# Patient Record
Sex: FEMALE | Race: WHITE | Employment: OTHER | URBAN - METROPOLITAN AREA
[De-identification: names, ages, dates, MRNs, and addresses within clinical notes are randomized per-mention and may not be internally consistent; named-entity substitution may affect disease eponyms.]

---

## 2018-06-17 ENCOUNTER — HOSPITAL ENCOUNTER (INPATIENT)
Age: 83
LOS: 8 days | Discharge: NURSING, REHAB OR LONG-TERM CARE | DRG: 242 | End: 2018-06-25
Attending: FAMILY MEDICINE | Admitting: INTERNAL MEDICINE
Payer: MEDICARE

## 2018-06-17 ENCOUNTER — APPOINTMENT (OUTPATIENT)
Dept: CT IMAGING | Age: 83
DRG: 242 | End: 2018-06-17
Payer: MEDICARE

## 2018-06-17 ENCOUNTER — APPOINTMENT (OUTPATIENT)
Dept: GENERAL RADIOLOGY | Age: 83
DRG: 242 | End: 2018-06-17
Payer: MEDICARE

## 2018-06-17 DIAGNOSIS — I63.50 CEREBROVASCULAR ACCIDENT (CVA) DUE TO STENOSIS OF CEREBRAL ARTERY (HCC): ICD-10-CM

## 2018-06-17 DIAGNOSIS — I21.4 NON-STEMI (NON-ST ELEVATED MYOCARDIAL INFARCTION) (HCC): ICD-10-CM

## 2018-06-17 DIAGNOSIS — I16.0 HYPERTENSIVE URGENCY: Primary | ICD-10-CM

## 2018-06-17 DIAGNOSIS — J18.9 PNEUMONIA OF BOTH LUNGS DUE TO INFECTIOUS ORGANISM, UNSPECIFIED PART OF LUNG: ICD-10-CM

## 2018-06-17 DIAGNOSIS — I45.9 HEART BLOCK: ICD-10-CM

## 2018-06-17 DIAGNOSIS — R00.1 BRADYCARDIA WITH 31-40 BEATS PER MINUTE: ICD-10-CM

## 2018-06-17 PROBLEM — R29.90 STROKE-LIKE SYMPTOM: Status: ACTIVE | Noted: 2018-06-17

## 2018-06-17 LAB
ALBUMIN SERPL-MCNC: 2.9 G/DL (ref 3.9–4.9)
ALP BLD-CCNC: 83 U/L (ref 40–130)
ALT SERPL-CCNC: 8 U/L (ref 0–33)
AMYLASE: 58 U/L (ref 28–100)
ANION GAP SERPL CALCULATED.3IONS-SCNC: 19 MEQ/L (ref 7–13)
AST SERPL-CCNC: 21 U/L (ref 0–35)
BACTERIA: NORMAL /HPF
BASOPHILS ABSOLUTE: 0.1 K/UL (ref 0–0.2)
BASOPHILS RELATIVE PERCENT: 0.7 %
BILIRUB SERPL-MCNC: 0.5 MG/DL (ref 0–1.2)
BILIRUBIN URINE: NEGATIVE
BLOOD, URINE: ABNORMAL
BUN BLDV-MCNC: 15 MG/DL (ref 8–23)
CALCIUM SERPL-MCNC: 8.6 MG/DL (ref 8.6–10.2)
CHLORIDE BLD-SCNC: 97 MEQ/L (ref 98–107)
CK MB: <0.1 NG/ML (ref 0–3.8)
CLARITY: CLEAR
CO2: 21 MEQ/L (ref 22–29)
COLOR: YELLOW
CREAT SERPL-MCNC: 0.51 MG/DL (ref 0.5–0.9)
CREATINE KINASE-MB INDEX: 0.3 % (ref 0–3.5)
EOSINOPHILS ABSOLUTE: 0 K/UL (ref 0–0.7)
EOSINOPHILS RELATIVE PERCENT: 0.6 %
GFR AFRICAN AMERICAN: >60
GFR NON-AFRICAN AMERICAN: >60
GLOBULIN: 4.8 G/DL (ref 2.3–3.5)
GLUCOSE BLD-MCNC: 185 MG/DL (ref 74–109)
GLUCOSE BLD-MCNC: 212 MG/DL
GLUCOSE BLD-MCNC: 212 MG/DL (ref 60–115)
GLUCOSE URINE: NEGATIVE MG/DL
HCT VFR BLD CALC: 32.1 % (ref 37–47)
HEMOGLOBIN: 10.3 G/DL (ref 12–16)
KETONES, URINE: ABNORMAL MG/DL
LACTIC ACID: 4.2 MMOL/L (ref 0.5–2.2)
LEUKOCYTE ESTERASE, URINE: NEGATIVE
LIPASE: 26 U/L (ref 13–60)
LYMPHOCYTES ABSOLUTE: 1.7 K/UL (ref 1–4.8)
LYMPHOCYTES RELATIVE PERCENT: 22.4 %
MCH RBC QN AUTO: 27.5 PG (ref 27–31.3)
MCHC RBC AUTO-ENTMCNC: 32.1 % (ref 33–37)
MCV RBC AUTO: 85.8 FL (ref 82–100)
MONOCYTES ABSOLUTE: 0.5 K/UL (ref 0.2–0.8)
MONOCYTES RELATIVE PERCENT: 6.4 %
NEUTROPHILS ABSOLUTE: 5.3 K/UL (ref 1.4–6.5)
NEUTROPHILS RELATIVE PERCENT: 69.9 %
NITRITE, URINE: NEGATIVE
PDW BLD-RTO: 16.4 % (ref 11.5–14.5)
PERFORMED ON: ABNORMAL
PERFORMED ON: ABNORMAL
PH UA: 7 (ref 5–9)
PLATELET # BLD: 394 K/UL (ref 130–400)
POC ACTIVATED CLOTTING TIME KAOLIN: 158 SEC (ref 82–152)
POC SAMPLE TYPE: ABNORMAL
POTASSIUM REFLEX MAGNESIUM: 3.7 MEQ/L (ref 3.5–5.1)
PRO-BNP: 4752 PG/ML
PROTEIN UA: >=300 MG/DL
RBC # BLD: 3.75 M/UL (ref 4.2–5.4)
RBC UA: NORMAL /HPF (ref 0–2)
SODIUM BLD-SCNC: 137 MEQ/L (ref 132–144)
SPECIFIC GRAVITY UA: 1.01 (ref 1–1.03)
TOTAL CK: 33 U/L (ref 0–170)
TOTAL PROTEIN: 7.7 G/DL (ref 6.4–8.1)
TROPONIN: 0.03 NG/ML (ref 0–0.01)
TROPONIN: 0.26 NG/ML (ref 0–0.01)
TROPONIN: 0.3 NG/ML (ref 0–0.01)
TSH SERPL DL<=0.05 MIU/L-ACNC: 10.45 UIU/ML (ref 0.27–4.2)
UROBILINOGEN, URINE: 0.2 E.U./DL
WBC # BLD: 7.5 K/UL (ref 4.8–10.8)
WBC UA: NORMAL /HPF (ref 0–5)

## 2018-06-17 PROCEDURE — 6360000002 HC RX W HCPCS: Performed by: FAMILY MEDICINE

## 2018-06-17 PROCEDURE — 6370000000 HC RX 637 (ALT 250 FOR IP): Performed by: FAMILY MEDICINE

## 2018-06-17 PROCEDURE — 84484 ASSAY OF TROPONIN QUANT: CPT

## 2018-06-17 PROCEDURE — 6370000000 HC RX 637 (ALT 250 FOR IP): Performed by: INTERNAL MEDICINE

## 2018-06-17 PROCEDURE — 83690 ASSAY OF LIPASE: CPT

## 2018-06-17 PROCEDURE — 82553 CREATINE MB FRACTION: CPT

## 2018-06-17 PROCEDURE — 83605 ASSAY OF LACTIC ACID: CPT

## 2018-06-17 PROCEDURE — C1874 STENT, COATED/COV W/DEL SYS: HCPCS

## 2018-06-17 PROCEDURE — 6360000002 HC RX W HCPCS: Performed by: INTERNAL MEDICINE

## 2018-06-17 PROCEDURE — C1725 CATH, TRANSLUMIN NON-LASER: HCPCS

## 2018-06-17 PROCEDURE — 71045 X-RAY EXAM CHEST 1 VIEW: CPT

## 2018-06-17 PROCEDURE — 71250 CT THORAX DX C-: CPT

## 2018-06-17 PROCEDURE — 87086 URINE CULTURE/COLONY COUNT: CPT

## 2018-06-17 PROCEDURE — 2580000003 HC RX 258: Performed by: INTERNAL MEDICINE

## 2018-06-17 PROCEDURE — 2000000000 HC ICU R&B

## 2018-06-17 PROCEDURE — C1894 INTRO/SHEATH, NON-LASER: HCPCS

## 2018-06-17 PROCEDURE — 5A1213Z PERFORMANCE OF CARDIAC PACING, INTERMITTENT: ICD-10-PCS | Performed by: INTERNAL MEDICINE

## 2018-06-17 PROCEDURE — 4A023N7 MEASUREMENT OF CARDIAC SAMPLING AND PRESSURE, LEFT HEART, PERCUTANEOUS APPROACH: ICD-10-PCS | Performed by: INTERNAL MEDICINE

## 2018-06-17 PROCEDURE — 6370000000 HC RX 637 (ALT 250 FOR IP)

## 2018-06-17 PROCEDURE — 81001 URINALYSIS AUTO W/SCOPE: CPT

## 2018-06-17 PROCEDURE — 99285 EMERGENCY DEPT VISIT HI MDM: CPT

## 2018-06-17 PROCEDURE — C1887 CATHETER, GUIDING: HCPCS

## 2018-06-17 PROCEDURE — C9600 PERC DRUG-EL COR STENT SING: HCPCS | Performed by: INTERNAL MEDICINE

## 2018-06-17 PROCEDURE — 70450 CT HEAD/BRAIN W/O DYE: CPT

## 2018-06-17 PROCEDURE — 85025 COMPLETE CBC W/AUTO DIFF WBC: CPT

## 2018-06-17 PROCEDURE — B2151ZZ FLUOROSCOPY OF LEFT HEART USING LOW OSMOLAR CONTRAST: ICD-10-PCS | Performed by: INTERNAL MEDICINE

## 2018-06-17 PROCEDURE — 2500000003 HC RX 250 WO HCPCS

## 2018-06-17 PROCEDURE — 85347 COAGULATION TIME ACTIVATED: CPT

## 2018-06-17 PROCEDURE — C1898 LEAD, PMKR, OTHER THAN TRANS: HCPCS

## 2018-06-17 PROCEDURE — 82150 ASSAY OF AMYLASE: CPT

## 2018-06-17 PROCEDURE — 93458 L HRT ARTERY/VENTRICLE ANGIO: CPT | Performed by: INTERNAL MEDICINE

## 2018-06-17 PROCEDURE — 6360000002 HC RX W HCPCS

## 2018-06-17 PROCEDURE — B2111ZZ FLUOROSCOPY OF MULTIPLE CORONARY ARTERIES USING LOW OSMOLAR CONTRAST: ICD-10-PCS | Performed by: INTERNAL MEDICINE

## 2018-06-17 PROCEDURE — 93005 ELECTROCARDIOGRAM TRACING: CPT

## 2018-06-17 PROCEDURE — 36415 COLL VENOUS BLD VENIPUNCTURE: CPT

## 2018-06-17 PROCEDURE — 027034Z DILATION OF CORONARY ARTERY, ONE ARTERY WITH DRUG-ELUTING INTRALUMINAL DEVICE, PERCUTANEOUS APPROACH: ICD-10-PCS | Performed by: INTERNAL MEDICINE

## 2018-06-17 PROCEDURE — 83880 ASSAY OF NATRIURETIC PEPTIDE: CPT

## 2018-06-17 PROCEDURE — 82550 ASSAY OF CK (CPK): CPT

## 2018-06-17 PROCEDURE — 84443 ASSAY THYROID STIM HORMONE: CPT

## 2018-06-17 PROCEDURE — 2580000003 HC RX 258: Performed by: FAMILY MEDICINE

## 2018-06-17 PROCEDURE — 80053 COMPREHEN METABOLIC PANEL: CPT

## 2018-06-17 PROCEDURE — C1769 GUIDE WIRE: HCPCS

## 2018-06-17 PROCEDURE — 2580000003 HC RX 258

## 2018-06-17 RX ORDER — METOPROLOL TARTRATE 5 MG/5ML
2.5 INJECTION INTRAVENOUS EVERY 6 HOURS
Status: DISCONTINUED | OUTPATIENT
Start: 2018-06-17 | End: 2018-06-21

## 2018-06-17 RX ORDER — ONDANSETRON 2 MG/ML
4 INJECTION INTRAMUSCULAR; INTRAVENOUS EVERY 6 HOURS PRN
Status: DISCONTINUED | OUTPATIENT
Start: 2018-06-17 | End: 2018-06-25 | Stop reason: HOSPADM

## 2018-06-17 RX ORDER — DOPAMINE HYDROCHLORIDE 160 MG/100ML
4 INJECTION, SOLUTION INTRAVENOUS CONTINUOUS
Status: DISCONTINUED | OUTPATIENT
Start: 2018-06-17 | End: 2018-06-19

## 2018-06-17 RX ORDER — SODIUM CHLORIDE 0.9 % (FLUSH) 0.9 %
10 SYRINGE (ML) INJECTION EVERY 12 HOURS SCHEDULED
Status: DISCONTINUED | OUTPATIENT
Start: 2018-06-17 | End: 2018-06-25 | Stop reason: HOSPADM

## 2018-06-17 RX ORDER — SODIUM CHLORIDE 9 MG/ML
INJECTION, SOLUTION INTRAVENOUS CONTINUOUS
Status: ACTIVE | OUTPATIENT
Start: 2018-06-17 | End: 2018-06-18

## 2018-06-17 RX ORDER — ENALAPRILAT 2.5 MG/2ML
1.25 INJECTION INTRAVENOUS EVERY 6 HOURS
Status: CANCELLED | OUTPATIENT
Start: 2018-06-17

## 2018-06-17 RX ORDER — 0.9 % SODIUM CHLORIDE 0.9 %
1000 INTRAVENOUS SOLUTION INTRAVENOUS ONCE
Status: COMPLETED | OUTPATIENT
Start: 2018-06-17 | End: 2018-06-17

## 2018-06-17 RX ORDER — SODIUM CHLORIDE 9 MG/ML
INJECTION, SOLUTION INTRAVENOUS CONTINUOUS
Status: DISCONTINUED | OUTPATIENT
Start: 2018-06-17 | End: 2018-06-18 | Stop reason: SDUPTHER

## 2018-06-17 RX ORDER — ASPIRIN 81 MG/1
81 TABLET ORAL DAILY
Status: DISCONTINUED | OUTPATIENT
Start: 2018-06-18 | End: 2018-06-21

## 2018-06-17 RX ORDER — SODIUM CHLORIDE 0.9 % (FLUSH) 0.9 %
10 SYRINGE (ML) INJECTION PRN
Status: DISCONTINUED | OUTPATIENT
Start: 2018-06-17 | End: 2018-06-18 | Stop reason: SDUPTHER

## 2018-06-17 RX ORDER — SIMVASTATIN 20 MG
20 TABLET ORAL NIGHTLY
Status: DISCONTINUED | OUTPATIENT
Start: 2018-06-17 | End: 2018-06-25 | Stop reason: HOSPADM

## 2018-06-17 RX ORDER — HYDRALAZINE HYDROCHLORIDE 20 MG/ML
10 INJECTION INTRAMUSCULAR; INTRAVENOUS EVERY 4 HOURS PRN
Status: DISCONTINUED | OUTPATIENT
Start: 2018-06-17 | End: 2018-06-17 | Stop reason: SDUPTHER

## 2018-06-17 RX ORDER — ONDANSETRON 2 MG/ML
4 INJECTION INTRAMUSCULAR; INTRAVENOUS EVERY 6 HOURS PRN
Status: DISCONTINUED | OUTPATIENT
Start: 2018-06-17 | End: 2018-06-18 | Stop reason: SDUPTHER

## 2018-06-17 RX ORDER — ACETAMINOPHEN 325 MG/1
650 TABLET ORAL EVERY 4 HOURS PRN
Status: DISCONTINUED | OUTPATIENT
Start: 2018-06-17 | End: 2018-06-25 | Stop reason: HOSPADM

## 2018-06-17 RX ORDER — ATROPINE SULFATE 0.4 MG/ML
0.6 AMPUL (ML) INJECTION
Status: ACTIVE | OUTPATIENT
Start: 2018-06-17 | End: 2018-06-17

## 2018-06-17 RX ORDER — CLOPIDOGREL BISULFATE 75 MG/1
75 TABLET ORAL DAILY
Status: DISCONTINUED | OUTPATIENT
Start: 2018-06-18 | End: 2018-06-21

## 2018-06-17 RX ORDER — 0.9 % SODIUM CHLORIDE 0.9 %
500 INTRAVENOUS SOLUTION INTRAVENOUS ONCE
Status: DISCONTINUED | OUTPATIENT
Start: 2018-06-17 | End: 2018-06-25 | Stop reason: HOSPADM

## 2018-06-17 RX ORDER — HYDRALAZINE HYDROCHLORIDE 20 MG/ML
10 INJECTION INTRAMUSCULAR; INTRAVENOUS EVERY 6 HOURS
Status: DISCONTINUED | OUTPATIENT
Start: 2018-06-17 | End: 2018-06-18

## 2018-06-17 RX ORDER — HYDRALAZINE HYDROCHLORIDE 20 MG/ML
10 INJECTION INTRAMUSCULAR; INTRAVENOUS EVERY 4 HOURS PRN
Status: DISCONTINUED | OUTPATIENT
Start: 2018-06-17 | End: 2018-06-17

## 2018-06-17 RX ORDER — HYDRALAZINE HYDROCHLORIDE 20 MG/ML
10 INJECTION INTRAMUSCULAR; INTRAVENOUS EVERY 10 MIN PRN
Status: COMPLETED | OUTPATIENT
Start: 2018-06-17 | End: 2018-06-18

## 2018-06-17 RX ORDER — LOSARTAN POTASSIUM 50 MG/1
50 TABLET ORAL DAILY
Status: DISCONTINUED | OUTPATIENT
Start: 2018-06-17 | End: 2018-06-19

## 2018-06-17 RX ORDER — HYDRALAZINE HYDROCHLORIDE 20 MG/ML
10 INJECTION INTRAMUSCULAR; INTRAVENOUS ONCE
Status: COMPLETED | OUTPATIENT
Start: 2018-06-17 | End: 2018-06-17

## 2018-06-17 RX ORDER — DOCUSATE SODIUM 100 MG/1
100 CAPSULE, LIQUID FILLED ORAL 2 TIMES DAILY
Status: DISCONTINUED | OUTPATIENT
Start: 2018-06-17 | End: 2018-06-25 | Stop reason: HOSPADM

## 2018-06-17 RX ORDER — SODIUM CHLORIDE 0.9 % (FLUSH) 0.9 %
10 SYRINGE (ML) INJECTION EVERY 12 HOURS SCHEDULED
Status: DISCONTINUED | OUTPATIENT
Start: 2018-06-17 | End: 2018-06-18 | Stop reason: SDUPTHER

## 2018-06-17 RX ORDER — POTASSIUM CHLORIDE 20MEQ/15ML
20 LIQUID (ML) ORAL ONCE
Status: COMPLETED | OUTPATIENT
Start: 2018-06-17 | End: 2018-06-17

## 2018-06-17 RX ORDER — MAGNESIUM SULFATE IN WATER 40 MG/ML
4 INJECTION, SOLUTION INTRAVENOUS ONCE
Status: COMPLETED | OUTPATIENT
Start: 2018-06-17 | End: 2018-06-18

## 2018-06-17 RX ORDER — METOPROLOL TARTRATE 5 MG/5ML
INJECTION INTRAVENOUS
Status: COMPLETED
Start: 2018-06-17 | End: 2018-06-17

## 2018-06-17 RX ADMIN — METOROPROLOL TARTRATE 2.5 MG: 5 INJECTION, SOLUTION INTRAVENOUS at 21:54

## 2018-06-17 RX ADMIN — AZITHROMYCIN MONOHYDRATE 500 MG: 500 INJECTION, POWDER, LYOPHILIZED, FOR SOLUTION INTRAVENOUS at 18:22

## 2018-06-17 RX ADMIN — SODIUM CHLORIDE: 9 INJECTION, SOLUTION INTRAVENOUS at 20:45

## 2018-06-17 RX ADMIN — DOCUSATE SODIUM 100 MG: 100 CAPSULE, LIQUID FILLED ORAL at 21:30

## 2018-06-17 RX ADMIN — HYDRALAZINE HYDROCHLORIDE 10 MG: 20 INJECTION INTRAMUSCULAR; INTRAVENOUS at 15:45

## 2018-06-17 RX ADMIN — DOPAMINE HYDROCHLORIDE 4 MCG/KG/MIN: 160 INJECTION, SOLUTION INTRAVENOUS at 14:03

## 2018-06-17 RX ADMIN — Medication 10 ML: at 21:00

## 2018-06-17 RX ADMIN — HYDRALAZINE HYDROCHLORIDE 10 MG: 20 INJECTION INTRAMUSCULAR; INTRAVENOUS at 18:21

## 2018-06-17 RX ADMIN — SODIUM CHLORIDE 1000 ML: 9 INJECTION, SOLUTION INTRAVENOUS at 12:45

## 2018-06-17 RX ADMIN — CEFTRIAXONE SODIUM 1 G: 1 INJECTION, POWDER, FOR SOLUTION INTRAMUSCULAR; INTRAVENOUS at 18:22

## 2018-06-17 RX ADMIN — SIMVASTATIN 20 MG: 20 TABLET, FILM COATED ORAL at 21:30

## 2018-06-17 RX ADMIN — Medication 10 ML: at 21:31

## 2018-06-17 RX ADMIN — ASPIRIN 325 MG: 325 TABLET, DELAYED RELEASE ORAL at 13:31

## 2018-06-17 RX ADMIN — HYDRALAZINE HYDROCHLORIDE 10 MG: 20 INJECTION INTRAMUSCULAR; INTRAVENOUS at 13:32

## 2018-06-17 RX ADMIN — SODIUM CHLORIDE: 9 INJECTION, SOLUTION INTRAVENOUS at 18:22

## 2018-06-17 RX ADMIN — NITROGLYCERIN 1 INCH: 20 OINTMENT TOPICAL at 18:22

## 2018-06-17 RX ADMIN — MAGNESIUM SULFATE IN WATER 4 G: 40 INJECTION, SOLUTION INTRAVENOUS at 21:42

## 2018-06-17 RX ADMIN — SODIUM CHLORIDE 1000 ML: 9 INJECTION, SOLUTION INTRAVENOUS at 11:29

## 2018-06-17 RX ADMIN — LOSARTAN POTASSIUM 50 MG: 50 TABLET ORAL at 23:20

## 2018-06-17 RX ADMIN — HYDRALAZINE HYDROCHLORIDE 10 MG: 20 INJECTION INTRAMUSCULAR; INTRAVENOUS at 21:35

## 2018-06-17 RX ADMIN — POTASSIUM CHLORIDE 20 MEQ: 20 SOLUTION ORAL at 21:49

## 2018-06-17 ASSESSMENT — PAIN SCALES - GENERAL
PAINLEVEL_OUTOF10: 0

## 2018-06-17 ASSESSMENT — ENCOUNTER SYMPTOMS: RESPIRATORY NEGATIVE: 1

## 2018-06-18 LAB
ALBUMIN SERPL-MCNC: 2.4 G/DL (ref 3.9–4.9)
ALP BLD-CCNC: 67 U/L (ref 40–130)
ALT SERPL-CCNC: 7 U/L (ref 0–33)
ANION GAP SERPL CALCULATED.3IONS-SCNC: 17 MEQ/L (ref 7–13)
AST SERPL-CCNC: 21 U/L (ref 0–35)
BACTERIA: NORMAL /HPF
BASOPHILS ABSOLUTE: 0 K/UL (ref 0–0.2)
BASOPHILS RELATIVE PERCENT: 0.3 %
BILIRUB SERPL-MCNC: 0.4 MG/DL (ref 0–1.2)
BILIRUBIN URINE: NEGATIVE
BLOOD, URINE: NEGATIVE
BUN BLDV-MCNC: 18 MG/DL (ref 8–23)
CALCIUM SERPL-MCNC: 8.2 MG/DL (ref 8.6–10.2)
CHLORIDE BLD-SCNC: 104 MEQ/L (ref 98–107)
CLARITY: CLEAR
CO2: 19 MEQ/L (ref 22–29)
COLOR: YELLOW
CREAT SERPL-MCNC: 0.63 MG/DL (ref 0.5–0.9)
EOSINOPHILS ABSOLUTE: 0 K/UL (ref 0–0.7)
EOSINOPHILS RELATIVE PERCENT: 0 %
EPITHELIAL CELLS, UA: NORMAL /HPF
GFR AFRICAN AMERICAN: >60
GFR NON-AFRICAN AMERICAN: >60
GLOBULIN: 4.5 G/DL (ref 2.3–3.5)
GLUCOSE BLD-MCNC: 108 MG/DL (ref 74–109)
GLUCOSE URINE: NEGATIVE MG/DL
HCT VFR BLD CALC: 31.7 % (ref 37–47)
HEMOGLOBIN: 10.1 G/DL (ref 12–16)
KETONES, URINE: NEGATIVE MG/DL
LEUKOCYTE ESTERASE, URINE: ABNORMAL
LV EF: 60 %
LVEF MODALITY: NORMAL
LYMPHOCYTES ABSOLUTE: 0.6 K/UL (ref 1–4.8)
LYMPHOCYTES RELATIVE PERCENT: 4.7 %
MAGNESIUM: 3.7 MG/DL (ref 1.7–2.3)
MCH RBC QN AUTO: 27.5 PG (ref 27–31.3)
MCHC RBC AUTO-ENTMCNC: 31.8 % (ref 33–37)
MCV RBC AUTO: 86.7 FL (ref 82–100)
MONOCYTES ABSOLUTE: 0.6 K/UL (ref 0.2–0.8)
MONOCYTES RELATIVE PERCENT: 4.3 %
NEUTROPHILS ABSOLUTE: 11.6 K/UL (ref 1.4–6.5)
NEUTROPHILS RELATIVE PERCENT: 90.7 %
NITRITE, URINE: NEGATIVE
PDW BLD-RTO: 17.1 % (ref 11.5–14.5)
PH UA: 5 (ref 5–9)
PLATELET # BLD: 268 K/UL (ref 130–400)
POTASSIUM REFLEX MAGNESIUM: 3.8 MEQ/L (ref 3.5–5.1)
POTASSIUM SERPL-SCNC: 3.8 MEQ/L (ref 3.5–5.1)
PROTEIN UA: 30 MG/DL
RBC # BLD: 3.66 M/UL (ref 4.2–5.4)
RBC UA: NORMAL /HPF (ref 0–2)
SODIUM BLD-SCNC: 140 MEQ/L (ref 132–144)
SPECIFIC GRAVITY UA: 1.04 (ref 1–1.03)
TOTAL PROTEIN: 6.9 G/DL (ref 6.4–8.1)
TROPONIN: 0.23 NG/ML (ref 0–0.01)
TROPONIN: 0.3 NG/ML (ref 0–0.01)
URINE CULTURE, ROUTINE: NORMAL
UROBILINOGEN, URINE: 0.2 E.U./DL
WBC # BLD: 12.8 K/UL (ref 4.8–10.8)
WBC UA: NORMAL /HPF (ref 0–5)

## 2018-06-18 PROCEDURE — 80053 COMPREHEN METABOLIC PANEL: CPT

## 2018-06-18 PROCEDURE — 87040 BLOOD CULTURE FOR BACTERIA: CPT

## 2018-06-18 PROCEDURE — 2580000003 HC RX 258: Performed by: INTERNAL MEDICINE

## 2018-06-18 PROCEDURE — 6360000002 HC RX W HCPCS: Performed by: INTERNAL MEDICINE

## 2018-06-18 PROCEDURE — 85025 COMPLETE CBC W/AUTO DIFF WBC: CPT

## 2018-06-18 PROCEDURE — 6370000000 HC RX 637 (ALT 250 FOR IP): Performed by: INTERNAL MEDICINE

## 2018-06-18 PROCEDURE — 83735 ASSAY OF MAGNESIUM: CPT

## 2018-06-18 PROCEDURE — 2000000000 HC ICU R&B

## 2018-06-18 PROCEDURE — 99223 1ST HOSP IP/OBS HIGH 75: CPT | Performed by: INTERNAL MEDICINE

## 2018-06-18 PROCEDURE — 84484 ASSAY OF TROPONIN QUANT: CPT

## 2018-06-18 PROCEDURE — 92610 EVALUATE SWALLOWING FUNCTION: CPT

## 2018-06-18 PROCEDURE — 93005 ELECTROCARDIOGRAM TRACING: CPT

## 2018-06-18 PROCEDURE — 2700000000 HC OXYGEN THERAPY PER DAY

## 2018-06-18 PROCEDURE — G8996 SWALLOW CURRENT STATUS: HCPCS

## 2018-06-18 PROCEDURE — 81001 URINALYSIS AUTO W/SCOPE: CPT

## 2018-06-18 PROCEDURE — 2500000003 HC RX 250 WO HCPCS: Performed by: INTERNAL MEDICINE

## 2018-06-18 PROCEDURE — 87086 URINE CULTURE/COLONY COUNT: CPT

## 2018-06-18 PROCEDURE — 36415 COLL VENOUS BLD VENIPUNCTURE: CPT

## 2018-06-18 PROCEDURE — G8997 SWALLOW GOAL STATUS: HCPCS

## 2018-06-18 PROCEDURE — 93306 TTE W/DOPPLER COMPLETE: CPT

## 2018-06-18 RX ORDER — SODIUM CHLORIDE 9 MG/ML
INJECTION, SOLUTION INTRAVENOUS CONTINUOUS
Status: DISCONTINUED | OUTPATIENT
Start: 2018-06-18 | End: 2018-06-20

## 2018-06-18 RX ORDER — HYDRALAZINE HYDROCHLORIDE 20 MG/ML
10 INJECTION INTRAMUSCULAR; INTRAVENOUS EVERY 4 HOURS PRN
Status: DISCONTINUED | OUTPATIENT
Start: 2018-06-18 | End: 2018-06-25 | Stop reason: HOSPADM

## 2018-06-18 RX ORDER — CHLORHEXIDINE GLUCONATE 4 G/100ML
SOLUTION TOPICAL ONCE
Status: COMPLETED | OUTPATIENT
Start: 2018-06-19 | End: 2018-06-19

## 2018-06-18 RX ORDER — VANCOMYCIN HYDROCHLORIDE 500 MG/100ML
500 INJECTION, SOLUTION INTRAVENOUS ONCE
Status: DISCONTINUED | OUTPATIENT
Start: 2018-06-18 | End: 2018-06-18

## 2018-06-18 RX ORDER — CHLORHEXIDINE GLUCONATE 4 G/100ML
SOLUTION TOPICAL ONCE
Status: DISCONTINUED | OUTPATIENT
Start: 2018-06-18 | End: 2018-06-18

## 2018-06-18 RX ORDER — VANCOMYCIN HYDROCHLORIDE 1 G/200ML
1000 INJECTION, SOLUTION INTRAVENOUS ONCE
Status: DISCONTINUED | OUTPATIENT
Start: 2018-06-18 | End: 2018-06-18

## 2018-06-18 RX ORDER — VANCOMYCIN HYDROCHLORIDE 500 MG/100ML
500 INJECTION, SOLUTION INTRAVENOUS ONCE
Status: DISCONTINUED | OUTPATIENT
Start: 2018-06-19 | End: 2018-06-21

## 2018-06-18 RX ORDER — HYDRALAZINE HYDROCHLORIDE 50 MG/1
50 TABLET, FILM COATED ORAL EVERY 8 HOURS SCHEDULED
Status: DISCONTINUED | OUTPATIENT
Start: 2018-06-18 | End: 2018-06-19

## 2018-06-18 RX ORDER — SODIUM CHLORIDE 0.9 % (FLUSH) 0.9 %
10 SYRINGE (ML) INJECTION EVERY 12 HOURS SCHEDULED
Status: DISCONTINUED | OUTPATIENT
Start: 2018-06-18 | End: 2018-06-18 | Stop reason: SDUPTHER

## 2018-06-18 RX ORDER — SODIUM CHLORIDE 0.9 % (FLUSH) 0.9 %
10 SYRINGE (ML) INJECTION PRN
Status: DISCONTINUED | OUTPATIENT
Start: 2018-06-18 | End: 2018-06-25 | Stop reason: HOSPADM

## 2018-06-18 RX ADMIN — VANCOMYCIN HYDROCHLORIDE 750 MG: 750 INJECTION, POWDER, LYOPHILIZED, FOR SOLUTION INTRAVENOUS at 13:37

## 2018-06-18 RX ADMIN — HYDRALAZINE HYDROCHLORIDE 50 MG: 50 TABLET, FILM COATED ORAL at 17:29

## 2018-06-18 RX ADMIN — HYDRALAZINE HYDROCHLORIDE 10 MG: 20 INJECTION INTRAMUSCULAR; INTRAVENOUS at 04:00

## 2018-06-18 RX ADMIN — AZITHROMYCIN MONOHYDRATE 500 MG: 500 INJECTION, POWDER, LYOPHILIZED, FOR SOLUTION INTRAVENOUS at 17:29

## 2018-06-18 RX ADMIN — NITROGLYCERIN 1 INCH: 20 OINTMENT TOPICAL at 17:29

## 2018-06-18 RX ADMIN — LOSARTAN POTASSIUM 50 MG: 50 TABLET ORAL at 08:58

## 2018-06-18 RX ADMIN — HYDRALAZINE HYDROCHLORIDE 10 MG: 20 INJECTION INTRAMUSCULAR; INTRAVENOUS at 08:59

## 2018-06-18 RX ADMIN — HYDRALAZINE HYDROCHLORIDE 50 MG: 50 TABLET, FILM COATED ORAL at 22:45

## 2018-06-18 RX ADMIN — METOROPROLOL TARTRATE 2.5 MG: 5 INJECTION, SOLUTION INTRAVENOUS at 22:45

## 2018-06-18 RX ADMIN — METOROPROLOL TARTRATE 2.5 MG: 5 INJECTION, SOLUTION INTRAVENOUS at 05:00

## 2018-06-18 RX ADMIN — NITROGLYCERIN 1 INCH: 20 OINTMENT TOPICAL at 02:30

## 2018-06-18 RX ADMIN — NITROGLYCERIN 1 INCH: 20 OINTMENT TOPICAL at 10:00

## 2018-06-18 RX ADMIN — CLOPIDOGREL BISULFATE 75 MG: 75 TABLET ORAL at 12:45

## 2018-06-18 RX ADMIN — Medication 10 ML: at 08:59

## 2018-06-18 RX ADMIN — NITROGLYCERIN 1 INCH: 20 OINTMENT TOPICAL at 22:45

## 2018-06-18 RX ADMIN — PIPERACILLIN SODIUM AND TAZOBACTAM SODIUM 3.38 G: 3; .375 INJECTION, POWDER, LYOPHILIZED, FOR SOLUTION INTRAVENOUS at 12:46

## 2018-06-18 RX ADMIN — Medication 10 ML: at 20:33

## 2018-06-18 RX ADMIN — SODIUM CHLORIDE: 9 INJECTION, SOLUTION INTRAVENOUS at 20:31

## 2018-06-18 RX ADMIN — PIPERACILLIN SODIUM AND TAZOBACTAM SODIUM 3.38 G: 3; .375 INJECTION, POWDER, LYOPHILIZED, FOR SOLUTION INTRAVENOUS at 20:32

## 2018-06-18 RX ADMIN — SIMVASTATIN 20 MG: 20 TABLET, FILM COATED ORAL at 20:31

## 2018-06-18 RX ADMIN — SODIUM CHLORIDE: 9 INJECTION, SOLUTION INTRAVENOUS at 09:05

## 2018-06-18 RX ADMIN — HYDRALAZINE HYDROCHLORIDE 10 MG: 20 INJECTION INTRAMUSCULAR; INTRAVENOUS at 02:33

## 2018-06-18 RX ADMIN — DOCUSATE SODIUM 100 MG: 100 CAPSULE, LIQUID FILLED ORAL at 20:31

## 2018-06-18 RX ADMIN — Medication 10 ML: at 09:00

## 2018-06-18 RX ADMIN — HYDRALAZINE HYDROCHLORIDE 10 MG: 20 INJECTION INTRAMUSCULAR; INTRAVENOUS at 12:07

## 2018-06-18 RX ADMIN — ASPIRIN 81 MG: 81 TABLET, COATED ORAL at 12:46

## 2018-06-18 RX ADMIN — HYDRALAZINE HYDROCHLORIDE 10 MG: 20 INJECTION INTRAMUSCULAR; INTRAVENOUS at 14:56

## 2018-06-18 ASSESSMENT — PAIN SCALES - GENERAL
PAINLEVEL_OUTOF10: 0

## 2018-06-19 ENCOUNTER — APPOINTMENT (OUTPATIENT)
Dept: GENERAL RADIOLOGY | Age: 83
DRG: 242 | End: 2018-06-19
Payer: MEDICARE

## 2018-06-19 LAB
ALBUMIN SERPL-MCNC: 2.4 G/DL (ref 3.9–4.9)
ALP BLD-CCNC: 62 U/L (ref 40–130)
ALT SERPL-CCNC: 6 U/L (ref 0–33)
ANION GAP SERPL CALCULATED.3IONS-SCNC: 16 MEQ/L (ref 7–13)
AST SERPL-CCNC: 19 U/L (ref 0–35)
BILIRUB SERPL-MCNC: 0.4 MG/DL (ref 0–1.2)
BUN BLDV-MCNC: 21 MG/DL (ref 8–23)
CALCIUM SERPL-MCNC: 7.9 MG/DL (ref 8.6–10.2)
CHLORIDE BLD-SCNC: 103 MEQ/L (ref 98–107)
CO2: 17 MEQ/L (ref 22–29)
CREAT SERPL-MCNC: 0.76 MG/DL (ref 0.5–0.9)
GFR AFRICAN AMERICAN: >60
GFR NON-AFRICAN AMERICAN: >60
GLOBULIN: 4.1 G/DL (ref 2.3–3.5)
GLUCOSE BLD-MCNC: 98 MG/DL (ref 74–109)
HCT VFR BLD CALC: 27.8 % (ref 37–47)
HEMOGLOBIN: 8.8 G/DL (ref 12–16)
INR BLD: 1.1
MCH RBC QN AUTO: 27.2 PG (ref 27–31.3)
MCHC RBC AUTO-ENTMCNC: 31.6 % (ref 33–37)
MCV RBC AUTO: 85.9 FL (ref 82–100)
PDW BLD-RTO: 17.3 % (ref 11.5–14.5)
PLATELET # BLD: 330 K/UL (ref 130–400)
POTASSIUM SERPL-SCNC: 3.3 MEQ/L (ref 3.5–5.1)
PROTHROMBIN TIME: 11.4 SEC (ref 9.6–12.3)
RBC # BLD: 3.23 M/UL (ref 4.2–5.4)
SODIUM BLD-SCNC: 136 MEQ/L (ref 132–144)
TOTAL PROTEIN: 6.5 G/DL (ref 6.4–8.1)
WBC # BLD: 8.9 K/UL (ref 4.8–10.8)

## 2018-06-19 PROCEDURE — 6370000000 HC RX 637 (ALT 250 FOR IP)

## 2018-06-19 PROCEDURE — 6360000002 HC RX W HCPCS

## 2018-06-19 PROCEDURE — 85610 PROTHROMBIN TIME: CPT

## 2018-06-19 PROCEDURE — 6360000002 HC RX W HCPCS: Performed by: INTERNAL MEDICINE

## 2018-06-19 PROCEDURE — 02HK3JZ INSERTION OF PACEMAKER LEAD INTO RIGHT VENTRICLE, PERCUTANEOUS APPROACH: ICD-10-PCS | Performed by: INTERNAL MEDICINE

## 2018-06-19 PROCEDURE — 85027 COMPLETE CBC AUTOMATED: CPT

## 2018-06-19 PROCEDURE — 6370000000 HC RX 637 (ALT 250 FOR IP): Performed by: INTERNAL MEDICINE

## 2018-06-19 PROCEDURE — 71045 X-RAY EXAM CHEST 1 VIEW: CPT

## 2018-06-19 PROCEDURE — 2500000003 HC RX 250 WO HCPCS: Performed by: INTERNAL MEDICINE

## 2018-06-19 PROCEDURE — 0JH606Z INSERTION OF PACEMAKER, DUAL CHAMBER INTO CHEST SUBCUTANEOUS TISSUE AND FASCIA, OPEN APPROACH: ICD-10-PCS | Performed by: INTERNAL MEDICINE

## 2018-06-19 PROCEDURE — 36415 COLL VENOUS BLD VENIPUNCTURE: CPT

## 2018-06-19 PROCEDURE — 80053 COMPREHEN METABOLIC PANEL: CPT

## 2018-06-19 PROCEDURE — B51N1ZZ FLUOROSCOPY OF LEFT UPPER EXTREMITY VEINS USING LOW OSMOLAR CONTRAST: ICD-10-PCS | Performed by: INTERNAL MEDICINE

## 2018-06-19 PROCEDURE — 2500000003 HC RX 250 WO HCPCS

## 2018-06-19 PROCEDURE — 99212 OFFICE O/P EST SF 10 MIN: CPT

## 2018-06-19 PROCEDURE — 02PA3YZ REMOVAL OF OTHER DEVICE FROM HEART, PERCUTANEOUS APPROACH: ICD-10-PCS | Performed by: INTERNAL MEDICINE

## 2018-06-19 PROCEDURE — 99233 SBSQ HOSP IP/OBS HIGH 50: CPT | Performed by: INTERNAL MEDICINE

## 2018-06-19 PROCEDURE — 33208 INSRT HEART PM ATRIAL & VENT: CPT | Performed by: INTERNAL MEDICINE

## 2018-06-19 PROCEDURE — 2580000003 HC RX 258

## 2018-06-19 PROCEDURE — 2580000003 HC RX 258: Performed by: INTERNAL MEDICINE

## 2018-06-19 PROCEDURE — C1898 LEAD, PMKR, OTHER THAN TRANS: HCPCS

## 2018-06-19 PROCEDURE — 02H63JZ INSERTION OF PACEMAKER LEAD INTO RIGHT ATRIUM, PERCUTANEOUS APPROACH: ICD-10-PCS | Performed by: INTERNAL MEDICINE

## 2018-06-19 PROCEDURE — 95816 EEG AWAKE AND DROWSY: CPT

## 2018-06-19 PROCEDURE — 2000000000 HC ICU R&B

## 2018-06-19 PROCEDURE — 93005 ELECTROCARDIOGRAM TRACING: CPT

## 2018-06-19 RX ORDER — ENALAPRILAT 2.5 MG/2ML
1.25 INJECTION INTRAVENOUS EVERY 6 HOURS SCHEDULED
Status: DISCONTINUED | OUTPATIENT
Start: 2018-06-20 | End: 2018-06-21

## 2018-06-19 RX ORDER — LOSARTAN POTASSIUM 50 MG/1
100 TABLET ORAL DAILY
Status: DISCONTINUED | OUTPATIENT
Start: 2018-06-20 | End: 2018-06-21

## 2018-06-19 RX ORDER — ONDANSETRON 2 MG/ML
4 INJECTION INTRAMUSCULAR; INTRAVENOUS EVERY 6 HOURS PRN
Status: DISCONTINUED | OUTPATIENT
Start: 2018-06-19 | End: 2018-06-19 | Stop reason: SDUPTHER

## 2018-06-19 RX ORDER — ACETAMINOPHEN 325 MG/1
650 TABLET ORAL EVERY 4 HOURS PRN
Status: DISCONTINUED | OUTPATIENT
Start: 2018-06-19 | End: 2018-06-19 | Stop reason: SDUPTHER

## 2018-06-19 RX ORDER — SODIUM CHLORIDE 0.9 % (FLUSH) 0.9 %
10 SYRINGE (ML) INJECTION EVERY 12 HOURS SCHEDULED
Status: DISCONTINUED | OUTPATIENT
Start: 2018-06-19 | End: 2018-06-21 | Stop reason: SDUPTHER

## 2018-06-19 RX ORDER — OXYCODONE HYDROCHLORIDE AND ACETAMINOPHEN 5; 325 MG/1; MG/1
1 TABLET ORAL EVERY 4 HOURS PRN
Status: DISCONTINUED | OUTPATIENT
Start: 2018-06-19 | End: 2018-06-25 | Stop reason: HOSPADM

## 2018-06-19 RX ORDER — HYDRALAZINE HYDROCHLORIDE 100 MG/1
100 TABLET, FILM COATED ORAL EVERY 8 HOURS SCHEDULED
Status: DISCONTINUED | OUTPATIENT
Start: 2018-06-19 | End: 2018-06-21

## 2018-06-19 RX ORDER — POTASSIUM CHLORIDE 20 MEQ/1
40 TABLET, EXTENDED RELEASE ORAL DAILY
Status: COMPLETED | OUTPATIENT
Start: 2018-06-19 | End: 2018-06-20

## 2018-06-19 RX ORDER — HYDRALAZINE HYDROCHLORIDE 20 MG/ML
10 INJECTION INTRAMUSCULAR; INTRAVENOUS ONCE
Status: COMPLETED | OUTPATIENT
Start: 2018-06-19 | End: 2018-06-19

## 2018-06-19 RX ORDER — SODIUM CHLORIDE 0.9 % (FLUSH) 0.9 %
10 SYRINGE (ML) INJECTION PRN
Status: DISCONTINUED | OUTPATIENT
Start: 2018-06-19 | End: 2018-06-25 | Stop reason: HOSPADM

## 2018-06-19 RX ADMIN — CHLORHEXIDINE GLUCONATE: 213 SOLUTION TOPICAL at 04:27

## 2018-06-19 RX ADMIN — Medication 10 ML: at 08:17

## 2018-06-19 RX ADMIN — HYDRALAZINE HYDROCHLORIDE 10 MG: 20 INJECTION INTRAMUSCULAR; INTRAVENOUS at 22:45

## 2018-06-19 RX ADMIN — HYDRALAZINE HYDROCHLORIDE 100 MG: 100 TABLET, FILM COATED ORAL at 14:59

## 2018-06-19 RX ADMIN — DOCUSATE SODIUM 100 MG: 100 CAPSULE, LIQUID FILLED ORAL at 22:04

## 2018-06-19 RX ADMIN — SIMVASTATIN 20 MG: 20 TABLET, FILM COATED ORAL at 22:04

## 2018-06-19 RX ADMIN — NITROGLYCERIN 1 INCH: 20 OINTMENT TOPICAL at 06:36

## 2018-06-19 RX ADMIN — Medication 10 ML: at 20:10

## 2018-06-19 RX ADMIN — CLOPIDOGREL BISULFATE 75 MG: 75 TABLET ORAL at 23:06

## 2018-06-19 RX ADMIN — HYDRALAZINE HYDROCHLORIDE 10 MG: 20 INJECTION INTRAMUSCULAR; INTRAVENOUS at 09:20

## 2018-06-19 RX ADMIN — HYDRALAZINE HYDROCHLORIDE 10 MG: 20 INJECTION INTRAMUSCULAR; INTRAVENOUS at 05:09

## 2018-06-19 RX ADMIN — NITROGLYCERIN 1 INCH: 20 OINTMENT TOPICAL at 22:05

## 2018-06-19 RX ADMIN — POTASSIUM CHLORIDE 40 MEQ: 20 TABLET, EXTENDED RELEASE ORAL at 14:59

## 2018-06-19 RX ADMIN — ASPIRIN 81 MG: 81 TABLET, COATED ORAL at 23:06

## 2018-06-19 RX ADMIN — PIPERACILLIN SODIUM AND TAZOBACTAM SODIUM 3.38 G: 3; .375 INJECTION, POWDER, LYOPHILIZED, FOR SOLUTION INTRAVENOUS at 04:04

## 2018-06-19 RX ADMIN — AZITHROMYCIN MONOHYDRATE 500 MG: 500 INJECTION, POWDER, LYOPHILIZED, FOR SOLUTION INTRAVENOUS at 14:59

## 2018-06-19 RX ADMIN — SODIUM CHLORIDE 1 G: 900 IRRIGANT IRRIGATION at 17:15

## 2018-06-19 RX ADMIN — HYDRALAZINE HYDROCHLORIDE 10 MG: 20 INJECTION INTRAMUSCULAR; INTRAVENOUS at 19:01

## 2018-06-19 RX ADMIN — PIPERACILLIN SODIUM AND TAZOBACTAM SODIUM 3.38 G: 3; .375 INJECTION, POWDER, LYOPHILIZED, FOR SOLUTION INTRAVENOUS at 12:13

## 2018-06-19 RX ADMIN — METOROPROLOL TARTRATE 2.5 MG: 5 INJECTION, SOLUTION INTRAVENOUS at 04:06

## 2018-06-19 RX ADMIN — METOROPROLOL TARTRATE 2.5 MG: 5 INJECTION, SOLUTION INTRAVENOUS at 22:05

## 2018-06-19 RX ADMIN — LOSARTAN POTASSIUM 50 MG: 50 TABLET ORAL at 08:17

## 2018-06-19 RX ADMIN — HYDRALAZINE HYDROCHLORIDE 10 MG: 20 INJECTION INTRAMUSCULAR; INTRAVENOUS at 12:13

## 2018-06-19 RX ADMIN — PIPERACILLIN SODIUM AND TAZOBACTAM SODIUM 3.38 G: 3; .375 INJECTION, POWDER, LYOPHILIZED, FOR SOLUTION INTRAVENOUS at 22:00

## 2018-06-19 RX ADMIN — SODIUM CHLORIDE: 9 INJECTION, SOLUTION INTRAVENOUS at 20:09

## 2018-06-19 RX ADMIN — HYDRALAZINE HYDROCHLORIDE 50 MG: 50 TABLET, FILM COATED ORAL at 06:36

## 2018-06-19 RX ADMIN — NITROGLYCERIN 1 INCH: 20 OINTMENT TOPICAL at 15:00

## 2018-06-19 RX ADMIN — HYDRALAZINE HYDROCHLORIDE 100 MG: 100 TABLET, FILM COATED ORAL at 22:04

## 2018-06-19 ASSESSMENT — PAIN SCALES - GENERAL
PAINLEVEL_OUTOF10: 0

## 2018-06-20 ENCOUNTER — APPOINTMENT (OUTPATIENT)
Dept: CT IMAGING | Age: 83
DRG: 242 | End: 2018-06-20
Payer: MEDICARE

## 2018-06-20 ENCOUNTER — APPOINTMENT (OUTPATIENT)
Dept: GENERAL RADIOLOGY | Age: 83
DRG: 242 | End: 2018-06-20
Payer: MEDICARE

## 2018-06-20 LAB
ALBUMIN SERPL-MCNC: 2.5 G/DL (ref 3.9–4.9)
ALP BLD-CCNC: 63 U/L (ref 40–130)
ALT SERPL-CCNC: 6 U/L (ref 0–33)
ANION GAP SERPL CALCULATED.3IONS-SCNC: 17 MEQ/L (ref 7–13)
AST SERPL-CCNC: 19 U/L (ref 0–35)
BILIRUB SERPL-MCNC: 0.5 MG/DL (ref 0–1.2)
BUN BLDV-MCNC: 19 MG/DL (ref 8–23)
CALCIUM SERPL-MCNC: 8 MG/DL (ref 8.6–10.2)
CHLORIDE BLD-SCNC: 103 MEQ/L (ref 98–107)
CO2: 16 MEQ/L (ref 22–29)
CREAT SERPL-MCNC: 0.53 MG/DL (ref 0.5–0.9)
FOLATE: 10.5 NG/ML (ref 7.3–26.1)
GFR AFRICAN AMERICAN: >60
GFR NON-AFRICAN AMERICAN: >60
GLOBULIN: 4 G/DL (ref 2.3–3.5)
GLUCOSE BLD-MCNC: 94 MG/DL (ref 74–109)
HCT VFR BLD CALC: 27.5 % (ref 37–47)
HEMOGLOBIN: 8.8 G/DL (ref 12–16)
MCH RBC QN AUTO: 27.2 PG (ref 27–31.3)
MCHC RBC AUTO-ENTMCNC: 32 % (ref 33–37)
MCV RBC AUTO: 85.1 FL (ref 82–100)
PDW BLD-RTO: 17.3 % (ref 11.5–14.5)
PLATELET # BLD: 297 K/UL (ref 130–400)
POTASSIUM SERPL-SCNC: 3.3 MEQ/L (ref 3.5–5.1)
RBC # BLD: 3.23 M/UL (ref 4.2–5.4)
SODIUM BLD-SCNC: 136 MEQ/L (ref 132–144)
TOTAL PROTEIN: 6.5 G/DL (ref 6.4–8.1)
URINE CULTURE, ROUTINE: NORMAL
VITAMIN B-12: 830 PG/ML (ref 232–1245)
VITAMIN D 25-HYDROXY: 9.5 NG/ML (ref 30–100)
WBC # BLD: 7.8 K/UL (ref 4.8–10.8)

## 2018-06-20 PROCEDURE — 6360000002 HC RX W HCPCS: Performed by: INTERNAL MEDICINE

## 2018-06-20 PROCEDURE — 2500000003 HC RX 250 WO HCPCS: Performed by: INTERNAL MEDICINE

## 2018-06-20 PROCEDURE — 6370000000 HC RX 637 (ALT 250 FOR IP): Performed by: INTERNAL MEDICINE

## 2018-06-20 PROCEDURE — 36415 COLL VENOUS BLD VENIPUNCTURE: CPT

## 2018-06-20 PROCEDURE — 80053 COMPREHEN METABOLIC PANEL: CPT

## 2018-06-20 PROCEDURE — 85027 COMPLETE CBC AUTOMATED: CPT

## 2018-06-20 PROCEDURE — 93280 PM DEVICE PROGR EVAL DUAL: CPT

## 2018-06-20 PROCEDURE — 2580000003 HC RX 258: Performed by: INTERNAL MEDICINE

## 2018-06-20 PROCEDURE — 82746 ASSAY OF FOLIC ACID SERUM: CPT

## 2018-06-20 PROCEDURE — 92610 EVALUATE SWALLOWING FUNCTION: CPT

## 2018-06-20 PROCEDURE — 93005 ELECTROCARDIOGRAM TRACING: CPT

## 2018-06-20 PROCEDURE — 70450 CT HEAD/BRAIN W/O DYE: CPT

## 2018-06-20 PROCEDURE — 2060000000 HC ICU INTERMEDIATE R&B

## 2018-06-20 PROCEDURE — 93010 ELECTROCARDIOGRAM REPORT: CPT | Performed by: INTERNAL MEDICINE

## 2018-06-20 PROCEDURE — 82607 VITAMIN B-12: CPT

## 2018-06-20 PROCEDURE — 82306 VITAMIN D 25 HYDROXY: CPT

## 2018-06-20 PROCEDURE — C1785 PMKR, DUAL, RATE-RESP: HCPCS

## 2018-06-20 PROCEDURE — 71045 X-RAY EXAM CHEST 1 VIEW: CPT

## 2018-06-20 PROCEDURE — G8997 SWALLOW GOAL STATUS: HCPCS

## 2018-06-20 PROCEDURE — C1892 INTRO/SHEATH,FIXED,PEEL-AWAY: HCPCS

## 2018-06-20 PROCEDURE — G8996 SWALLOW CURRENT STATUS: HCPCS

## 2018-06-20 RX ORDER — HYDROCHLOROTHIAZIDE 12.5 MG/1
25 TABLET ORAL DAILY
Status: DISCONTINUED | OUTPATIENT
Start: 2018-06-20 | End: 2018-06-25 | Stop reason: HOSPADM

## 2018-06-20 RX ORDER — AMLODIPINE BESYLATE 5 MG/1
5 TABLET ORAL DAILY
Status: DISCONTINUED | OUTPATIENT
Start: 2018-06-20 | End: 2018-06-24

## 2018-06-20 RX ORDER — POTASSIUM CHLORIDE 20 MEQ/1
40 TABLET, EXTENDED RELEASE ORAL DAILY
Status: COMPLETED | OUTPATIENT
Start: 2018-06-20 | End: 2018-06-21

## 2018-06-20 RX ORDER — FUROSEMIDE 10 MG/ML
40 INJECTION INTRAMUSCULAR; INTRAVENOUS ONCE
Status: COMPLETED | OUTPATIENT
Start: 2018-06-20 | End: 2018-06-20

## 2018-06-20 RX ORDER — CLONIDINE HYDROCHLORIDE 0.1 MG/1
0.1 TABLET ORAL 2 TIMES DAILY
Status: DISCONTINUED | OUTPATIENT
Start: 2018-06-20 | End: 2018-06-21

## 2018-06-20 RX ADMIN — PIPERACILLIN SODIUM AND TAZOBACTAM SODIUM 3.38 G: 3; .375 INJECTION, POWDER, LYOPHILIZED, FOR SOLUTION INTRAVENOUS at 05:15

## 2018-06-20 RX ADMIN — METOROPROLOL TARTRATE 2.5 MG: 5 INJECTION, SOLUTION INTRAVENOUS at 03:48

## 2018-06-20 RX ADMIN — PIPERACILLIN SODIUM AND TAZOBACTAM SODIUM 3.38 G: 3; .375 INJECTION, POWDER, LYOPHILIZED, FOR SOLUTION INTRAVENOUS at 12:09

## 2018-06-20 RX ADMIN — CLOPIDOGREL BISULFATE 75 MG: 75 TABLET ORAL at 10:02

## 2018-06-20 RX ADMIN — Medication 10 ML: at 10:03

## 2018-06-20 RX ADMIN — POTASSIUM CHLORIDE 40 MEQ: 20 TABLET, EXTENDED RELEASE ORAL at 12:11

## 2018-06-20 RX ADMIN — ENALAPRILAT 1.25 MG: 1.25 INJECTION INTRAVENOUS at 00:41

## 2018-06-20 RX ADMIN — CLONIDINE HYDROCHLORIDE 0.1 MG: 0.1 TABLET ORAL at 22:08

## 2018-06-20 RX ADMIN — DOCUSATE SODIUM 100 MG: 100 CAPSULE, LIQUID FILLED ORAL at 10:02

## 2018-06-20 RX ADMIN — AZITHROMYCIN MONOHYDRATE 500 MG: 500 INJECTION, POWDER, LYOPHILIZED, FOR SOLUTION INTRAVENOUS at 15:50

## 2018-06-20 RX ADMIN — NITROGLYCERIN 1 INCH: 20 OINTMENT TOPICAL at 12:09

## 2018-06-20 RX ADMIN — ENALAPRILAT 1.25 MG: 1.25 INJECTION INTRAVENOUS at 05:30

## 2018-06-20 RX ADMIN — POTASSIUM CHLORIDE 40 MEQ: 20 TABLET, EXTENDED RELEASE ORAL at 10:02

## 2018-06-20 RX ADMIN — HYDRALAZINE HYDROCHLORIDE 10 MG: 20 INJECTION INTRAMUSCULAR; INTRAVENOUS at 02:39

## 2018-06-20 RX ADMIN — HYDROCHLOROTHIAZIDE 25 MG: 12.5 TABLET ORAL at 10:39

## 2018-06-20 RX ADMIN — ENALAPRILAT 1.25 MG: 1.25 INJECTION INTRAVENOUS at 12:09

## 2018-06-20 RX ADMIN — FUROSEMIDE 40 MG: 10 INJECTION, SOLUTION INTRAVENOUS at 10:39

## 2018-06-20 RX ADMIN — SODIUM CHLORIDE: 9 INJECTION, SOLUTION INTRAVENOUS at 05:33

## 2018-06-20 RX ADMIN — METOROPROLOL TARTRATE 2.5 MG: 5 INJECTION, SOLUTION INTRAVENOUS at 10:01

## 2018-06-20 RX ADMIN — NITROGLYCERIN 1 INCH: 20 OINTMENT TOPICAL at 05:30

## 2018-06-20 RX ADMIN — HYDRALAZINE HYDROCHLORIDE 100 MG: 100 TABLET, FILM COATED ORAL at 15:52

## 2018-06-20 RX ADMIN — DOCUSATE SODIUM 100 MG: 100 CAPSULE, LIQUID FILLED ORAL at 22:08

## 2018-06-20 RX ADMIN — ASPIRIN 81 MG: 81 TABLET, COATED ORAL at 10:02

## 2018-06-20 RX ADMIN — METOROPROLOL TARTRATE 2.5 MG: 5 INJECTION, SOLUTION INTRAVENOUS at 15:52

## 2018-06-20 RX ADMIN — SIMVASTATIN 20 MG: 20 TABLET, FILM COATED ORAL at 22:08

## 2018-06-20 RX ADMIN — HYDRALAZINE HYDROCHLORIDE 100 MG: 100 TABLET, FILM COATED ORAL at 05:30

## 2018-06-20 RX ADMIN — LOSARTAN POTASSIUM 100 MG: 50 TABLET ORAL at 10:02

## 2018-06-20 RX ADMIN — NITROGLYCERIN 1 INCH: 20 OINTMENT TOPICAL at 22:08

## 2018-06-20 RX ADMIN — CLONIDINE HYDROCHLORIDE 0.1 MG: 0.1 TABLET ORAL at 15:49

## 2018-06-20 RX ADMIN — PIPERACILLIN SODIUM AND TAZOBACTAM SODIUM 3.38 G: 3; .375 INJECTION, POWDER, LYOPHILIZED, FOR SOLUTION INTRAVENOUS at 22:26

## 2018-06-20 RX ADMIN — HYDRALAZINE HYDROCHLORIDE 10 MG: 20 INJECTION INTRAMUSCULAR; INTRAVENOUS at 10:01

## 2018-06-20 ASSESSMENT — PAIN SCALES - GENERAL
PAINLEVEL_OUTOF10: 0

## 2018-06-21 ENCOUNTER — APPOINTMENT (OUTPATIENT)
Dept: GENERAL RADIOLOGY | Age: 83
DRG: 242 | End: 2018-06-21
Payer: MEDICARE

## 2018-06-21 LAB
ABO/RH: NORMAL
ALBUMIN SERPL-MCNC: 1.9 G/DL (ref 3.9–4.9)
ALP BLD-CCNC: 58 U/L (ref 40–130)
ALT SERPL-CCNC: <5 U/L (ref 0–33)
ANION GAP SERPL CALCULATED.3IONS-SCNC: 12 MEQ/L (ref 7–13)
ANTIBODY SCREEN: NORMAL
AST SERPL-CCNC: 12 U/L (ref 0–35)
BILIRUB SERPL-MCNC: 0.3 MG/DL (ref 0–1.2)
BLOOD BANK DISPENSE STATUS: NORMAL
BLOOD BANK DISPENSE STATUS: NORMAL
BLOOD BANK PRODUCT CODE: NORMAL
BLOOD BANK PRODUCT CODE: NORMAL
BPU ID: NORMAL
BPU ID: NORMAL
BUN BLDV-MCNC: 22 MG/DL (ref 8–23)
C-REACTIVE PROTEIN, HIGH SENSITIVITY: 33.8 MG/L (ref 0–5)
CALCIUM SERPL-MCNC: 7.6 MG/DL (ref 8.6–10.2)
CHLORIDE BLD-SCNC: 104 MEQ/L (ref 98–107)
CO2: 19 MEQ/L (ref 22–29)
CREAT SERPL-MCNC: 0.8 MG/DL (ref 0.5–0.9)
DESCRIPTION BLOOD BANK: NORMAL
DESCRIPTION BLOOD BANK: NORMAL
GFR AFRICAN AMERICAN: >60
GFR NON-AFRICAN AMERICAN: >60
GLOBULIN: 3.3 G/DL (ref 2.3–3.5)
GLUCOSE BLD-MCNC: 102 MG/DL (ref 74–109)
HCT VFR BLD CALC: 20.6 % (ref 37–47)
HCT VFR BLD CALC: 33.7 % (ref 37–47)
HEMOGLOBIN: 11 G/DL (ref 12–16)
HEMOGLOBIN: 6.6 G/DL (ref 12–16)
MAGNESIUM: 2 MG/DL (ref 1.7–2.3)
MCH RBC QN AUTO: 27.3 PG (ref 27–31.3)
MCH RBC QN AUTO: 28.7 PG (ref 27–31.3)
MCHC RBC AUTO-ENTMCNC: 32 % (ref 33–37)
MCHC RBC AUTO-ENTMCNC: 32.6 % (ref 33–37)
MCV RBC AUTO: 85.5 FL (ref 82–100)
MCV RBC AUTO: 88 FL (ref 82–100)
PDW BLD-RTO: 17 % (ref 11.5–14.5)
PDW BLD-RTO: 17.4 % (ref 11.5–14.5)
PLATELET # BLD: 232 K/UL (ref 130–400)
PLATELET # BLD: 237 K/UL (ref 130–400)
POTASSIUM SERPL-SCNC: 3.5 MEQ/L (ref 3.5–5.1)
RBC # BLD: 2.41 M/UL (ref 4.2–5.4)
RBC # BLD: 3.83 M/UL (ref 4.2–5.4)
SEDIMENTATION RATE, ERYTHROCYTE: 77 MM (ref 0–30)
SODIUM BLD-SCNC: 135 MEQ/L (ref 132–144)
TOTAL PROTEIN: 5.2 G/DL (ref 6.4–8.1)
TROPONIN: 0.17 NG/ML (ref 0–0.01)
WBC # BLD: 5.9 K/UL (ref 4.8–10.8)
WBC # BLD: 7.5 K/UL (ref 4.8–10.8)

## 2018-06-21 PROCEDURE — 86923 COMPATIBILITY TEST ELECTRIC: CPT

## 2018-06-21 PROCEDURE — 6370000000 HC RX 637 (ALT 250 FOR IP): Performed by: INTERNAL MEDICINE

## 2018-06-21 PROCEDURE — 83735 ASSAY OF MAGNESIUM: CPT

## 2018-06-21 PROCEDURE — 2580000003 HC RX 258: Performed by: INTERNAL MEDICINE

## 2018-06-21 PROCEDURE — 84484 ASSAY OF TROPONIN QUANT: CPT

## 2018-06-21 PROCEDURE — APPNB15 APP NON BILLABLE TIME 0-15 MINS: Performed by: PHYSICIAN ASSISTANT

## 2018-06-21 PROCEDURE — 85652 RBC SED RATE AUTOMATED: CPT

## 2018-06-21 PROCEDURE — 71045 X-RAY EXAM CHEST 1 VIEW: CPT

## 2018-06-21 PROCEDURE — 6360000002 HC RX W HCPCS: Performed by: INTERNAL MEDICINE

## 2018-06-21 PROCEDURE — 86850 RBC ANTIBODY SCREEN: CPT

## 2018-06-21 PROCEDURE — 2060000000 HC ICU INTERMEDIATE R&B

## 2018-06-21 PROCEDURE — 86141 C-REACTIVE PROTEIN HS: CPT

## 2018-06-21 PROCEDURE — 36430 TRANSFUSION BLD/BLD COMPNT: CPT

## 2018-06-21 PROCEDURE — 2500000003 HC RX 250 WO HCPCS: Performed by: INTERNAL MEDICINE

## 2018-06-21 PROCEDURE — 86900 BLOOD TYPING SEROLOGIC ABO: CPT

## 2018-06-21 PROCEDURE — P9016 RBC LEUKOCYTES REDUCED: HCPCS

## 2018-06-21 PROCEDURE — 99232 SBSQ HOSP IP/OBS MODERATE 35: CPT | Performed by: INTERNAL MEDICINE

## 2018-06-21 PROCEDURE — 36415 COLL VENOUS BLD VENIPUNCTURE: CPT

## 2018-06-21 PROCEDURE — 85027 COMPLETE CBC AUTOMATED: CPT

## 2018-06-21 PROCEDURE — 80053 COMPREHEN METABOLIC PANEL: CPT

## 2018-06-21 PROCEDURE — 86901 BLOOD TYPING SEROLOGIC RH(D): CPT

## 2018-06-21 PROCEDURE — 93005 ELECTROCARDIOGRAM TRACING: CPT

## 2018-06-21 PROCEDURE — 6370000000 HC RX 637 (ALT 250 FOR IP): Performed by: SPECIALIST

## 2018-06-21 RX ORDER — SODIUM CHLORIDE 9 MG/ML
INJECTION, SOLUTION INTRAVENOUS CONTINUOUS
Status: DISCONTINUED | OUTPATIENT
Start: 2018-06-21 | End: 2018-06-21 | Stop reason: SDUPTHER

## 2018-06-21 RX ORDER — 0.9 % SODIUM CHLORIDE 0.9 %
250 INTRAVENOUS SOLUTION INTRAVENOUS ONCE
Status: DISCONTINUED | OUTPATIENT
Start: 2018-06-21 | End: 2018-06-25 | Stop reason: HOSPADM

## 2018-06-21 RX ORDER — SODIUM CHLORIDE 0.9 % (FLUSH) 0.9 %
10 SYRINGE (ML) INJECTION EVERY 12 HOURS SCHEDULED
Status: DISCONTINUED | OUTPATIENT
Start: 2018-06-21 | End: 2018-06-21 | Stop reason: SDUPTHER

## 2018-06-21 RX ORDER — SODIUM CHLORIDE 0.9 % (FLUSH) 0.9 %
10 SYRINGE (ML) INJECTION PRN
Status: DISCONTINUED | OUTPATIENT
Start: 2018-06-21 | End: 2018-06-21 | Stop reason: SDUPTHER

## 2018-06-21 RX ORDER — LOSARTAN POTASSIUM 50 MG/1
50 TABLET ORAL DAILY
Status: DISCONTINUED | OUTPATIENT
Start: 2018-06-22 | End: 2018-06-25 | Stop reason: HOSPADM

## 2018-06-21 RX ORDER — POTASSIUM CHLORIDE 20MEQ/15ML
20 LIQUID (ML) ORAL 2 TIMES DAILY
Status: COMPLETED | OUTPATIENT
Start: 2018-06-21 | End: 2018-06-22

## 2018-06-21 RX ORDER — HYDRALAZINE HYDROCHLORIDE 50 MG/1
50 TABLET, FILM COATED ORAL 2 TIMES DAILY
Status: DISCONTINUED | OUTPATIENT
Start: 2018-06-21 | End: 2018-06-23

## 2018-06-21 RX ORDER — FUROSEMIDE 10 MG/ML
20 INJECTION INTRAMUSCULAR; INTRAVENOUS ONCE
Status: COMPLETED | OUTPATIENT
Start: 2018-06-21 | End: 2018-06-21

## 2018-06-21 RX ORDER — METOPROLOL SUCCINATE 25 MG/1
25 TABLET, EXTENDED RELEASE ORAL DAILY
Status: DISCONTINUED | OUTPATIENT
Start: 2018-06-21 | End: 2018-06-25 | Stop reason: HOSPADM

## 2018-06-21 RX ADMIN — CHOLECALCIFEROL CAP 125 MCG (5000 UNIT) 5000 UNITS: 125 CAP at 09:16

## 2018-06-21 RX ADMIN — POTASSIUM CHLORIDE 40 MEQ: 20 TABLET, EXTENDED RELEASE ORAL at 09:14

## 2018-06-21 RX ADMIN — AMLODIPINE BESYLATE 5 MG: 5 TABLET ORAL at 09:15

## 2018-06-21 RX ADMIN — DOCUSATE SODIUM 100 MG: 100 CAPSULE, LIQUID FILLED ORAL at 22:31

## 2018-06-21 RX ADMIN — DOCUSATE SODIUM 100 MG: 100 CAPSULE, LIQUID FILLED ORAL at 09:15

## 2018-06-21 RX ADMIN — SIMVASTATIN 20 MG: 20 TABLET, FILM COATED ORAL at 22:31

## 2018-06-21 RX ADMIN — AZITHROMYCIN MONOHYDRATE 500 MG: 500 INJECTION, POWDER, LYOPHILIZED, FOR SOLUTION INTRAVENOUS at 22:30

## 2018-06-21 RX ADMIN — Medication 10 ML: at 09:53

## 2018-06-21 RX ADMIN — Medication 10 ML: at 22:32

## 2018-06-21 RX ADMIN — NITROGLYCERIN 1 INCH: 20 OINTMENT TOPICAL at 06:04

## 2018-06-21 RX ADMIN — POTASSIUM CHLORIDE 20 MEQ: 20 SOLUTION ORAL at 22:31

## 2018-06-21 RX ADMIN — Medication 10 ML: at 15:54

## 2018-06-21 RX ADMIN — HYDROCHLOROTHIAZIDE 25 MG: 12.5 TABLET ORAL at 09:14

## 2018-06-21 RX ADMIN — METOPROLOL SUCCINATE 25 MG: 25 TABLET, EXTENDED RELEASE ORAL at 18:41

## 2018-06-21 RX ADMIN — Medication 10 ML: at 09:00

## 2018-06-21 RX ADMIN — ENALAPRILAT 1.25 MG: 1.25 INJECTION INTRAVENOUS at 00:41

## 2018-06-21 RX ADMIN — HYDRALAZINE HYDROCHLORIDE 100 MG: 100 TABLET, FILM COATED ORAL at 06:04

## 2018-06-21 RX ADMIN — PIPERACILLIN SODIUM AND TAZOBACTAM SODIUM 3.38 G: 3; .375 INJECTION, POWDER, LYOPHILIZED, FOR SOLUTION INTRAVENOUS at 06:04

## 2018-06-21 RX ADMIN — PIPERACILLIN SODIUM AND TAZOBACTAM SODIUM 3.38 G: 3; .375 INJECTION, POWDER, LYOPHILIZED, FOR SOLUTION INTRAVENOUS at 22:30

## 2018-06-21 RX ADMIN — HYDRALAZINE HYDROCHLORIDE 50 MG: 50 TABLET, FILM COATED ORAL at 22:31

## 2018-06-21 RX ADMIN — CLONIDINE HYDROCHLORIDE 0.1 MG: 0.1 TABLET ORAL at 09:16

## 2018-06-21 RX ADMIN — PIPERACILLIN SODIUM AND TAZOBACTAM SODIUM 3.38 G: 3; .375 INJECTION, POWDER, LYOPHILIZED, FOR SOLUTION INTRAVENOUS at 15:54

## 2018-06-21 RX ADMIN — ENALAPRILAT 1.25 MG: 1.25 INJECTION INTRAVENOUS at 06:04

## 2018-06-21 RX ADMIN — LOSARTAN POTASSIUM 100 MG: 50 TABLET ORAL at 09:15

## 2018-06-21 RX ADMIN — FUROSEMIDE 20 MG: 10 INJECTION, SOLUTION INTRAVENOUS at 15:54

## 2018-06-21 RX ADMIN — Medication 10 ML: at 00:43

## 2018-06-21 ASSESSMENT — PAIN SCALES - GENERAL
PAINLEVEL_OUTOF10: 0

## 2018-06-22 PROBLEM — E43 SEVERE PROTEIN-CALORIE MALNUTRITION (HCC): Status: ACTIVE | Noted: 2018-06-22

## 2018-06-22 LAB
ANION GAP SERPL CALCULATED.3IONS-SCNC: 11 MEQ/L (ref 7–13)
BUN BLDV-MCNC: 21 MG/DL (ref 8–23)
CALCIUM SERPL-MCNC: 8 MG/DL (ref 8.6–10.2)
CHLORIDE BLD-SCNC: 101 MEQ/L (ref 98–107)
CO2: 20 MEQ/L (ref 22–29)
CREAT SERPL-MCNC: 0.63 MG/DL (ref 0.5–0.9)
GFR AFRICAN AMERICAN: >60
GFR NON-AFRICAN AMERICAN: >60
GLUCOSE BLD-MCNC: 88 MG/DL (ref 74–109)
HCT VFR BLD CALC: 31.2 % (ref 37–47)
HEMOGLOBIN: 10.8 G/DL (ref 12–16)
MAGNESIUM: 1.9 MG/DL (ref 1.7–2.3)
MCH RBC QN AUTO: 29.7 PG (ref 27–31.3)
MCHC RBC AUTO-ENTMCNC: 34.5 % (ref 33–37)
MCV RBC AUTO: 86.1 FL (ref 82–100)
PDW BLD-RTO: 16.5 % (ref 11.5–14.5)
PLATELET # BLD: 243 K/UL (ref 130–400)
POTASSIUM SERPL-SCNC: 4.3 MEQ/L (ref 3.5–5.1)
RBC # BLD: 3.63 M/UL (ref 4.2–5.4)
SODIUM BLD-SCNC: 132 MEQ/L (ref 132–144)
WBC # BLD: 8.2 K/UL (ref 4.8–10.8)

## 2018-06-22 PROCEDURE — 92526 ORAL FUNCTION THERAPY: CPT

## 2018-06-22 PROCEDURE — 36415 COLL VENOUS BLD VENIPUNCTURE: CPT

## 2018-06-22 PROCEDURE — 99232 SBSQ HOSP IP/OBS MODERATE 35: CPT | Performed by: INTERNAL MEDICINE

## 2018-06-22 PROCEDURE — 6370000000 HC RX 637 (ALT 250 FOR IP): Performed by: INTERNAL MEDICINE

## 2018-06-22 PROCEDURE — 83735 ASSAY OF MAGNESIUM: CPT

## 2018-06-22 PROCEDURE — 6370000000 HC RX 637 (ALT 250 FOR IP): Performed by: SPECIALIST

## 2018-06-22 PROCEDURE — 2580000003 HC RX 258: Performed by: INTERNAL MEDICINE

## 2018-06-22 PROCEDURE — 6360000002 HC RX W HCPCS: Performed by: INTERNAL MEDICINE

## 2018-06-22 PROCEDURE — 85027 COMPLETE CBC AUTOMATED: CPT

## 2018-06-22 PROCEDURE — 80048 BASIC METABOLIC PNL TOTAL CA: CPT

## 2018-06-22 PROCEDURE — 2060000000 HC ICU INTERMEDIATE R&B

## 2018-06-22 RX ORDER — ASPIRIN 81 MG/1
81 TABLET, CHEWABLE ORAL DAILY
Status: DISCONTINUED | OUTPATIENT
Start: 2018-06-23 | End: 2018-06-25 | Stop reason: HOSPADM

## 2018-06-22 RX ORDER — CLOPIDOGREL BISULFATE 75 MG/1
75 TABLET ORAL DAILY
Status: DISCONTINUED | OUTPATIENT
Start: 2018-06-23 | End: 2018-06-25 | Stop reason: HOSPADM

## 2018-06-22 RX ADMIN — HYDRALAZINE HYDROCHLORIDE 50 MG: 50 TABLET, FILM COATED ORAL at 20:44

## 2018-06-22 RX ADMIN — PIPERACILLIN SODIUM AND TAZOBACTAM SODIUM 3.38 G: 3; .375 INJECTION, POWDER, LYOPHILIZED, FOR SOLUTION INTRAVENOUS at 20:28

## 2018-06-22 RX ADMIN — CHOLECALCIFEROL CAP 125 MCG (5000 UNIT) 5000 UNITS: 125 CAP at 08:58

## 2018-06-22 RX ADMIN — PIPERACILLIN SODIUM AND TAZOBACTAM SODIUM 3.38 G: 3; .375 INJECTION, POWDER, LYOPHILIZED, FOR SOLUTION INTRAVENOUS at 11:57

## 2018-06-22 RX ADMIN — HYDRALAZINE HYDROCHLORIDE 50 MG: 50 TABLET, FILM COATED ORAL at 08:58

## 2018-06-22 RX ADMIN — PIPERACILLIN SODIUM AND TAZOBACTAM SODIUM 3.38 G: 3; .375 INJECTION, POWDER, LYOPHILIZED, FOR SOLUTION INTRAVENOUS at 05:11

## 2018-06-22 RX ADMIN — SIMVASTATIN 20 MG: 20 TABLET, FILM COATED ORAL at 20:44

## 2018-06-22 RX ADMIN — LOSARTAN POTASSIUM 50 MG: 50 TABLET ORAL at 08:58

## 2018-06-22 RX ADMIN — HYDROCHLOROTHIAZIDE 25 MG: 12.5 TABLET ORAL at 08:57

## 2018-06-22 RX ADMIN — Medication 10 ML: at 08:58

## 2018-06-22 RX ADMIN — Medication 10 ML: at 20:44

## 2018-06-22 RX ADMIN — METOPROLOL SUCCINATE 25 MG: 25 TABLET, EXTENDED RELEASE ORAL at 08:58

## 2018-06-22 RX ADMIN — AMLODIPINE BESYLATE 5 MG: 5 TABLET ORAL at 08:58

## 2018-06-22 RX ADMIN — POTASSIUM CHLORIDE 20 MEQ: 20 SOLUTION ORAL at 08:57

## 2018-06-22 ASSESSMENT — PAIN SCALES - GENERAL
PAINLEVEL_OUTOF10: 0

## 2018-06-23 LAB
ANION GAP SERPL CALCULATED.3IONS-SCNC: 15 MEQ/L (ref 7–13)
BLOOD CULTURE, ROUTINE: NORMAL
BUN BLDV-MCNC: 16 MG/DL (ref 8–23)
CALCIUM SERPL-MCNC: 8.2 MG/DL (ref 8.6–10.2)
CHLORIDE BLD-SCNC: 98 MEQ/L (ref 98–107)
CO2: 19 MEQ/L (ref 22–29)
CREAT SERPL-MCNC: 0.62 MG/DL (ref 0.5–0.9)
CULTURE, BLOOD 2: NORMAL
GFR AFRICAN AMERICAN: >60
GFR NON-AFRICAN AMERICAN: >60
GLUCOSE BLD-MCNC: 105 MG/DL (ref 74–109)
MAGNESIUM: 2 MG/DL (ref 1.7–2.3)
POTASSIUM SERPL-SCNC: 4.2 MEQ/L (ref 3.5–5.1)
SODIUM BLD-SCNC: 132 MEQ/L (ref 132–144)

## 2018-06-23 PROCEDURE — 36415 COLL VENOUS BLD VENIPUNCTURE: CPT

## 2018-06-23 PROCEDURE — 6370000000 HC RX 637 (ALT 250 FOR IP): Performed by: INTERNAL MEDICINE

## 2018-06-23 PROCEDURE — 6360000002 HC RX W HCPCS: Performed by: INTERNAL MEDICINE

## 2018-06-23 PROCEDURE — 2580000003 HC RX 258: Performed by: INTERNAL MEDICINE

## 2018-06-23 PROCEDURE — G8978 MOBILITY CURRENT STATUS: HCPCS

## 2018-06-23 PROCEDURE — G8988 SELF CARE GOAL STATUS: HCPCS

## 2018-06-23 PROCEDURE — 80048 BASIC METABOLIC PNL TOTAL CA: CPT

## 2018-06-23 PROCEDURE — 6370000000 HC RX 637 (ALT 250 FOR IP): Performed by: SPECIALIST

## 2018-06-23 PROCEDURE — 97530 THERAPEUTIC ACTIVITIES: CPT

## 2018-06-23 PROCEDURE — 97166 OT EVAL MOD COMPLEX 45 MIN: CPT

## 2018-06-23 PROCEDURE — G8979 MOBILITY GOAL STATUS: HCPCS

## 2018-06-23 PROCEDURE — G8987 SELF CARE CURRENT STATUS: HCPCS

## 2018-06-23 PROCEDURE — 83735 ASSAY OF MAGNESIUM: CPT

## 2018-06-23 PROCEDURE — 2060000000 HC ICU INTERMEDIATE R&B

## 2018-06-23 PROCEDURE — 97161 PT EVAL LOW COMPLEX 20 MIN: CPT

## 2018-06-23 RX ORDER — HYDRALAZINE HYDROCHLORIDE 100 MG/1
100 TABLET, FILM COATED ORAL EVERY 8 HOURS SCHEDULED
Status: DISCONTINUED | OUTPATIENT
Start: 2018-06-23 | End: 2018-06-25 | Stop reason: HOSPADM

## 2018-06-23 RX ADMIN — METOPROLOL SUCCINATE 25 MG: 25 TABLET, EXTENDED RELEASE ORAL at 08:14

## 2018-06-23 RX ADMIN — CHOLECALCIFEROL CAP 125 MCG (5000 UNIT) 5000 UNITS: 125 CAP at 08:15

## 2018-06-23 RX ADMIN — Medication 10 ML: at 08:15

## 2018-06-23 RX ADMIN — HYDRALAZINE HYDROCHLORIDE 100 MG: 100 TABLET, FILM COATED ORAL at 13:45

## 2018-06-23 RX ADMIN — HYDRALAZINE HYDROCHLORIDE 50 MG: 50 TABLET, FILM COATED ORAL at 08:14

## 2018-06-23 RX ADMIN — HYDROCHLOROTHIAZIDE 25 MG: 12.5 TABLET ORAL at 08:14

## 2018-06-23 RX ADMIN — HYDRALAZINE HYDROCHLORIDE 100 MG: 100 TABLET, FILM COATED ORAL at 20:36

## 2018-06-23 RX ADMIN — PIPERACILLIN SODIUM AND TAZOBACTAM SODIUM 3.38 G: 3; .375 INJECTION, POWDER, LYOPHILIZED, FOR SOLUTION INTRAVENOUS at 20:35

## 2018-06-23 RX ADMIN — LOSARTAN POTASSIUM 50 MG: 50 TABLET ORAL at 08:14

## 2018-06-23 RX ADMIN — AMLODIPINE BESYLATE 5 MG: 5 TABLET ORAL at 08:14

## 2018-06-23 RX ADMIN — Medication 10 ML: at 20:36

## 2018-06-23 RX ADMIN — AZITHROMYCIN MONOHYDRATE 500 MG: 500 INJECTION, POWDER, LYOPHILIZED, FOR SOLUTION INTRAVENOUS at 00:55

## 2018-06-23 RX ADMIN — CLOPIDOGREL BISULFATE 75 MG: 75 TABLET ORAL at 08:15

## 2018-06-23 RX ADMIN — PIPERACILLIN SODIUM AND TAZOBACTAM SODIUM 3.38 G: 3; .375 INJECTION, POWDER, LYOPHILIZED, FOR SOLUTION INTRAVENOUS at 05:01

## 2018-06-23 RX ADMIN — ASPIRIN 81 MG 81 MG: 81 TABLET ORAL at 08:15

## 2018-06-23 RX ADMIN — PIPERACILLIN SODIUM AND TAZOBACTAM SODIUM 3.38 G: 3; .375 INJECTION, POWDER, LYOPHILIZED, FOR SOLUTION INTRAVENOUS at 12:28

## 2018-06-23 RX ADMIN — SIMVASTATIN 20 MG: 20 TABLET, FILM COATED ORAL at 20:35

## 2018-06-23 ASSESSMENT — PAIN SCALES - GENERAL
PAINLEVEL_OUTOF10: 0

## 2018-06-24 LAB
ALBUMIN SERPL-MCNC: 2.5 G/DL (ref 3.9–4.9)
ALP BLD-CCNC: 74 U/L (ref 40–130)
ALT SERPL-CCNC: 9 U/L (ref 0–33)
ANION GAP SERPL CALCULATED.3IONS-SCNC: 14 MEQ/L (ref 7–13)
AST SERPL-CCNC: 20 U/L (ref 0–35)
BASOPHILS ABSOLUTE: 0.1 K/UL (ref 0–0.2)
BASOPHILS RELATIVE PERCENT: 0.7 %
BILIRUB SERPL-MCNC: 0.5 MG/DL (ref 0–1.2)
BUN BLDV-MCNC: 21 MG/DL (ref 8–23)
CALCIUM SERPL-MCNC: 8.8 MG/DL (ref 8.6–10.2)
CHLORIDE BLD-SCNC: 94 MEQ/L (ref 98–107)
CLOSTRIDIUM DIFFICILE DNA AMPLIFICATION: NORMAL
CO2: 24 MEQ/L (ref 22–29)
CREAT SERPL-MCNC: 0.74 MG/DL (ref 0.5–0.9)
EOSINOPHILS ABSOLUTE: 0.3 K/UL (ref 0–0.7)
EOSINOPHILS RELATIVE PERCENT: 2.4 %
GFR AFRICAN AMERICAN: >60
GFR NON-AFRICAN AMERICAN: >60
GLOBULIN: 4.1 G/DL (ref 2.3–3.5)
GLUCOSE BLD-MCNC: 118 MG/DL (ref 74–109)
HCT VFR BLD CALC: 31.9 % (ref 37–47)
HEMOGLOBIN: 10.4 G/DL (ref 12–16)
LYMPHOCYTES ABSOLUTE: 1.1 K/UL (ref 1–4.8)
LYMPHOCYTES RELATIVE PERCENT: 10.9 %
MAGNESIUM: 1.9 MG/DL (ref 1.7–2.3)
MCH RBC QN AUTO: 28.7 PG (ref 27–31.3)
MCHC RBC AUTO-ENTMCNC: 32.5 % (ref 33–37)
MCV RBC AUTO: 88 FL (ref 82–100)
MONOCYTES ABSOLUTE: 0.7 K/UL (ref 0.2–0.8)
MONOCYTES RELATIVE PERCENT: 6.8 %
NEUTROPHILS ABSOLUTE: 8.4 K/UL (ref 1.4–6.5)
NEUTROPHILS RELATIVE PERCENT: 79.2 %
PDW BLD-RTO: 17.2 % (ref 11.5–14.5)
PLATELET # BLD: 303 K/UL (ref 130–400)
POTASSIUM SERPL-SCNC: 5.1 MEQ/L (ref 3.5–5.1)
RBC # BLD: 3.62 M/UL (ref 4.2–5.4)
SODIUM BLD-SCNC: 132 MEQ/L (ref 132–144)
T3 FREE: 1.4 PG/ML (ref 2–4.4)
T4 FREE: 0.71 NG/DL (ref 0.93–1.7)
TOTAL PROTEIN: 6.6 G/DL (ref 6.4–8.1)
WBC # BLD: 10.6 K/UL (ref 4.8–10.8)

## 2018-06-24 PROCEDURE — 6360000002 HC RX W HCPCS: Performed by: INTERNAL MEDICINE

## 2018-06-24 PROCEDURE — 83735 ASSAY OF MAGNESIUM: CPT

## 2018-06-24 PROCEDURE — 6370000000 HC RX 637 (ALT 250 FOR IP): Performed by: SPECIALIST

## 2018-06-24 PROCEDURE — 80053 COMPREHEN METABOLIC PANEL: CPT

## 2018-06-24 PROCEDURE — 6370000000 HC RX 637 (ALT 250 FOR IP): Performed by: INTERNAL MEDICINE

## 2018-06-24 PROCEDURE — 2580000003 HC RX 258: Performed by: INTERNAL MEDICINE

## 2018-06-24 PROCEDURE — 84439 ASSAY OF FREE THYROXINE: CPT

## 2018-06-24 PROCEDURE — 2060000000 HC ICU INTERMEDIATE R&B

## 2018-06-24 PROCEDURE — 87493 C DIFF AMPLIFIED PROBE: CPT

## 2018-06-24 PROCEDURE — 99222 1ST HOSP IP/OBS MODERATE 55: CPT | Performed by: INTERNAL MEDICINE

## 2018-06-24 PROCEDURE — 84481 FREE ASSAY (FT-3): CPT

## 2018-06-24 PROCEDURE — 85025 COMPLETE CBC W/AUTO DIFF WBC: CPT

## 2018-06-24 PROCEDURE — 93005 ELECTROCARDIOGRAM TRACING: CPT

## 2018-06-24 PROCEDURE — 36415 COLL VENOUS BLD VENIPUNCTURE: CPT

## 2018-06-24 RX ORDER — AMLODIPINE BESYLATE 10 MG/1
10 TABLET ORAL DAILY
Status: DISCONTINUED | OUTPATIENT
Start: 2018-06-24 | End: 2018-06-25 | Stop reason: HOSPADM

## 2018-06-24 RX ORDER — LEVOTHYROXINE SODIUM 0.03 MG/1
25 TABLET ORAL DAILY
Status: DISCONTINUED | OUTPATIENT
Start: 2018-06-24 | End: 2018-06-25 | Stop reason: HOSPADM

## 2018-06-24 RX ADMIN — PIPERACILLIN SODIUM AND TAZOBACTAM SODIUM 3.38 G: 3; .375 INJECTION, POWDER, LYOPHILIZED, FOR SOLUTION INTRAVENOUS at 13:54

## 2018-06-24 RX ADMIN — HYDROCHLOROTHIAZIDE 25 MG: 12.5 TABLET ORAL at 09:56

## 2018-06-24 RX ADMIN — Medication 10 ML: at 05:01

## 2018-06-24 RX ADMIN — Medication 10 ML: at 09:55

## 2018-06-24 RX ADMIN — HYDRALAZINE HYDROCHLORIDE 100 MG: 100 TABLET, FILM COATED ORAL at 21:05

## 2018-06-24 RX ADMIN — AMLODIPINE BESYLATE 10 MG: 10 TABLET ORAL at 09:55

## 2018-06-24 RX ADMIN — CHOLECALCIFEROL CAP 125 MCG (5000 UNIT) 5000 UNITS: 125 CAP at 09:55

## 2018-06-24 RX ADMIN — LOSARTAN POTASSIUM 50 MG: 50 TABLET ORAL at 12:12

## 2018-06-24 RX ADMIN — Medication 10 ML: at 21:09

## 2018-06-24 RX ADMIN — AZITHROMYCIN MONOHYDRATE 500 MG: 500 INJECTION, POWDER, LYOPHILIZED, FOR SOLUTION INTRAVENOUS at 03:35

## 2018-06-24 RX ADMIN — DOCUSATE SODIUM 100 MG: 100 CAPSULE, LIQUID FILLED ORAL at 21:05

## 2018-06-24 RX ADMIN — ASPIRIN 81 MG 81 MG: 81 TABLET ORAL at 09:55

## 2018-06-24 RX ADMIN — SIMVASTATIN 20 MG: 20 TABLET, FILM COATED ORAL at 21:05

## 2018-06-24 RX ADMIN — CLOPIDOGREL BISULFATE 75 MG: 75 TABLET ORAL at 09:55

## 2018-06-24 RX ADMIN — LEVOTHYROXINE SODIUM 25 MCG: 25 TABLET ORAL at 17:27

## 2018-06-24 RX ADMIN — HYDRALAZINE HYDROCHLORIDE 100 MG: 100 TABLET, FILM COATED ORAL at 17:27

## 2018-06-24 RX ADMIN — PIPERACILLIN SODIUM AND TAZOBACTAM SODIUM 3.38 G: 3; .375 INJECTION, POWDER, LYOPHILIZED, FOR SOLUTION INTRAVENOUS at 21:24

## 2018-06-24 RX ADMIN — HYDRALAZINE HYDROCHLORIDE 100 MG: 100 TABLET, FILM COATED ORAL at 09:55

## 2018-06-24 RX ADMIN — PIPERACILLIN SODIUM AND TAZOBACTAM SODIUM 3.38 G: 3; .375 INJECTION, POWDER, LYOPHILIZED, FOR SOLUTION INTRAVENOUS at 05:01

## 2018-06-24 RX ADMIN — METOPROLOL SUCCINATE 25 MG: 25 TABLET, EXTENDED RELEASE ORAL at 12:12

## 2018-06-24 RX ADMIN — Medication 10 ML: at 03:37

## 2018-06-24 ASSESSMENT — PAIN SCALES - GENERAL
PAINLEVEL_OUTOF10: 0

## 2018-06-25 ENCOUNTER — HOSPITAL ENCOUNTER (INPATIENT)
Age: 83
LOS: 20 days | Discharge: HOME HEALTH | DRG: 056 | End: 2018-07-15
Attending: PHYSICAL MEDICINE & REHABILITATION | Admitting: PHYSICAL MEDICINE & REHABILITATION
Payer: MEDICARE

## 2018-06-25 VITALS
OXYGEN SATURATION: 98 % | SYSTOLIC BLOOD PRESSURE: 143 MMHG | BODY MASS INDEX: 15.24 KG/M2 | HEIGHT: 63 IN | RESPIRATION RATE: 18 BRPM | TEMPERATURE: 97.2 F | DIASTOLIC BLOOD PRESSURE: 54 MMHG | HEART RATE: 65 BPM | WEIGHT: 86 LBS

## 2018-06-25 PROBLEM — G81.91 RIGHT HEMIPARESIS (HCC): Status: ACTIVE | Noted: 2018-06-25

## 2018-06-25 PROBLEM — E03.9 HYPOTHYROIDISM: Status: ACTIVE | Noted: 2018-06-25

## 2018-06-25 PROBLEM — H91.90 HOH (HARD OF HEARING): Status: ACTIVE | Noted: 2018-06-25

## 2018-06-25 PROBLEM — D64.9 ANEMIA: Status: ACTIVE | Noted: 2018-06-25

## 2018-06-25 PROBLEM — I10 HTN (HYPERTENSION): Status: ACTIVE | Noted: 2018-06-25

## 2018-06-25 PROBLEM — I21.9 MI (MYOCARDIAL INFARCTION) (HCC): Status: ACTIVE | Noted: 2018-06-25

## 2018-06-25 PROBLEM — Z98.61 HISTORY OF PTCA: Status: ACTIVE | Noted: 2018-06-25

## 2018-06-25 PROBLEM — R26.9 ABNORMALITY OF GAIT AND MOBILITY: Status: ACTIVE | Noted: 2018-06-25

## 2018-06-25 LAB
ALBUMIN SERPL-MCNC: 2.5 G/DL (ref 3.9–4.9)
ALP BLD-CCNC: 52 U/L (ref 40–130)
ALT SERPL-CCNC: 9 U/L (ref 0–33)
ANION GAP SERPL CALCULATED.3IONS-SCNC: 14 MEQ/L (ref 7–13)
AST SERPL-CCNC: 20 U/L (ref 0–35)
BASOPHILS ABSOLUTE: 0.1 K/UL (ref 0–0.2)
BASOPHILS RELATIVE PERCENT: 0.5 %
BILIRUB SERPL-MCNC: 0.6 MG/DL (ref 0–1.2)
BUN BLDV-MCNC: 11 MG/DL (ref 8–23)
CALCIUM SERPL-MCNC: 9 MG/DL (ref 8.6–10.2)
CHLORIDE BLD-SCNC: 95 MEQ/L (ref 98–107)
CO2: 24 MEQ/L (ref 22–29)
CREAT SERPL-MCNC: 0.58 MG/DL (ref 0.5–0.9)
EKG ATRIAL RATE: 32 BPM
EKG ATRIAL RATE: 38 BPM
EKG ATRIAL RATE: 41 BPM
EKG ATRIAL RATE: 55 BPM
EKG ATRIAL RATE: 60 BPM
EKG ATRIAL RATE: 66 BPM
EKG ATRIAL RATE: 67 BPM
EKG ATRIAL RATE: 71 BPM
EKG ATRIAL RATE: 78 BPM
EKG ATRIAL RATE: 85 BPM
EKG P AXIS: 103 DEGREES
EKG P AXIS: 47 DEGREES
EKG P AXIS: 56 DEGREES
EKG P AXIS: 61 DEGREES
EKG P AXIS: 63 DEGREES
EKG P-R INTERVAL: 184 MS
EKG P-R INTERVAL: 184 MS
EKG P-R INTERVAL: 186 MS
EKG P-R INTERVAL: 188 MS
EKG P-R INTERVAL: 320 MS
EKG Q-T INTERVAL: 456 MS
EKG Q-T INTERVAL: 468 MS
EKG Q-T INTERVAL: 498 MS
EKG Q-T INTERVAL: 510 MS
EKG Q-T INTERVAL: 544 MS
EKG Q-T INTERVAL: 566 MS
EKG Q-T INTERVAL: 624 MS
EKG Q-T INTERVAL: 718 MS
EKG Q-T INTERVAL: 728 MS
EKG Q-T INTERVAL: 752 MS
EKG QRS DURATION: 142 MS
EKG QRS DURATION: 146 MS
EKG QRS DURATION: 146 MS
EKG QRS DURATION: 148 MS
EKG QRS DURATION: 152 MS
EKG QRS DURATION: 156 MS
EKG QRS DURATION: 164 MS
EKG QRS DURATION: 72 MS
EKG QRS DURATION: 84 MS
EKG QRS DURATION: 98 MS
EKG QTC CALCULATION (BAZETT): 449 MS
EKG QTC CALCULATION (BAZETT): 478 MS
EKG QTC CALCULATION (BAZETT): 522 MS
EKG QTC CALCULATION (BAZETT): 538 MS
EKG QTC CALCULATION (BAZETT): 552 MS
EKG QTC CALCULATION (BAZETT): 556 MS
EKG QTC CALCULATION (BAZETT): 568 MS
EKG QTC CALCULATION (BAZETT): 578 MS
EKG QTC CALCULATION (BAZETT): 685 MS
EKG QTC CALCULATION (BAZETT): 686 MS
EKG R AXIS: -14 DEGREES
EKG R AXIS: -15 DEGREES
EKG R AXIS: -24 DEGREES
EKG R AXIS: -45 DEGREES
EKG R AXIS: -60 DEGREES
EKG R AXIS: -72 DEGREES
EKG R AXIS: -77 DEGREES
EKG R AXIS: 35 DEGREES
EKG R AXIS: 40 DEGREES
EKG R AXIS: 9 DEGREES
EKG T AXIS: 200 DEGREES
EKG T AXIS: 51 DEGREES
EKG T AXIS: 71 DEGREES
EKG T AXIS: 78 DEGREES
EKG T AXIS: 84 DEGREES
EKG T AXIS: 87 DEGREES
EKG T AXIS: 89 DEGREES
EKG T AXIS: 89 DEGREES
EKG VENTRICULAR RATE: 38 BPM
EKG VENTRICULAR RATE: 38 BPM
EKG VENTRICULAR RATE: 50 BPM
EKG VENTRICULAR RATE: 50 BPM
EKG VENTRICULAR RATE: 55 BPM
EKG VENTRICULAR RATE: 62 BPM
EKG VENTRICULAR RATE: 66 BPM
EKG VENTRICULAR RATE: 66 BPM
EKG VENTRICULAR RATE: 67 BPM
EKG VENTRICULAR RATE: 85 BPM
EOSINOPHILS ABSOLUTE: 0.3 K/UL (ref 0–0.7)
EOSINOPHILS RELATIVE PERCENT: 2.6 %
GFR AFRICAN AMERICAN: >60
GFR NON-AFRICAN AMERICAN: >60
GLOBULIN: 3.9 G/DL (ref 2.3–3.5)
GLUCOSE BLD-MCNC: 89 MG/DL (ref 74–109)
HCT VFR BLD CALC: 31.6 % (ref 37–47)
HEMOGLOBIN: 10.6 G/DL (ref 12–16)
LYMPHOCYTES ABSOLUTE: 1.1 K/UL (ref 1–4.8)
LYMPHOCYTES RELATIVE PERCENT: 9.4 %
MCH RBC QN AUTO: 29.4 PG (ref 27–31.3)
MCHC RBC AUTO-ENTMCNC: 33.4 % (ref 33–37)
MCV RBC AUTO: 87.9 FL (ref 82–100)
MONOCYTES ABSOLUTE: 0.6 K/UL (ref 0.2–0.8)
MONOCYTES RELATIVE PERCENT: 4.8 %
NEUTROPHILS ABSOLUTE: 9.4 K/UL (ref 1.4–6.5)
NEUTROPHILS RELATIVE PERCENT: 82.7 %
PDW BLD-RTO: 17.5 % (ref 11.5–14.5)
PLATELET # BLD: 311 K/UL (ref 130–400)
PLATELET SLIDE REVIEW: NORMAL
POTASSIUM SERPL-SCNC: 4.6 MEQ/L (ref 3.5–5.1)
RBC # BLD: 3.6 M/UL (ref 4.2–5.4)
SODIUM BLD-SCNC: 133 MEQ/L (ref 132–144)
TOTAL PROTEIN: 6.4 G/DL (ref 6.4–8.1)
WBC # BLD: 11.4 K/UL (ref 4.8–10.8)

## 2018-06-25 PROCEDURE — 6360000002 HC RX W HCPCS: Performed by: INTERNAL MEDICINE

## 2018-06-25 PROCEDURE — 6370000000 HC RX 637 (ALT 250 FOR IP): Performed by: INTERNAL MEDICINE

## 2018-06-25 PROCEDURE — 6370000000 HC RX 637 (ALT 250 FOR IP): Performed by: SPECIALIST

## 2018-06-25 PROCEDURE — 80053 COMPREHEN METABOLIC PANEL: CPT

## 2018-06-25 PROCEDURE — 2580000003 HC RX 258: Performed by: INTERNAL MEDICINE

## 2018-06-25 PROCEDURE — 1180000000 HC REHAB R&B

## 2018-06-25 PROCEDURE — 93005 ELECTROCARDIOGRAM TRACING: CPT

## 2018-06-25 PROCEDURE — 36415 COLL VENOUS BLD VENIPUNCTURE: CPT

## 2018-06-25 PROCEDURE — 85025 COMPLETE CBC W/AUTO DIFF WBC: CPT

## 2018-06-25 RX ORDER — OXYCODONE HYDROCHLORIDE AND ACETAMINOPHEN 5; 325 MG/1; MG/1
1 TABLET ORAL EVERY 4 HOURS PRN
Status: CANCELLED | OUTPATIENT
Start: 2018-06-25

## 2018-06-25 RX ORDER — AMOXICILLIN AND CLAVULANATE POTASSIUM 875; 125 MG/1; MG/1
1 TABLET, FILM COATED ORAL EVERY 12 HOURS SCHEDULED
Status: CANCELLED | OUTPATIENT
Start: 2018-06-25 | End: 2018-06-30

## 2018-06-25 RX ORDER — ACETAMINOPHEN 325 MG/1
650 TABLET ORAL EVERY 4 HOURS PRN
Status: DISCONTINUED | OUTPATIENT
Start: 2018-06-25 | End: 2018-07-15 | Stop reason: HOSPADM

## 2018-06-25 RX ORDER — DOCUSATE SODIUM 100 MG/1
100 CAPSULE, LIQUID FILLED ORAL 2 TIMES DAILY
Status: CANCELLED | OUTPATIENT
Start: 2018-06-25

## 2018-06-25 RX ORDER — DOCUSATE SODIUM 100 MG/1
100 CAPSULE, LIQUID FILLED ORAL 2 TIMES DAILY
Status: DISCONTINUED | OUTPATIENT
Start: 2018-06-25 | End: 2018-07-02

## 2018-06-25 RX ORDER — ASPIRIN 81 MG/1
81 TABLET, CHEWABLE ORAL DAILY
Qty: 30 TABLET | Refills: 3 | Status: SHIPPED | OUTPATIENT
Start: 2018-06-26

## 2018-06-25 RX ORDER — HYDRALAZINE HYDROCHLORIDE 50 MG/1
100 TABLET, FILM COATED ORAL EVERY 8 HOURS SCHEDULED
Status: DISCONTINUED | OUTPATIENT
Start: 2018-06-25 | End: 2018-07-15 | Stop reason: HOSPADM

## 2018-06-25 RX ORDER — METOPROLOL SUCCINATE 25 MG/1
25 TABLET, EXTENDED RELEASE ORAL DAILY
Status: CANCELLED | OUTPATIENT
Start: 2018-06-25

## 2018-06-25 RX ORDER — LOSARTAN POTASSIUM 50 MG/1
50 TABLET ORAL DAILY
Status: CANCELLED | OUTPATIENT
Start: 2018-06-25

## 2018-06-25 RX ORDER — AMLODIPINE BESYLATE 10 MG/1
10 TABLET ORAL DAILY
Status: CANCELLED | OUTPATIENT
Start: 2018-06-25

## 2018-06-25 RX ORDER — LEVOTHYROXINE SODIUM 0.03 MG/1
25 TABLET ORAL DAILY
Status: CANCELLED | OUTPATIENT
Start: 2018-06-26

## 2018-06-25 RX ORDER — AMLODIPINE BESYLATE 10 MG/1
10 TABLET ORAL DAILY
Status: DISCONTINUED | OUTPATIENT
Start: 2018-06-25 | End: 2018-07-15 | Stop reason: HOSPADM

## 2018-06-25 RX ORDER — AMLODIPINE BESYLATE 10 MG/1
10 TABLET ORAL DAILY
Qty: 30 TABLET | Refills: 3 | Status: ON HOLD | OUTPATIENT
Start: 2018-06-26 | End: 2018-07-15

## 2018-06-25 RX ORDER — ASPIRIN 81 MG/1
81 TABLET, CHEWABLE ORAL DAILY
Status: CANCELLED | OUTPATIENT
Start: 2018-06-25

## 2018-06-25 RX ORDER — HYDRALAZINE HYDROCHLORIDE 100 MG/1
100 TABLET, FILM COATED ORAL EVERY 8 HOURS SCHEDULED
Qty: 60 TABLET | Refills: 3 | Status: ON HOLD | OUTPATIENT
Start: 2018-06-25 | End: 2018-07-15

## 2018-06-25 RX ORDER — SIMVASTATIN 20 MG
20 TABLET ORAL NIGHTLY
Status: DISCONTINUED | OUTPATIENT
Start: 2018-06-25 | End: 2018-07-15 | Stop reason: HOSPADM

## 2018-06-25 RX ORDER — LEVOTHYROXINE SODIUM 0.03 MG/1
25 TABLET ORAL DAILY
Qty: 30 TABLET | Refills: 3 | Status: ON HOLD | OUTPATIENT
Start: 2018-06-26 | End: 2018-07-15

## 2018-06-25 RX ORDER — METOPROLOL SUCCINATE 25 MG/1
25 TABLET, EXTENDED RELEASE ORAL DAILY
Qty: 30 TABLET | Refills: 3 | Status: ON HOLD | OUTPATIENT
Start: 2018-06-26 | End: 2018-07-15

## 2018-06-25 RX ORDER — HYDRALAZINE HYDROCHLORIDE 100 MG/1
100 TABLET, FILM COATED ORAL EVERY 8 HOURS SCHEDULED
Status: CANCELLED | OUTPATIENT
Start: 2018-06-25

## 2018-06-25 RX ORDER — CLOPIDOGREL BISULFATE 75 MG/1
75 TABLET ORAL DAILY
Status: DISCONTINUED | OUTPATIENT
Start: 2018-06-26 | End: 2018-07-15 | Stop reason: HOSPADM

## 2018-06-25 RX ORDER — LOSARTAN POTASSIUM 50 MG/1
50 TABLET ORAL DAILY
Qty: 30 TABLET | Refills: 3 | Status: ON HOLD | OUTPATIENT
Start: 2018-06-26 | End: 2018-07-15

## 2018-06-25 RX ORDER — SIMVASTATIN 20 MG
20 TABLET ORAL NIGHTLY
Status: CANCELLED | OUTPATIENT
Start: 2018-06-25

## 2018-06-25 RX ORDER — METOPROLOL SUCCINATE 25 MG/1
25 TABLET, EXTENDED RELEASE ORAL DAILY
Status: DISCONTINUED | OUTPATIENT
Start: 2018-06-25 | End: 2018-07-15 | Stop reason: HOSPADM

## 2018-06-25 RX ORDER — LOSARTAN POTASSIUM 50 MG/1
50 TABLET ORAL DAILY
Status: DISCONTINUED | OUTPATIENT
Start: 2018-06-26 | End: 2018-07-15 | Stop reason: HOSPADM

## 2018-06-25 RX ORDER — HYDROCHLOROTHIAZIDE 25 MG/1
25 TABLET ORAL DAILY
Status: DISCONTINUED | OUTPATIENT
Start: 2018-06-25 | End: 2018-07-15 | Stop reason: HOSPADM

## 2018-06-25 RX ORDER — ACETAMINOPHEN 325 MG/1
650 TABLET ORAL EVERY 4 HOURS PRN
Status: CANCELLED | OUTPATIENT
Start: 2018-06-25

## 2018-06-25 RX ORDER — ASPIRIN 81 MG/1
81 TABLET, CHEWABLE ORAL DAILY
Status: DISCONTINUED | OUTPATIENT
Start: 2018-06-25 | End: 2018-07-15 | Stop reason: HOSPADM

## 2018-06-25 RX ORDER — HYDROCHLOROTHIAZIDE 25 MG/1
25 TABLET ORAL DAILY
Status: CANCELLED | OUTPATIENT
Start: 2018-06-25

## 2018-06-25 RX ORDER — OXYCODONE HYDROCHLORIDE AND ACETAMINOPHEN 5; 325 MG/1; MG/1
1 TABLET ORAL EVERY 4 HOURS PRN
Status: DISCONTINUED | OUTPATIENT
Start: 2018-06-25 | End: 2018-07-15 | Stop reason: HOSPADM

## 2018-06-25 RX ORDER — AMOXICILLIN AND CLAVULANATE POTASSIUM 875; 125 MG/1; MG/1
1 TABLET, FILM COATED ORAL EVERY 12 HOURS SCHEDULED
Status: COMPLETED | OUTPATIENT
Start: 2018-06-25 | End: 2018-06-30

## 2018-06-25 RX ORDER — LEVOTHYROXINE SODIUM 0.03 MG/1
25 TABLET ORAL DAILY
Status: DISCONTINUED | OUTPATIENT
Start: 2018-06-26 | End: 2018-07-15 | Stop reason: HOSPADM

## 2018-06-25 RX ORDER — HYDROCHLOROTHIAZIDE 25 MG/1
25 TABLET ORAL DAILY
Qty: 30 TABLET | Refills: 3 | Status: ON HOLD | OUTPATIENT
Start: 2018-06-26 | End: 2018-07-15

## 2018-06-25 RX ORDER — CLOPIDOGREL BISULFATE 75 MG/1
75 TABLET ORAL DAILY
Status: CANCELLED | OUTPATIENT
Start: 2018-06-25

## 2018-06-25 RX ORDER — HYDRALAZINE HYDROCHLORIDE 20 MG/ML
10 INJECTION INTRAMUSCULAR; INTRAVENOUS EVERY 4 HOURS PRN
Status: DISCONTINUED | OUTPATIENT
Start: 2018-06-25 | End: 2018-07-15 | Stop reason: HOSPADM

## 2018-06-25 RX ORDER — HYDRALAZINE HYDROCHLORIDE 20 MG/ML
10 INJECTION INTRAMUSCULAR; INTRAVENOUS EVERY 4 HOURS PRN
Status: CANCELLED | OUTPATIENT
Start: 2018-06-25

## 2018-06-25 RX ADMIN — SIMVASTATIN 20 MG: 20 TABLET, FILM COATED ORAL at 22:45

## 2018-06-25 RX ADMIN — AMOXICILLIN AND CLAVULANATE POTASSIUM 1 TABLET: 875; 125 TABLET, FILM COATED ORAL at 22:45

## 2018-06-25 RX ADMIN — CLOPIDOGREL BISULFATE 75 MG: 75 TABLET ORAL at 09:37

## 2018-06-25 RX ADMIN — AMLODIPINE BESYLATE 10 MG: 10 TABLET ORAL at 09:36

## 2018-06-25 RX ADMIN — AMLODIPINE BESYLATE 10 MG: 10 TABLET ORAL at 20:48

## 2018-06-25 RX ADMIN — HYDROCHLOROTHIAZIDE 25 MG: 25 TABLET ORAL at 20:48

## 2018-06-25 RX ADMIN — ASPIRIN 81 MG 81 MG: 81 TABLET ORAL at 20:48

## 2018-06-25 RX ADMIN — METOPROLOL SUCCINATE 25 MG: 25 TABLET, EXTENDED RELEASE ORAL at 09:36

## 2018-06-25 RX ADMIN — HYDROCHLOROTHIAZIDE 25 MG: 12.5 TABLET ORAL at 09:36

## 2018-06-25 RX ADMIN — LEVOTHYROXINE SODIUM 25 MCG: 25 TABLET ORAL at 06:05

## 2018-06-25 RX ADMIN — HYDRALAZINE HYDROCHLORIDE 100 MG: 100 TABLET, FILM COATED ORAL at 06:05

## 2018-06-25 RX ADMIN — HYDRALAZINE HYDROCHLORIDE 100 MG: 50 TABLET, FILM COATED ORAL at 22:45

## 2018-06-25 RX ADMIN — PIPERACILLIN SODIUM AND TAZOBACTAM SODIUM 3.38 G: 3; .375 INJECTION, POWDER, LYOPHILIZED, FOR SOLUTION INTRAVENOUS at 06:04

## 2018-06-25 RX ADMIN — DOCUSATE SODIUM 100 MG: 100 CAPSULE, LIQUID FILLED ORAL at 09:37

## 2018-06-25 RX ADMIN — AZITHROMYCIN MONOHYDRATE 500 MG: 500 INJECTION, POWDER, LYOPHILIZED, FOR SOLUTION INTRAVENOUS at 02:48

## 2018-06-25 RX ADMIN — CHOLECALCIFEROL CAP 125 MCG (5000 UNIT) 5000 UNITS: 125 CAP at 09:37

## 2018-06-25 RX ADMIN — DOCUSATE SODIUM 100 MG: 100 CAPSULE, LIQUID FILLED ORAL at 22:45

## 2018-06-25 RX ADMIN — HYDRALAZINE HYDROCHLORIDE 100 MG: 100 TABLET, FILM COATED ORAL at 15:31

## 2018-06-25 RX ADMIN — LOSARTAN POTASSIUM 50 MG: 50 TABLET ORAL at 09:37

## 2018-06-25 RX ADMIN — ASPIRIN 81 MG 81 MG: 81 TABLET ORAL at 09:36

## 2018-06-25 RX ADMIN — METOPROLOL SUCCINATE 25 MG: 25 TABLET, EXTENDED RELEASE ORAL at 20:48

## 2018-06-25 RX ADMIN — Medication 10 ML: at 09:37

## 2018-06-25 ASSESSMENT — PAIN SCALES - GENERAL
PAINLEVEL_OUTOF10: 0
PAINLEVEL_OUTOF10: 0

## 2018-06-26 PROBLEM — R13.12 DYSPHAGIA, OROPHARYNGEAL PHASE: Status: ACTIVE | Noted: 2018-06-26

## 2018-06-26 PROBLEM — R32 INCONTINENT OF URINE: Status: ACTIVE | Noted: 2018-06-26

## 2018-06-26 PROBLEM — R29.90 STROKE-LIKE SYMPTOM: Status: RESOLVED | Noted: 2018-06-17 | Resolved: 2018-06-26

## 2018-06-26 PROBLEM — R47.01 APHASIA: Status: ACTIVE | Noted: 2018-06-26

## 2018-06-26 PROBLEM — I44.2 COMPLETE HEART BLOCK (HCC): Status: ACTIVE | Noted: 2018-06-26

## 2018-06-26 LAB
BILIRUBIN URINE: NEGATIVE
BLOOD, URINE: NEGATIVE
CLARITY: CLEAR
COLOR: YELLOW
GLUCOSE URINE: NEGATIVE MG/DL
KETONES, URINE: NEGATIVE MG/DL
LEUKOCYTE ESTERASE, URINE: ABNORMAL
NITRITE, URINE: NEGATIVE
PH UA: 7.5 (ref 5–9)
PROTEIN UA: NEGATIVE MG/DL
RBC UA: NORMAL /HPF (ref 0–2)
SPECIFIC GRAVITY UA: 1 (ref 1–1.03)
UROBILINOGEN, URINE: 0.2 E.U./DL
WBC UA: NORMAL /HPF (ref 0–5)

## 2018-06-26 PROCEDURE — 97530 THERAPEUTIC ACTIVITIES: CPT

## 2018-06-26 PROCEDURE — 97535 SELF CARE MNGMENT TRAINING: CPT

## 2018-06-26 PROCEDURE — 6370000000 HC RX 637 (ALT 250 FOR IP): Performed by: PHYSICAL MEDICINE & REHABILITATION

## 2018-06-26 PROCEDURE — 99222 1ST HOSP IP/OBS MODERATE 55: CPT | Performed by: PHYSICAL MEDICINE & REHABILITATION

## 2018-06-26 PROCEDURE — 87086 URINE CULTURE/COLONY COUNT: CPT

## 2018-06-26 PROCEDURE — 97166 OT EVAL MOD COMPLEX 45 MIN: CPT

## 2018-06-26 PROCEDURE — 97112 NEUROMUSCULAR REEDUCATION: CPT

## 2018-06-26 PROCEDURE — 96125 COGNITIVE TEST BY HC PRO: CPT

## 2018-06-26 PROCEDURE — 97163 PT EVAL HIGH COMPLEX 45 MIN: CPT

## 2018-06-26 PROCEDURE — 1180000000 HC REHAB R&B

## 2018-06-26 PROCEDURE — 97116 GAIT TRAINING THERAPY: CPT

## 2018-06-26 PROCEDURE — 81001 URINALYSIS AUTO W/SCOPE: CPT

## 2018-06-26 PROCEDURE — 2500000003 HC RX 250 WO HCPCS: Performed by: PHYSICAL MEDICINE & REHABILITATION

## 2018-06-26 PROCEDURE — 6370000000 HC RX 637 (ALT 250 FOR IP): Performed by: INTERNAL MEDICINE

## 2018-06-26 PROCEDURE — 92610 EVALUATE SWALLOWING FUNCTION: CPT

## 2018-06-26 PROCEDURE — 92523 SPEECH SOUND LANG COMPREHEN: CPT

## 2018-06-26 RX ORDER — BISACODYL 10 MG
10 SUPPOSITORY, RECTAL RECTAL DAILY PRN
Status: DISCONTINUED | OUTPATIENT
Start: 2018-06-26 | End: 2018-07-15 | Stop reason: HOSPADM

## 2018-06-26 RX ORDER — CINNAMON
1 OIL (ML) MISCELLANEOUS 4 TIMES DAILY
Status: DISCONTINUED | OUTPATIENT
Start: 2018-06-26 | End: 2018-06-26 | Stop reason: CLARIF

## 2018-06-26 RX ORDER — CINNAMON
1 OIL (ML) MISCELLANEOUS 3 TIMES DAILY
Status: DISCONTINUED | OUTPATIENT
Start: 2018-06-26 | End: 2018-07-15 | Stop reason: HOSPADM

## 2018-06-26 RX ORDER — SODIUM PHOSPHATE, DIBASIC AND SODIUM PHOSPHATE, MONOBASIC 7; 19 G/133ML; G/133ML
1 ENEMA RECTAL
Status: DISPENSED | OUTPATIENT
Start: 2018-06-26 | End: 2018-06-26

## 2018-06-26 RX ADMIN — AMOXICILLIN AND CLAVULANATE POTASSIUM 1 TABLET: 875; 125 TABLET, FILM COATED ORAL at 08:35

## 2018-06-26 RX ADMIN — METOPROLOL SUCCINATE 25 MG: 25 TABLET, EXTENDED RELEASE ORAL at 08:36

## 2018-06-26 RX ADMIN — HYDROCHLOROTHIAZIDE 25 MG: 25 TABLET ORAL at 08:35

## 2018-06-26 RX ADMIN — LEVOTHYROXINE SODIUM 25 MCG: 25 TABLET ORAL at 06:39

## 2018-06-26 RX ADMIN — DOCUSATE SODIUM 100 MG: 100 CAPSULE, LIQUID FILLED ORAL at 08:35

## 2018-06-26 RX ADMIN — ASPIRIN 81 MG 81 MG: 81 TABLET ORAL at 08:35

## 2018-06-26 RX ADMIN — Medication 5000 UNITS: at 08:35

## 2018-06-26 RX ADMIN — Medication: at 20:20

## 2018-06-26 RX ADMIN — HYDRALAZINE HYDROCHLORIDE 100 MG: 50 TABLET, FILM COATED ORAL at 14:17

## 2018-06-26 RX ADMIN — Medication: at 14:32

## 2018-06-26 RX ADMIN — HYDRALAZINE HYDROCHLORIDE 100 MG: 50 TABLET, FILM COATED ORAL at 06:36

## 2018-06-26 RX ADMIN — AMLODIPINE BESYLATE 10 MG: 10 TABLET ORAL at 08:35

## 2018-06-26 RX ADMIN — SIMVASTATIN 20 MG: 20 TABLET, FILM COATED ORAL at 20:18

## 2018-06-26 RX ADMIN — HYDRALAZINE HYDROCHLORIDE 100 MG: 50 TABLET, FILM COATED ORAL at 20:18

## 2018-06-26 RX ADMIN — Medication 1 DROP: at 14:17

## 2018-06-26 RX ADMIN — AMOXICILLIN AND CLAVULANATE POTASSIUM 1 TABLET: 875; 125 TABLET, FILM COATED ORAL at 20:19

## 2018-06-26 RX ADMIN — CLOPIDOGREL BISULFATE 75 MG: 75 TABLET, FILM COATED ORAL at 08:35

## 2018-06-26 RX ADMIN — DOCUSATE SODIUM 100 MG: 100 CAPSULE, LIQUID FILLED ORAL at 20:19

## 2018-06-26 RX ADMIN — LOSARTAN POTASSIUM 50 MG: 50 TABLET, FILM COATED ORAL at 08:36

## 2018-06-26 ASSESSMENT — ENCOUNTER SYMPTOMS
EYE PAIN: 0
ANAL BLEEDING: 0
CHEST TIGHTNESS: 0
CHOKING: 0
NAUSEA: 0
BACK PAIN: 1
WHEEZING: 0
PHOTOPHOBIA: 0
COUGH: 0
EYE REDNESS: 0
ABDOMINAL DISTENTION: 0
COLOR CHANGE: 0
FACIAL SWELLING: 0
CONSTIPATION: 0
ABDOMINAL PAIN: 0
TROUBLE SWALLOWING: 0
VOMITING: 0
SHORTNESS OF BREATH: 0
BLOOD IN STOOL: 0

## 2018-06-26 ASSESSMENT — PAIN SCALES - GENERAL
PAINLEVEL_OUTOF10: 0

## 2018-06-26 NOTE — PROGRESS NOTES
Facility/Department: [facility]   BEDSIDE SWALLOW EVALUATION    NAME: Kike Amaro  : 1933  MRN: [id number]    ADMISSION DATE: 2018  ADMITTING DIAGNOSIS: has Pneumonia of both lungs due to infectious organism; Severe protein-calorie malnutrition ([address] HTN (hypertension); MI (myocardial infarction); History of PTCA with stents; Anemia; [place] (hard of hearing); Hypothyroidism; Abnormality of gait and mobility due to Right hemiparesis secondary to Left Cerebral Infarcts. [facility] admit 18.; Right hemiparesis ([address] Aphasia; Dysphagia, oropharyngeal phase; Complete heart block ([address] and Incontinent of urine on her problem list.    ONSET DATE: 18    Date of Eval: 2018  Evaluating Therapist: Gee Pendleton    Recommended Diet and Intervention  Diet Solids Recommendation: Dysphagia II Mechanically Altered  Liquid Consistency Recommendation: Thin  Recommended Form of Meds: PO  Therapeutic Interventions: Diet tolerance monitoring, Patient/Family education    Compensatory Swallowing Strategies  Compensatory Swallowing Strategies: Upright as possible for all oral intake;Small bites/sips;Assist feed;Eat/Feed slowly; No straws      Reason for Referral  Kike Amaro was referred for a bedside swallow evaluation to assess the efficiency of her swallow function, identify signs and symptoms of aspiration and make recommendations regarding safe dietary consistencies, effective compensatory strategies, and safe eating environment. General  Chart Reviewed: Yes  Behavior/Cognition: Alert; Cooperative;Pleasant mood;Confused; Distractible;Requires cueing  Respiratory Status: Room air  O2 Device: None (Room air)  Communication Observation: Functional;Dysarthria;Aphasia  Follows Directions: Simple  Dentition: Dentures top; Some missing teeth (Upper: full dentures;  Lower: natural dentition)  Patient Positioning: Upright in chair  Baseline Vocal Quality: Hoarse  Prior Dysphagia History: Most recent BSE complete on 6/20/18 revealed mild-moderate oral phase dysphagia and rec: a diet of mechanical soft consistencies with thin liquids. Consistencies Administered: Reg solid;Mechanical soft;Puree; Thin - cup        Current Diet level:  Current Diet : Mech soft  Current Liquid Diet : Thin    Oral Motor Deficits  Oral/Motor  Oral Motor: Within functional limits (WFL during oral motor examination, however, decreased tongue tip elevation noted during functional speaking tasks. )    Oral Phase Dysfunction  Oral Phase  Oral Phase: Exceptions  Oral Phase Dysfunction  Impaired Mastication: Reg Solid  Spillage Left: Puree (spillage x 1/5 (unsensed))  Decreased Anterior to Posterior Transit: Reg solid (25 sec per 1/4 of farzad cracker square)  Lingual/Palatal Residue: Reg solid (minimal)  Oral Phase  Oral Phase - Comment: Mild oral phase dysphagia characterized by munch/chew pattern and rapid intake (secondary to impulsivity and cognitive deficits) resulting in excessive mastication time per regular consistencies and decreased safety with self feeding. Minimal lingual reesiduals were cleared independently with a 2nd dry swallow. Indicators of Pharyngeal Phase Dysfunction   Pharyngeal Phase  Pharyngeal Phase: WFL  Indicators of Pharyngeal Phase Dysfunction  Decreased Laryngeal Elevation:  (Pt had difficulty tolerating tactile sensation from SLP assessing laryngeal elevation. This was the only instance of coughing during the assessment. )  Pharyngeal Phase   Pharyngeal: WFL pharyngeal phase of swallow characterized by adequate laryngeal elevation and swallow onset time. No overt s/s of aspiration observed. Impression  Dysphagia Diagnosis: Mild oral stage dysphagia  Dysphagia Impression : Mild oral stage dysphagia; Dysphagia Outcome Severity Scale: Level 5: Mild dysphagia- Distant supervision.  May need one diet consistency restricted     Treatment Plan  Requires SLP Intervention: Yes  Duration/Frequency of Treatment:

## 2018-06-26 NOTE — PROGRESS NOTES
Physical Therapy Rehab Treatment Note  Facility/Department: [facility]  Room: R251/R251-01       NAME: Ursula Sanchez  : 1933 (80 y.o.)  MRN: [id number]  CODE STATUS: Limited    Date of Service: 2018  Chart Reviewed: Yes  Patient assessed for rehabilitation services?: Yes  Family / Caregiver Present: No  Other (Comment): pt required concentration on speech to convey thoughts verbally    Restrictions:  Restrictions/Precautions: Modified Diet, Fall Risk  Position Activity Restriction  Other position/activity restrictions: Pacemaker precautions       SUBJECTIVE: Subjective: Pt states she lives in [place], but unable to state what street or where in [facility]. Pt states \"I have a friend named Jasvir Nieves in [facility], do you have her number? \" (asked twice)  Response To Previous Treatment: Patient with no complaints from previous session. Pain Screening  Patient Currently in Pain: Denies  Pre Treatment Pain Screening  Pain at present: 0  Scale Used: Numeric Score  Intervention List: Patient able to continue with treatment    Post Treatment Pain Screening:denies       OBJECTIVE:   Orientation Level: Disoriented to place; Disoriented to time;Oriented to situation;Oriented to person  Follows Commands: Impaired (inconsistent with multistep directions)           Neuromuscular Education  PNF: reaching while given one UE support. NDT Treatment: Standing;Gait  (balance: static stand and side stepping)  Neuromuscular Comments: Shukri Rowan attempted: pt scored 6/56           Transfers  Sit to Stand: Stand by assistance;Minimal Assistance (varied)  Stand to sit: Minimal Assistance  Bed to Chair: Minimal assistance    Ambulation  Ambulation?: Yes  Ambulation 1  Device: No Device;Hand-Held Assist  Assistance: Minimal assistance  Quality of Gait: Narrow MICHELLE with slightly ataxic gait. Distance: 15' x 2  Stairs/Curb  Stairs?: No          ASSESSMENT/COMMENTS:  Pt with poor memory, poor balance, and poor balance reactions.   Pt admits to fear of

## 2018-06-26 NOTE — PROGRESS NOTES
Facility/Department: [facility] Initial Assessment: Physical Therapy  [facility]    NAME: Lanny Silva  : 1933  MRN: [id number]    Date of Service: 2018    Rehab Diagnosis(es):right hemiparesis secondary to left cerebral infarct    Patient Active Problem List    Diagnosis Date Noted    Aphasia 2018    Dysphagia, oropharyngeal phase 2018    Complete heart block ([address] 2018    Incontinent of urine 2018    HTN (hypertension) 2018    MI (myocardial infarction) 2018    History of PTCA with stents 2018    Anemia 2018    [place] (hard of hearing) 2018    Hypothyroidism 2018    Abnormality of gait and mobility due to Right hemiparesis secondary to Left Cerebral Infarcts.   [facility] admit 18. 2018    Right hemiparesis ([address] 2018    Severe protein-calorie malnutrition ([address] 2018    Pneumonia of both lungs due to infectious organism        Past Medical History:   Diagnosis Date    HTN (hypertension) 2018    Hypothyroidism 2018     PPM - 18    Chart Reviewed: Yes  Patient assessed for rehabilitation services?: Yes  Family / Caregiver Present: No  Other (Comment): pt required concentration on speech to convey thoughts verbally    Restrictions:  Restrictions/Precautions: Modified Diet (no straws)  Position Activity Restriction  Other position/activity restrictions: PPM        SUBJECTIVE:      Pre Treatment Pain Screening  Pain at present: 0    Post Treatment Pain Screening:  Pain Assessment  Pain Level: 0    Prior Level of Function:  Social/Functional History  Lives With: Daughter  Type of Home: House  Home Layout: One level  Home Access: Level entry  Bathroom Shower/Tub: Walk-in shower, Curtain  Bathroom Toilet: Standard  Home Equipment:  (Pt reported no DME)  Receives Help From: Family  ADL Assistance: Independent  Homemaking Assistance: Independent  Homemaking narrow MICHELLE, constant LOB with no device or st cane in any direction, decreased foot clearance, poor trunk  rotation  Distance: 15 ft x3  Comments: 3 separate trials - gait safest with ww however pt requires assist for walker management  Stairs/Curb  Stairs?: No (not safe due to LOB in gait)  Wheelchair Activities  Propulsion: No (pt to be ambulatory)    Activity Tolerance  Activity Tolerance: Patient Tolerated treatment well          Car transfers: Not tested  Walk 10 ft: Moderate Assist  Walk 50 ft with 2 turns: Not tested  Walk 150 ft in corridor: Not tested  Walking 10 ft on unlevel surface: Not tested  Picking up objects: Not tested       ASSESSMENT:  Body structures, Functions, Activity limitations: Decreased functional mobility ; Decreased strength;Decreased balance;Decreased safe awareness;Decreased sensation  Decision Making: High Complexity  History: high  Exam: high  Clinical Presentation: high    Prognosis: Good  Patient Education: balance recovery  Barriers to Learning: deficits in sensation from CVA    CLINICAL IMPRESSION: Pt demonstrates significant deficits in balance, safety, proprioception, endurance and strength resulting in deficits in mobility skills.  Pt requires PT to improve mobility skills prior to safe return to home    PLAN OF CARE:  Frequency: 1-2 treatment sessions per day, 5-7 days per week     Current Treatment Recommendations: Strengthening, Balance Training, Transfer Training, Functional Mobility Training, Endurance Training, Gait Training, Neuromuscular Re-education, Home Exercise Program, Safety Education & Training, Equipment Evaluation, Education, & procurement, Patient/Caregiver Education & Training, Stair training    Patient's Goal:  to walk alone    GOALS:  Short term goals  Short term goal 1: indep bed mobility  Short term goal 2: consistent min assist transfers  Short term goal 3: min assist gait 50 feet with appropriate device  Short term goal 4: 15/56 for [employer]

## 2018-06-26 NOTE — H&P
HISTORY & PHYSICAL       DATE OF ADMISSION:  6/25/2018    DATE OF SERVICE:  6/26/18    Subjective:    Melvin Herrmann, 80 y.o. female presents today with:     CHIEF COMPLAINT:       Neurologic Problem   The patient's primary symptoms include an altered mental status, clumsiness, focal sensory loss, focal weakness and weakness. The patient's pertinent negatives include no syncope. This is a new problem. The current episode started in the past 7 days. The neurological problem developed suddenly. The problem has been gradually improving since onset. There was left-sided, right-sided, upper extremity and lower extremity focality noted. Associated symptoms include back pain, bladder incontinence, confusion, fatigue and vertigo. Pertinent negatives include no abdominal pain, chest pain, dizziness, fever, headaches, light-headedness, nausea, neck pain, palpitations, shortness of breath or vomiting. Past treatments include walking, position change and aspirin. The treatment provided mild relief. Her past medical history is significant for a CVA and mood changes. There is no history of dementia, head trauma or liver disease. This is an 80year old female who was brought to there ER with altered mental status. She was confused the morning of admission; she woke up and was sitting on the edge of the bed and fell to the side. In the ER she was noted to be hypertensive to 687;R systolic and bradycardic to 30's. She was noted to have a complete heart block with non ST elevation infarction. Taken to the cath lab and had insertion if temporary pacemaker and left heart cath with PTCA and stenting of right coronary artery. On 06/19/18 she had a dual chamber pacemaker replacement. She was started on IV antibiotics for bilateral pneumonia. Seen by neurology, impression new infarct in the left basal ganglia and left frontal region; resume Plavix and ASA. Work-up includes CXR 06/17/18 showed NAD.   Most recent CXR 06/21/18 revealed bilateral effusion, no correlate for infiltrates noted op CT of 06/17/18. EKG 06/24/18 revealed atrial sensed ventricular paced rhthm. Abnormal EKG when compared to 06/21/18. Echo 06/18/18 revealed 60% ejection fraction, negative bubble study for significant ASD or PFO. CT Head 06/17/18 revealed round ischemic focus in the left Basal Ganglia of questionable age, otherwise no acute abnormality. CT Head 06/20/18 again noted an area of low attenuation within the left Basal Ganglia, probably lucinar infarct, also new area of low attenuation in the subcortical white matter left frontal region, centrum semiovale. May represent a new lucinar infarct. CT Chest 06/17/18 revealed RUL and RLL infiltrates. Small patch of LLL infiltrate, findings most consistent with pneumonia. The patient has been found to have severe abnormality of gait and mobility with impaired self care due to right hemiparesis secondary to Left Cerebral Infarcts and is admitted to the acute inpatient rehab program.     Transcribed from pre-admission information sheet completed by Hailey Estrada RN/mdl as directed by Dr. Reilly Monique. The patient has stabilized medically and is able to participate at acute level rehab but is too medically complex for SNF due to  need for high risk high dose opiate medication in the elderly secondary to severe pain as well as 3 out of the 3 therapies PT OT and speech         Imaging:    Imaging and other studies reviewed and discussed with patient and staff    Ct Head Wo Contrast    Result Date: 6/20/2018  CT HEAD WO CONTRAST CLINICAL HISTORY:  STROKE , follow-up COMPARISON: June 17, 2018 TECHNIQUE: Multiple unenhanced serial axial images of the brain from the vertex of the skull to the base of the skull were performed. FINDINGS: The ventricles are dilated. This is compensatory to the surrounding moderate generalized parenchymal volume loss. No mass. No midline shift.    Again note is mastoids are clear. ROUNDED ISCHEMIC FOCUS IN THE LEFT BASAL GANGLIA OF INDETERMINATE AGE. OTHERWISE NO ACUTE INTRACRANIAL PATHOLOGY. Ct Chest Wo Contrast    Result Date: 6/17/2018  EXAMINATION: CT CHEST WO CONTRAST DATE:6/17/2018 2:00 PM CLINICAL HISTORY: Anterior chest pain. Shortness of breath. hilar mass  COMPARISON:  None TECHNIQUE: Helical CT was performed through the chest utilizing 100-mL of Optiray IV contrast, All CT scans at this facility use dose modulation, iterative reconstruction, and/or weight based dosing when appropriate to reduce radiation dose to as low as reasonably achievable. FINDINGS:Patchy airspace infiltrate with air bronchograms involving the posterior right upper lobe and right lower lobe. Small patch of airspace opacity in the superior segment of the left lower lobe as well. Findings most consistent with pneumonia. No mediastinal or hilar adenopathy. Assessment of the mediastinal structures is limited on these noncontrast images but there is no evidence of a hilar mass. No additional significant abnormality                      RIGHT UPPER AND LOWER LOBE INFILTRATES. SMALL PATCH OF LEFT LOWER LOBE INFILTRATE. FINDINGS MOST CONSISTENT WITH PNEUMONIA     Xr Chest Portable    Result Date: 6/21/2018  TECHNIQUE: Single AP portable view of the chest. HISTORY: 80-year-old female status post pacer placement. COMPARISON: Multiple chest radiographs most recently dated June 20, 2018. CT chest dated June 17, 2018. FINDINGS: Cardiac pacer device projects over the left hemithorax with leads projecting over the expected location of the right atrium and right ventricle. The cardiac silhouette is upper limits of normal for portable technique, however unchanged. Trachea is midline without gross abnormality. Low lung volumes with small pleural effusions and mild patchy left basilar airspace disease with mild prominence of the interstitial markings. No pneumothorax.  Calcified atherosclerotic disease within the aortic arch. No acute osseous abnormality. 1. Suggestion of small pleural effusions with left basilar airspace disease. 2. No radiographic correlate for infiltrates noted within the right upper and lower lobes on recent comparison CT exam.    Xr Chest Portable    Result Date: 6/20/2018  EXAMINATION: CHEST PORTABLE VIEW  CLINICAL HISTORY: Myocardial infarction, follow-up COMPARISONS: June 19, 2018  FINDINGS: Single  views of the chest is submitted. The exam is limited due to patient rotation. There is a nasogastric tube. The tip is not seen but does lie below the diaphragm. There is a left-sided ICD device leads overlying the cardiac silhouette. Unchanged. The cardiac silhouette is unchanged size configuration. .  The mediastinum is unremarkable. Pulmonary vascular unremarkable. Right sided trachea. There is tortuosity and unfolding of the ascending and descending thoracic aorta. There is a left lower lobe infiltrate/consolidation. No Pneumothoraces. LEFT LOWER LOBE INFILTRATE/CONSOLIDATION. Xr Chest Portable    Result Date: 6/20/2018  XR CHEST PORTABLE : 6/19/2018 CLINICAL HISTORY:  pacemaker placement . COMPARISON: Earlier 6/19/2018. TECHNIQUE: An upright portable AP radiograph of the chest was obtained an approximately 7:27 PM. FINDINGS: There is been interval placement of a left subclavian dual-lead pacemaker with its right atrial and ventricular leads in good apparent position. The inferior vena cava approach pacer lead has been removed. An nasogastric tube remains in good apparent position A shallow inspiration is present, with mild perihilar infiltrates, greater on the left. The heart remains mildly. Trace pleural effusions suspected.  There is no gross vascular congestion, pneumothorax, or displaced fractures identified     INTERVAL PLACEMENT OF THE LEFT SUBCLAVIAN APPROACH DUAL LEAD PACEMAKER, AND GOOD APPARENT POSITION. NO EVIDENCE OF COMPLICATION. PERIHILAR INFILTRATES, GREATER ON THE LEFT WITH PROBABLE TRACE PLEURAL EFFUSIONS. Xr Chest Portable    Result Date: 6/19/2018  XR CHEST PORTABLE : 6/19/2018 CLINICAL HISTORY:  follow up infiltrates . COMPARISON: Portable chest and chest CT without contrast 6/17/2018. TECHNIQUE: A portable upright AP radiograph of the chest was obtained. FINDINGS: There is been interval placement of a nasogastric tube with its tip overlying the fundus of the stomach. A pacer lead has been placed from an inferior vena cava approach. Mild perihilar infiltrates appear to be mildly worsening bilaterally, consistent with evolving bronchopneumonia. The heart remains mildly enlarged, exaggerated by technique. There is no significant pleural effusion, vascular congestion, or pneumothorax. MILDLY EVOLVING PERIHILAR BRONCHOPNEUMONIA. NASOGASTRIC TUBE AND INFERIOR VENA CAVA APPROACH PACER LEAD IN GOOD APPARENT POSITION. Xr Chest Portable    Result Date: 6/18/2018  EXAMINATION: XR CHEST PORTABLE DATE AND TIME:6/17/2018 11:45 AM CLINICAL HISTORY: Shortness of breath   altered mental status  COMPARISONS: None FINDINGS:The heart, mediastinum and pulmonary vasculature are within normal limits. Visualized lung fields are clear. Bones unremarkable. NO  ACTIVE LUNG DISEASE.               Labs:     labs reviewed and discussed with patient and staff    Lab Results   Component Value Date    POCGLU 212 06/17/2018     Lab Results   Component Value Date     06/25/2018    K 4.6 06/25/2018    K 3.8 06/18/2018    CL 95 06/25/2018    CO2 24 06/25/2018    BUN 11 06/25/2018    CREATININE 0.58 06/25/2018    CALCIUM 9.0 06/25/2018    LABALBU 2.5 06/25/2018    BILITOT 0.6 06/25/2018    ALKPHOS 52 06/25/2018    AST 20 06/25/2018    ALT 9 06/25/2018     Lab Results   Component Value Date    WBC 11.4 06/25/2018    RBC 3.60 06/25/2018    HGB 10.6 06/25/2018    HCT 31.6 06/25/2018    MCV 87.9 06/25/2018 MCH 29.4 06/25/2018    MCHC 33.4 06/25/2018    RDW 17.5 06/25/2018     06/25/2018     Lab Results   Component Value Date    VITD25 9.5 06/20/2018     Lab Results   Component Value Date    COLORU Yellow 06/18/2018    NITRU Negative 06/18/2018    GLUCOSEU Negative 06/18/2018    KETUA Negative 06/18/2018    UROBILINOGEN 0.2 06/18/2018    BILIRUBINUR Negative 06/18/2018     Lab Results   Component Value Date    PROTIME 11.4 06/19/2018     Lab Results   Component Value Date    INR 1.1 06/19/2018         I discussed results with patient. The patient remains highly medically complex and continues to have severe problems with activities of daily living and mobility. The patient was assessed to be able to tolerate intensive rehabilitation and therefore was admitted to Rehabilitation to address these needs. Social/Functional History  Social/Functional History  Lives With: Daughter  Type of Home: House  Home Layout: One level  Home Access: Level entry  Bathroom Shower/Tub: Walk-in shower, Curtain  Bathroom Toilet: Standard  Home Equipment:  (Pt reported no DME)  Receives Help From: Family  ADL Assistance: Independent  Homemaking Assistance: Independent  Homemaking Responsibilities: No (Pt reported, \"My daughter does all of that. 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 \"   Leisure & Hobbies: watching TV         History of falls:     Patient denies history of falling      In depth analysis of complex functional data; the patient has been:    Current Rehabilitation Assessments:    Physical therapy: FIMS:  Bed Mobility: Scooting: Stand by assistance    Transfers: Sit to Stand: Minimal Assistance  Stand to sit: Moderate Assistance, Minimal Assistance (intermittent LOB post return to sit with varying assist required for balance recovery)  Bed to Chair: Contact guard assistance, Moderate assistance (mod jimmy with transfer to the right), Ambulation 1  Surface: carpet  Device: No Device, Single point cane, Rolling Walker  Assistance: Moderate assistance, Minimal assistance  Quality of Gait: narrow MICHELLE, constant LOB with no device or st cane in any direction, decreased foot clearance, poor trunk  rotation  Distance: 15 ft x3  Comments: 3 separate trials - gait safest with ww however pt requires assist for walker management,      FIMS: Bed, Chair, Wheel Chair: 3 - Requires 25-49% assistance to transfer  Walk: 1 - Total Assistance Walks/operates wheelchair < 50 feet OR requires two or more people OR patient performs < 25% of locomotion effort  Distance Walked: 15  Wheel Chair: 0 - Activity Not Assessed/Does Not Occur  Distance Traveled in Wheel Chair: 0  Stairs: 0 - Activity Does not Occur ( 0 only for the admission assessment),  , Assessment: Pt demonstrates significant deficits in balance, safety, proprioception, endurance and strength resulting in deficits in mobility skills. Pt requires PT to improve mobility skills prior to safe return to home    Occupational therapy: FIMS:  Eating:  (Did not observe)  Groomin - Did not occur  Bathin - Did not occur  Dressing-Upper: 0 - Did not occur  Dressing-Lower: 0 - Did not occur  Toiletin - Did not occur  Toilet Transfer: 0 - Did not occur,  , Assessment: Pt is an 80 YOF who presents to [facility] d/t Right hemiparesis secondary to left cerebral infarct. Pt had pacemaker placed 18 per chart review. Upon evaluation , which impacts her independence and safety in functional transfers/mobility and ADL/IADL task completion. Pt would benefit from skilled OT services to address noted deficits and improve pt's functional performance prior to discharge to [facility] restrictive environment.      Speech therapy: FIMS: Comprehension: 2 - Patient understands basic needs 25-49% of the time  Expression: 2 - Expresses basic ideas/needs 25-49% of the complaint. Review of Systems   Constitutional: Positive for activity change and fatigue. Negative for appetite change, chills, fever and unexpected weight change. HENT: Negative for ear discharge, ear pain, facial swelling, hearing loss and trouble swallowing. Eyes: Negative for photophobia, pain and redness. Respiratory: Negative for cough, choking, chest tightness, shortness of breath and wheezing. Cardiovascular: Negative for chest pain, palpitations and leg swelling. Gastrointestinal: Negative for abdominal distention, abdominal pain, anal bleeding, blood in stool, constipation, nausea and vomiting. Genitourinary: Positive for bladder incontinence. Negative for difficulty urinating, dysuria, flank pain, frequency and urgency. Musculoskeletal: Positive for arthralgias, back pain, gait problem and myalgias. Negative for neck pain and neck stiffness. Skin: Negative for color change, pallor, rash and wound. Neurological: Positive for vertigo, focal weakness and weakness. Negative for dizziness, tremors, syncope, facial asymmetry, speech difficulty, light-headedness, numbness and headaches. Hematological: Negative for adenopathy. Does not bruise/bleed easily. Psychiatric/Behavioral: Positive for confusion and sleep disturbance. Negative for agitation, behavioral problems, decreased concentration, dysphoric mood, hallucinations, self-injury and suicidal ideas. The patient is not nervous/anxious and is not hyperactive. All other systems reviewed and are negative. Objective  /63   Pulse 60   Temp 98.2 °F (36.8 °C) (Oral)   Resp 18   SpO2 99% *    Physical Exam   Constitutional: She is oriented to person, place, and time. Vital signs are normal. She appears well-developed and well-nourished. Non-toxic appearance. She does not have a sickly appearance. She does not appear ill. No distress. HENT:   Head: Normocephalic and atraumatic.    Right Ear: Hearing normal. Right lower leg: Normal.        Left lower leg: Normal.        Right foot: Normal.        Left foot: Normal.   Tender areas are indicated by numbered spot         Lymphadenopathy:     She has no cervical adenopathy. She has no axillary adenopathy. Right: No inguinal adenopathy present. Left: No inguinal adenopathy present. Neurological: She is alert and oriented to person, place, and time. She displays abnormal reflex. She displays no atrophy and no tremor. A sensory deficit is present. No cranial nerve deficit. She exhibits normal muscle tone. She has an abnormal Finger-Nose-Finger Test. Gait abnormal. Coordination normal. She displays no Babinski's sign on the right side. She displays no Babinski's sign on the left side. Skin: Skin is warm, dry and intact. No abrasion, no bruising, no ecchymosis, no laceration, no petechiae and no rash noted. Rash is not macular, not pustular and not urticarial. She is not diaphoretic. No cyanosis or erythema. No pallor. Nails show no clubbing. Bilateral upper extremity skin tears left worse than right and sacral redness   Psychiatric: Her behavior is normal. Judgment and thought content normal. Her mood appears not anxious. Her affect is not angry, not blunt, not labile and not inappropriate. Her speech is delayed. Her speech is not rapid and/or pressured, not tangential and not slurred. She is not agitated, not aggressive, not hyperactive, not slowed, not withdrawn, not actively hallucinating and not combative. Thought content is not paranoid and not delusional. Cognition and memory are impaired. She does not express impulsivity or inappropriate judgment. She exhibits a depressed mood. She expresses no homicidal and no suicidal ideation. She expresses no suicidal plans and no homicidal plans. She is communicative. She exhibits abnormal recent memory and abnormal remote memory. She is attentive. Vitals reviewed.     Back Exam     Muscle Strength   Left Quadriceps:  4/5   Left Hamstrings:  4/5           Neurologic Exam     Mental Status   Oriented to person, place, and time. Speech: not slurred   Level of consciousness: drowsy  Knowledge: inconsistent with education. Unable to read. Unable to repeat. Unable to write. Abnormal comprehension. Cranial Nerves     CN III, IV, VI   Pupils are equal, round, and reactive to light. Extraocular motions are normal.     Motor Exam   Muscle bulk: decreased  Overall muscle tone: normal  Right arm tone: decreased  Left arm tone: normal  Right leg tone: decreased  Left leg tone: normal    Strength   Left neck flexion: 4/5  Left neck extension: 4/5  Left deltoid: 4/5  Left biceps: 4/5  Left triceps: 4/5  Left wrist flexion: 4/5  Left wrist extension: 4/5  Left interossei: 4/5  Left abdominals: 4/5  Left iliopsoas: 4/5  Left quadriceps: 4/5  Left hamstrin/5  Left glutei: 4/5  Left anterior tibial: 4/5  Left posterior tibial: 4/5  Left peroneal: 4/5  Left gastroc: 4/5    Sensory Exam   Left arm light touch: normal  Left leg light touch: normal  Sensory deficit distribution on right: non-dermatomal distribution    Gait, Coordination, and Reflexes     Coordination   Finger to nose coordination: abnormal      After extensive review of the records and above physical exam, I have formulated the following diagnoses and plan:      DIAGNOSES:      1. The patient was admitted to the acute rehabilitation unit with the primary rehab diagnoses being severe abnormality of gait and mobility and impaired self care and ADL's due to  CVA with right hemiparesis    Compared to Pre-Admission Assessment, patients medical and functional status remain challengingly complex and patient continues to require intensive therapeutic intervention from multiple therapies, therefore, initiate acute intensive comprehensive inpatient rehabilitation program including PT/OT to improve balance, ambulation, ADLs, and to improve the P/AROM. pressures. They will be reassessed daily regarding their ability to participate in an acute level rehabilitation program.  Recreational Therapy will be consulted for community re-entry and adjustment to disability. Communication, cognitive and emotional issues will also be addressed during this rehabilitation stay by rehabilitation psychologist or speech therapist as appropriate. I reviewed the patient's old and current charts and discussed other rehabilitation options with the rehabilitation team including the rehab RN and the admission team as well as the patient. I feel that the patient's functional recovery would be best served at an acute inpatient rehabilitation program because the patient needs intense therapy three hours per day, direct RN supervision and daily monitoring by a physician for medical status. This cannot be sufficiently provided by home health care, a skilled nursing facility or in an outpatient setting. I further feel that the patient has the potential to improve functional abilities in an acute intensive rehabilitation program.    Old records were reviewed and summarized. 2.  Other diagnoses which complicate rehabilitation stay include:     Principal Problem:    Abnormality of gait and mobility due to Right hemiparesis secondary to Left Cerebral Infarcts. [facility] admit 06/25/18. Active Problems:  1. Pneumonia of both lungs due to infectious organism-patient is now on Augmentin every 12 hours for 5 days first dose was June 25, 2018  2. Severe protein-calorie malnutrition-dietary consult add nutritional supplementation protein and vitamin B-12  3. Complete heart block history,  HTN (hypertension), MI (myocardial infarction), History of PTCA with stents-vital signs checks every shift, monitor for chest pain, dose and titrate Norvasc, Apresoline, Plavix, hydrogen rail, Cozaar, aspirin, Toprol-XL, Zocor-consult hospitals for backup medical  4.    Anemia-recheck CBC and BMP  5.   [place] (hard of hearing)-speech therapy to provide listening devices as tolerated  6. Hypothyroidism-Synthroid 25 µg daily  7. Right hemiparesis, Aphasia, Dysphagia, oropharyngeal phase-consult neurology monitor neurologically  8. Incontinent of urine and stool-scheduled toileting rule out UTI was stat UA catheterize special then  9. Severe skin tears bilateral upper extremities -sacral redness at side to side turns in a crate mattress as well as E T-max. 10. Severe thrush-add cinnamon oil  11. Oropharyngeal dysphasia-dysphasia to mechanically altered foods no drinking straws consults recently which pathology    I am especially concerned about  severe urinary incontinence and recent cardiac stenting and pacemaker placement    The patient's high risk medication use includes  Percocet. The patient is high risk for urinary tract infection, an admission urinalysis has been ordered. I will have the nurses check post void residual bladder volumes and place a catheter if excessive urine is retained in the bladder after voiding. The patient is risk for deep venous thromosis,complex deep venous thrombosis protocol prophylaxis has been ordered  no current blood thinners because of excessive bleeding and bruising after recent surgical procedures . The patient is high risk for orthostasis and a hydration program and orthostatic blood pressure screening have been ordered. I will attempt to get old records from the patient's previous hospital stay. Care everywhere on [facility] was utilized. 3.  Current and previous medications were reviewed and summarized and compared to old medication lists from home and from the acute floor. 4.  Complex labs and x-rays were reviewed. I will review patient's old EKG and place it on the chart. 5.  Will provide emotional support for this patient regarding adjustment to their disability.   Cognition and mood will be screened daily and addressed by rehabilitation

## 2018-06-26 NOTE — PROGRESS NOTES
Subjective: The patient complains of severe  acute  right-sided weakness and aphasia and dysphagia partially relieved by  PT, OT, speech pathology and exacerbated by  overexertion and recent left cerebral infarcts. I am concerned about patients progressive depression and chronic pain issues as well as her need for Percocet for her back and neck pain. I'm concerned that she slightly but more lethargic today. I will add some fluids B12 and treat her thrush with cinnamon oil. She has new pacemaker and her cardiologist, and to see her today the pacemaker is placed in 6/19/18. I also concerned about her urinary and bowel incontinence. Apparently she's been continent of the past that we will work on that    ROS x10: The patient also complains of severely impaired mobility and activities of daily living. Otherwise no new problems with vision, hearing, nose, mouth, throat, dermal, cardiovascular, GI, , pulmonary, musculoskeletal, psychiatric or neurological. See Rehab H&P on Rehab chart dated . Vital signs:  BP (!) 149/54   Pulse 64   Temp 98 °F (36.7 °C) (Temporal)   Resp 16   SpO2 96%   I/O:   PO/Intake:  fair PO intake, no problems observed or reported. Bowel/Bladder:   Incontinent of bowel and bladder  General:  Patient is  frail and cachectic, adequately nourished, non-obese and     well kempt. HEENT:    PERRLA, hearing intact to loud voice, external inspection of ear     and nose benign. Inspection of lips, tongue and gums benign  Musculoskeletal: No significant change in strength or tone. All joints stable. Inspection and palpation of digits and nails show no clubbing,       cyanosis or inflammatory conditions. Neuro/Psychiatric: Affect: flat but pleasant. Alert and oriented to person, place and     situation. No significant change in deep tendon reflexes or     Sensation-right hemiparesis secondary CVA  Lungs:  Diminished but CTA-B.  Respiration effort is normal at rest. Heart:   S1 = S2, RRR. No loud murmurs. Abdomen:  Soft, non-tender, no enlargement of liver or spleen. Extremities:  No significant lower extremity edema or tenderness. Skin:   Intact to general survey, no visualized or palpated problems. Rehabilitation:  Physical therapy: FIMS:  Bed Mobility: Scooting: Minimal assistance    Transfers: Sit to Stand: Stand by assistance, Minimal Assistance (varied)  Stand to sit: Minimal Assistance  Bed to Chair: Minimal assistance, Ambulation 1  Surface: carpet  Device: No Device, Hand-Held Assist  Assistance: Minimal assistance  Quality of Gait: Narrow MICHELLE with slightly ataxic gait. Distance: 15' x 2  Comments: 3 separate trials - gait safest with ww however pt requires assist for walker management,      FIMS: Bed, Chair, Wheel Chair: 3 - Requires 25-49% assistance to transfer  Walk: 1 - Total Assistance Walks/operates wheelchair < 50 feet OR requires two or more people OR patient performs < 25% of locomotion effort  Distance Walked: 15  Wheel Chair: 0 - Activity Not Assessed/Does Not Occur  Distance Traveled in Wheel Chair: 0  Stairs: 0 - Activity Does not Occur ( 0 only for the admission assessment),  , Assessment: Pt demonstrates significant deficits in balance, safety, proprioception, endurance and strength resulting in deficits in mobility skills. Pt requires PT to improve mobility skills prior to safe return to home    Occupational therapy: FIMS:  Eatin - Feeds self with setup/supervision/cues and/or requires only setup/supervision/cues to perform tube feedings  Grooming: 3 - Able to perform 2-3 tasks (mod assist)  Bathin - Able to bathe 3-4 areas  Dressing-Upper: 3 - Requires assist with 2 tasks  Dressing-Lower: 2 - Requires assist with 4-5 parts of dressing  Toiletin - Did not occur  Toilet Transfer: 0 - Did not occur  Shower Transfer: 0 - Activity does not occur,  , Assessment: Pt. in bed on therapist arrival and asleep.  Despite therapist encouragement pt. very lethargic and requires significant assistance to awaken and perform ADLs. Pt. appears more confused today, with eyes closed and mumbling throughout ADL. Pt. with significant assist required for all sequencing, all transfers. Pt. has dereased balance and endurance and significantly decreased . Pt. would benefit from     Speech therapy: FIMS: Comprehension: 2 - Patient understands basic needs 25-49% of the time  Expression: 1 - Expresses basic ideas/needs < 25% of the time  Social Interaction: 2 - Patient appropriate  25%-49% of the time  Problem Solvin - Patient solves simple/routine tasks < 25%  Memory: 1 - Patient remembers < 25% of the time      Lab/X-ray studies reviewed, analyzed and discussed with patient and staff:   Recent Results (from the past 24 hour(s))   Urinalysis    Collection Time: 18  5:45 PM   Result Value Ref Range    Color, UA Yellow Straw/Yellow    Clarity, UA Clear Clear    Glucose, Ur Negative Negative mg/dL    Bilirubin Urine Negative Negative    Ketones, Urine Negative Negative mg/dL    Specific Gravity, UA 1.005 1.005 - 1.030    Blood, Urine Negative Negative    pH, UA 7.5 5.0 - 9.0    Protein, UA Negative Negative mg/dL    Urobilinogen, Urine 0.2 <2.0 E.U./dL    Nitrite, Urine Negative Negative    Leukocyte Esterase, Urine TRACE (A) Negative   Microscopic Urinalysis    Collection Time: 18  5:45 PM   Result Value Ref Range    WBC, UA 0-2 0 - 5 /HPF    RBC, UA None seen 0 - 2 /HPF   CBC    Collection Time: 18  5:44 AM   Result Value Ref Range    WBC 8.5 4.8 - 10.8 K/uL    RBC 3.63 (L) 4.20 - 5.40 M/uL    Hemoglobin 10.8 (L) 12.0 - 16.0 g/dL    Hematocrit 32.0 (L) 37.0 - 47.0 %    MCV 88.1 82.0 - 100.0 fL    MCH 29.6 27.0 - 31.3 pg    MCHC 33.6 33.0 - 37.0 %    RDW 17.5 (H) 11.5 - 14.5 %    Platelets 738 895 - 378 K/uL       Previous extensive, complex labs, notes and diagnostics reviewed and analyzed.      ALLERGIES:    Allergies as of 2018  (No Known Allergies)      (please also verify by checking [facility])     Complex Physical Medicine & Rehab Issues Assess & Plan:   1. 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 Functional and medical status reassessed regarding patients ability to participate in therapies and patient found to be able to participate in acute intensive comprehensive inpatient rehabilitation program including PT/OT to improve balance, ambulation, ADLs, and to improve the P/AROM. Therapeutic modifications regarding activities in therapies, place, amount of time per day and intensity of therapy made daily. In bed therapies or bedside therapies prn.   2. Bowel and Bladder dysfunction-bowel and Urinary incontinence:  frequent toileting, ambulate to bathroom with assistance, check post void residuals. Check for C.difficile x1 if >2 loose stools in 24 hours, continue bowel & bladder program.  Monitor bowel and bladder function. Lactinex 2 PO every AC. MOM prn, Brown Bomb prn, Glycerin suppository prn, enema prn. Scheduled toileting add neurogenic bowel program.  3. Severe neck and low back pain generalized OA pain: reassess pain every shift and prior to and after each therapy session, give prn  Percocet or Tylenol, modalities prn in therapy, Lidoderm, K-pad prn.   4. Skin healing at new pacer site and breakdown risk:  continue pressure relief program.  Daily skin exams and reports from nursing. Betadine to incision  5. Severe frailty and fatigue due to Nutritional and hydration deficiency:  continue to monitor I&Os, calorie counts prn, dietary consult prn. Add protein supplementation vitamin B12 and CoQ10  6. Acute episodic insomnia with situational adjustment disorder:  prn Ambien, monitor for day time sedation.   7. Falls risk elevated:  patient to use call light to get nursing assistance to get up, mattress as well as E T-max. 10. Severe thrush-add cinnamon oil  11. Oropharyngeal dysphasia-dysphasia to mechanically altered foods no drinking straws consults recently which pathology  12.  Severe thrush-cinnamon oil 4 times a day             Travon Neff D.O., PM&R     Attending    [address]

## 2018-06-26 NOTE — PROGRESS NOTES
Facility/Department: [facility]  Initial Speech/Language/Cognitive Assessment    NAME: Perry Smith  : 1933   MRN: [id number]  ADMISSION DATE: 2018  ADMITTING DIAGNOSIS: has Pneumonia of both lungs due to infectious organism; Severe protein-calorie malnutrition ([address] HTN (hypertension); MI (myocardial infarction); History of PTCA with stents; Anemia; [place] (hard of hearing); Hypothyroidism; Abnormality of gait and mobility due to Right hemiparesis secondary to Left Cerebral Infarcts. [facility] admit 18.; Right hemiparesis ([address] Aphasia; Dysphagia, oropharyngeal phase; Complete heart block ([address] and Incontinent of urine on her problem list.  DATE ONSET: 18    Date of Eval: 2018   Evaluating Therapist: EMMANUELLE Go    Assessment:  Cognitive Diagnosis: Pt presents with severe cognitive deficits characterized by decreased orientation, immediate/working/short term memory, problem solving, and attention negatively affecting her safety and independence and placing pt at high risk for falls. Speech Diagnosis: Pt presents with mild dysarthria characterized by decreased tongue tip elevation resulting in decreased speech intelligibility for effective communication with familiar and unfamiliar listeners. Communication Diagnosis: Pt presents with moderate linguistic deficits characterized by decreased comprehension of conditional and multi-step directions, decreased semantic organization/word finding, and decreased thought organization negatively affecting her ability to participate in meaningful communication exchanges with familiar and unfamiliar listeners. Diagnosis: Mild dysarthria; Moderate communication impairment; Severe cognitive impairement    Recommendations:  Requires SLP Intervention: Yes  Duration/Frequency of Treatment: 3-5Xs/Wk cog-lang;  1-2x/week mech soft/thin; assist feed  D/C Recommendations:  To be determined       Plan:   Goals:  Short-term Goals  Timeframe for Short-term Goals: 3 weeks  Goal 1: Pt will follow conditional directions with 70% accuracy with min cues to increase the pt's ability to follow directions in the home setting with new and familiar situations. Goal 2: Pt will name 10/10 items in a concrete category with min cues to promote semantic organization and help the patient express her basic personal, safety, and medical wants and needs. Goal 3: Pt will sustain attention to tasks for 10 minutes with occasional redirection to promote pt's ability to participate in function therapeutic activities. Goal 4: Pt will complete working memory tasks with 75% acc with mod verbal cues to promote STM function and overall independence with ADLs. Goal 5: Pt will demonstrate use of compensatory strategies that promote speech intelligibility in 4/5 given opportunities during structured tasks with SLP so that she may better relate information to others regarding her personal, medical, and safety needs. Long-term Goals  Timeframe for Long-term Goals: 3 weeks  Goal 1: Pt will improve her Language Abilities to a Min Assist level for effective communication with familiar and unfamiliar communication partners so they may functionally communicate and express safety/medical concerns. Goal 2: Pt will improve her Speech Intelligibility to Modified Independent level for effective communication of wants, needs, feelings, ideas, and medical/safety information with familiar and unfamiliar listeners. Goal 3: Pt will improve Cognition to a Min Assist level for safe completion of ADLs and decreased dependency upon caregivers. Patient/family involved in developing goals and treatment plan: no    Subjective:   Previous level of function and limitations: see below  General  Chart Reviewed: Yes  Subjective  Subjective: Pt was pleasant, cooperative, but confused and distractible during assessment.    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 \"   Hearing  Hearing: Exceptions to [facility]  Hearing Exceptions: Hard of hearing/hearing concerns           Objective:     Oral/Motor  Oral Motor: Within functional limits (WFL during oral motor examination, however, decreased tongue tip elevation noted during functional speaking tasks. )    Auditory Comprehension  Comprehension: Exceptions  Yes/No Questions:  [facility])  Basic Questions:  [facility])  Complex Questions: Moderate  One Step Basic Commands:  (WFL)  Two Step Basic Commands:  (WFL)  Multistep Basic Commands: Mild  Complex/Abstract Commands: Moderate  Pictures:  (WFL)  Conversation: Moderate  Interfering Components: Attention - selective; Attention - sustained; Attention - divided;Processing speed; Hearing; Working memory  Effective Techniques: Extra processing time; Increased volume;Pausing;Repetition; Slowed speech;Visual/Gestural cues    Reading Comprehension  Reading Status: Exceptions to [facility]  Words Impairment Severity:  [facility])  Phrases Impairment Severity:  (WFL)  Sentence Impairment Severity: Mild  Paragraph Impairment Severity: To be assessed in therapy  Interfering Components: Attention; Working memory; Attention to detail  Effective Techniques: Large print;Verbal cue    Expression  Primary Mode of Expression: Verbal    Verbal Expression  Verbal Expression: Exceptions to functional limits  Initiation: Mild   Repetition: Moderate  Automatic Speech: Moderate (Counting: Pt counted 1-11 and then skipped to # 20;  DEVON: needed cues to initiate then stated Sunday twice;   CHRIS: needed cues to initiate then stopped at July)  Confrontation:  [facility])  Convergent: Severe (unable to complete in any given opportunities)  Divergent: Severe (Pt able to name 2/10 items in a concrete category)  Conversation: Moderate  Interfering Components: Attention; Impaired thought organization;Limited error awareness;Paraphasia  Effective Techniques: Provide extra time    Written

## 2018-06-26 NOTE — PROGRESS NOTES
Occupational Therapy   Occupational Therapy Initial Assessment  Date: 2018   Patient Name: Perry Smith  MRN: [id number]     : 1933    Date of Service: 2018    Discharge Recommendations:  Continue to assess pending progress         Patient Diagnosis(es): There were no encounter diagnoses. has a past medical history of HTN (hypertension) and Hypothyroidism. has no past surgical history on file. Restrictions  Restrictions/Precautions  Restrictions/Precautions: Modified Diet, Fall Risk  Position Activity Restriction  Other position/activity restrictions: Pacemaker precautions    Subjective   General  Chart Reviewed: Yes  Patient assessed for rehabilitation services?: Yes  Family / Caregiver Present: No  Referring Practitioner: Dr. Ginger Ding  Diagnosis: Right hemiparesis secondary to left cerebral infarct   Pain Assessment  Patient Currently in Pain: Denies  Pain Assessment: 0-10  Pain Level: 0  Pre Treatment Pain Screening  Pain at present: 0  Scale Used: Numeric Score  Intervention List: Patient able to continue with treatment  Oxygen Therapy  SpO2: 99 %  O2 Device: None (Room air)    Social/Functional History  Social/Functional History  Lives With: Daughter  Type of Home: House  Home Layout: One level  Home Access: Level entry  Bathroom Shower/Tub: Walk-in shower, Curtain  Bathroom Toilet: Standard  Home Equipment:  (Pt reported no DME)  Receives Help From: Family  ADL Assistance: Independent  Homemaking Assistance: Independent  Homemaking Responsibilities: No (Pt reported, \"My daughter does all of that. 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 \"   Leisure & Hobbies: watching TV        Objective   Vision: Impaired  Vision Exceptions: Wears glasses at all times (Pt demo'd visual scanning [facility] and reported no acute changes in vision)  Hearing: Exceptions to

## 2018-06-26 NOTE — PROGRESS NOTES
week  Plan weeks: 2-3 weeks   Current Treatment Recommendations: Strengthening, ROM, Balance Training, Functional Mobility Training, Endurance Training, Neuromuscular Re-education, Cognitive Reorientation, Safety Education & Training, Patient/Caregiver Education & Training, Equipment Evaluation, Education, & procurement, Self-Care / ADL, Home Management Training, Cognitive/Perceptual Training  Plan Comment: Continue per OT POC     Goals  Patient Goals   Patient goals : \"Take care of myself. \"        Therapy Time   Individual Concurrent Group Co-treatment   Time In 1301         Time Out 1401         Minutes [facility] [place] [place]  Electronically signed by Connie Boggs OT on 6/26/2018 at 2:08 PM

## 2018-06-27 LAB
HCT VFR BLD CALC: 32 % (ref 37–47)
HEMOGLOBIN: 10.8 G/DL (ref 12–16)
MCH RBC QN AUTO: 29.6 PG (ref 27–31.3)
MCHC RBC AUTO-ENTMCNC: 33.6 % (ref 33–37)
MCV RBC AUTO: 88.1 FL (ref 82–100)
PDW BLD-RTO: 17.5 % (ref 11.5–14.5)
PLATELET # BLD: 355 K/UL (ref 130–400)
RBC # BLD: 3.63 M/UL (ref 4.2–5.4)
WBC # BLD: 8.5 K/UL (ref 4.8–10.8)

## 2018-06-27 PROCEDURE — 6370000000 HC RX 637 (ALT 250 FOR IP): Performed by: INTERNAL MEDICINE

## 2018-06-27 PROCEDURE — 36415 COLL VENOUS BLD VENIPUNCTURE: CPT

## 2018-06-27 PROCEDURE — 93010 ELECTROCARDIOGRAM REPORT: CPT | Performed by: INTERNAL MEDICINE

## 2018-06-27 PROCEDURE — 85027 COMPLETE CBC AUTOMATED: CPT

## 2018-06-27 PROCEDURE — 97530 THERAPEUTIC ACTIVITIES: CPT

## 2018-06-27 PROCEDURE — 97535 SELF CARE MNGMENT TRAINING: CPT

## 2018-06-27 PROCEDURE — 1180000000 HC REHAB R&B

## 2018-06-27 PROCEDURE — 99232 SBSQ HOSP IP/OBS MODERATE 35: CPT | Performed by: PHYSICAL MEDICINE & REHABILITATION

## 2018-06-27 RX ADMIN — ASPIRIN 81 MG 81 MG: 81 TABLET ORAL at 13:13

## 2018-06-27 RX ADMIN — AMOXICILLIN AND CLAVULANATE POTASSIUM 1 TABLET: 875; 125 TABLET, FILM COATED ORAL at 13:14

## 2018-06-27 RX ADMIN — DOCUSATE SODIUM 100 MG: 100 CAPSULE, LIQUID FILLED ORAL at 13:11

## 2018-06-27 RX ADMIN — LEVOTHYROXINE SODIUM 25 MCG: 25 TABLET ORAL at 07:00

## 2018-06-27 RX ADMIN — CLOPIDOGREL BISULFATE 75 MG: 75 TABLET, FILM COATED ORAL at 13:13

## 2018-06-27 RX ADMIN — Medication 5000 UNITS: at 13:11

## 2018-06-27 RX ADMIN — DOCUSATE SODIUM 100 MG: 100 CAPSULE, LIQUID FILLED ORAL at 19:44

## 2018-06-27 RX ADMIN — AMLODIPINE BESYLATE 10 MG: 10 TABLET ORAL at 13:13

## 2018-06-27 RX ADMIN — LOSARTAN POTASSIUM 50 MG: 50 TABLET, FILM COATED ORAL at 13:11

## 2018-06-27 RX ADMIN — Medication: at 19:45

## 2018-06-27 RX ADMIN — AMOXICILLIN AND CLAVULANATE POTASSIUM 1 TABLET: 875; 125 TABLET, FILM COATED ORAL at 19:43

## 2018-06-27 RX ADMIN — HYDROCHLOROTHIAZIDE 25 MG: 25 TABLET ORAL at 13:12

## 2018-06-27 RX ADMIN — HYDRALAZINE HYDROCHLORIDE 100 MG: 50 TABLET, FILM COATED ORAL at 06:59

## 2018-06-27 RX ADMIN — HYDRALAZINE HYDROCHLORIDE 100 MG: 50 TABLET, FILM COATED ORAL at 19:44

## 2018-06-27 RX ADMIN — Medication 1 DROP: at 13:17

## 2018-06-27 RX ADMIN — METOPROLOL SUCCINATE 25 MG: 25 TABLET, EXTENDED RELEASE ORAL at 13:12

## 2018-06-27 RX ADMIN — Medication: at 13:15

## 2018-06-27 RX ADMIN — Medication: at 07:04

## 2018-06-27 RX ADMIN — SIMVASTATIN 20 MG: 20 TABLET, FILM COATED ORAL at 19:44

## 2018-06-27 RX ADMIN — HYDRALAZINE HYDROCHLORIDE 100 MG: 50 TABLET, FILM COATED ORAL at 13:12

## 2018-06-27 ASSESSMENT — PAIN SCALES - GENERAL
PAINLEVEL_OUTOF10: 0

## 2018-06-27 NOTE — PROGRESS NOTES
Occupational Therapy  Facility/Department: [facility]  Daily Treatment Note  NAME: Aj Tanner  : 1933  MRN: [id number]    Date of Service: 2018    Discharge Recommendations:  Continue to assess pending progress       Patient Diagnosis(es): There were no encounter diagnoses. has a past medical history of HTN (hypertension) and Hypothyroidism. has no past surgical history on file. Restrictions  Restrictions/Precautions  Restrictions/Precautions: Modified Diet, Fall Risk  Position Activity Restriction  Other position/activity restrictions: Pacemaker precautions  Subjective   General  Chart Reviewed: Yes  Patient assessed for rehabilitation services?: Yes  Family / Caregiver Present: No  Referring Practitioner: Dr. Luzma Perry  Diagnosis: Right hemiparesis secondary to left cerebral infarct   Pre Treatment Pain Screening  Pain at present: 0  Scale Used: Numeric Score  Intervention List: Patient able to continue with treatment  Pain Assessment  Patient Currently in Pain: No  Pain Assessment: 0-10  Pain Level: 0  Vital Signs  Patient Currently in Pain: No   Orientation     Objective    ADL  Feeding: Supervision;Verbal cueing; Increased time to complete (Pt. required verbal cues for each individual bite. Pt. did not initiate any bites of food and declined more after 4 bites. )  Grooming: Moderate assistance (Pt. declined brushing teeth. Total A to brush hair due to tangles; pt. made attempt but was unable to complete effectively. Pt. washed face/hands with verbal cues.)  UE Bathing: Maximum assistance (Hand over hand assist for thoroughness)  LE Bathing: Dependent/Total (Attempted to wash mahsa area but had decreased thoroughness and required cues)  UE Dressing: Moderate assistance (Assist for all steps of bra, but was able to sequence donning shirt independently.  Min A to pull shirt down over trunk)  LE Dressing: Maximum assistance (Pt. independently threaded the legs of depends and put B legs into

## 2018-06-27 NOTE — PROGRESS NOTES
Physical Therapy Missed Treatment   Facility/Department: [facility] MED SURG R251/R251-01    NAME: Jassi Varma  Patient Status:   : 1933 (80 y.o.)  MRN: [id number]  Account: [de-identified]  Gender: female        [x] Patient Declines PT Treatment pt declined to get up into chair. Opened her eyes and said no to therapy now. Will check on pt later after she rests a little more. [] Patient Unavailable: Will attempt PT Treatment again at earliest convenience.         Electronically signed by Adelita Berman PTA on 18 at 3:03 PM

## 2018-06-27 NOTE — PROGRESS NOTES
Dinner tray delivered. Pt laying in bed sleeping. Pt was awoken and asked if she was going to eat dinner. Pt states, \"not right now. \" Will continue to monitor on rounds.

## 2018-06-27 NOTE — PROGRESS NOTES
(kevan. Linda Edgar etc)  7. Functional Status: Fall precautions. 8. Diet: as ordered.            Electronically signed by Michelle Gomez DO on 6/27/2018 at 3:48 PM

## 2018-06-27 NOTE — PROGRESS NOTES
Pt presented with a change from this morning, very drowsy. I perfect served Dr. Tommy Jay to inform the physician of the change. Doctor at bedside at 13:00, he said that is the pt's baseline. He said she was stable, & we will continue to monitor. At 14:58 Dr. Arturo Pendleton called with update on dressing status, he said to leave the dressing in place & he will remove on follow up appointment.  Electronically signed by Prem Garner RN on 6/27/2018 at 3:50 PM

## 2018-06-27 NOTE — PROGRESS NOTES
Physical Therapy Missed Treatment   Facility/Department: [facility] MED SURG R251/R251-01    NAME: Félix Clark  Patient Status:   : 1933 (80 y.o.)  MRN: [id number]  Account: [de-identified]  Gender: female        [x] Patient Declines PT Treatment pt still sleeping very sound and I was unable to keep her awake to have her participate in therapy. Pt was not able to participate in either am or pm PT sessions this date. [] Patient Unavailable: Will attempt PT Treatment again at earliest convenience.         Electronically signed by [facility] HARI DAVIDSON PTA on 18 at 4:12 PM

## 2018-06-27 NOTE — PROGRESS NOTES
Physical Therapy  Unable to wake pt up this morning. Pt breathing normally and heart rate steady. Nursing also attempted.   Make up time scheduled in PM.  Electronically signed by Vicky Dorado PTA on 6/27/2018 at 10:53 AM

## 2018-06-27 NOTE — PROGRESS NOTES
Occupational Therapy  Facility/Department: [facility]  Daily Treatment Note  NAME: El Heller  : 1933  MRN: [id number]    Date of Service: 2018    Discharge Recommendations:  Continue to assess pending progress       Patient Diagnosis(es): There were no encounter diagnoses. has a past medical history of HTN (hypertension) and Hypothyroidism. has no past surgical history on file. Restrictions  Restrictions/Precautions  Restrictions/Precautions: Modified Diet, Fall Risk  Position Activity Restriction  Other position/activity restrictions: Pacemaker precautions    Subjective \"I want to sleep. \"   General  Chart Reviewed: Yes  Patient assessed for rehabilitation services?: Yes  Response to previous treatment: Patient with no complaints from previous session  Family / Caregiver Present: No  Referring Practitioner: Dr. Bakari Cohn  Diagnosis: Right hemiparesis secondary to left cerebral infarct   Pre Treatment Pain Screening  Pain at present: 0  Scale Used: Numeric Score  Intervention List: Patient able to continue with treatment  Pain Assessment  Patient Currently in Pain: Denies  Pain Assessment: 0-10  Pain Level: 0  Vital Signs  Patient Currently in Pain: Denies     Orientation  Orientation  Overall Orientation Status: Impaired  Orientation Level: Oriented to person    Objective    ADL  Feeding: Supervision;Verbal cueing; Increased time to complete (Pt required verbal cues for pacing when drinking from cup. Pt required verbal cues to initiate taking each bite. Pt required cues to use fork for \"poking\" food and spoon for \"scooping\" food. Pt attempted to use knife to eat food x 1. Pt demo'd no self initiation of taking food bites. Pt was unable to open packages without use of backward chaining. Pt opened 7/7 containers using backward chaining. )  UE Bathing: Minimal assistance (Pt required verbal cues to sequence each step of brushing upper dentures and lower teeth.  Pt required A for thoroughness of Plan  Times per week:  minutes per day, 5-7 days per week  Plan weeks: 2-3 weeks   Current Treatment Recommendations: Strengthening, ROM, Balance Training, Functional Mobility Training, Endurance Training, Neuromuscular Re-education, Cognitive Reorientation, Safety Education & Training, Patient/Caregiver Education & Training, Equipment Evaluation, Education, & procurement, Self-Care / ADL, Home Management Training, Cognitive/Perceptual Training  Plan Comment: Continue per OT POC     Goals  Patient Goals   Patient goals : \"Take care of myself. \"        Therapy Time   Individual Concurrent Group Co-treatment   Time In 1300         Time Out 1402         Minutes [facility]  Electronically signed by Cher Casiano OT on 6/27/2018 at 2:14 PM

## 2018-06-28 ENCOUNTER — APPOINTMENT (OUTPATIENT)
Dept: ULTRASOUND IMAGING | Age: 83
DRG: 056 | End: 2018-06-28
Attending: PHYSICAL MEDICINE & REHABILITATION
Payer: MEDICARE

## 2018-06-28 ENCOUNTER — APPOINTMENT (OUTPATIENT)
Dept: GENERAL RADIOLOGY | Age: 83
DRG: 056 | End: 2018-06-28
Attending: PHYSICAL MEDICINE & REHABILITATION
Payer: MEDICARE

## 2018-06-28 LAB
ANION GAP SERPL CALCULATED.3IONS-SCNC: 16 MEQ/L (ref 7–13)
BASOPHILS ABSOLUTE: 0.1 K/UL (ref 0–0.2)
BASOPHILS RELATIVE PERCENT: 0.9 %
BUN BLDV-MCNC: 15 MG/DL (ref 8–23)
CALCIUM SERPL-MCNC: 8.9 MG/DL (ref 8.6–10.2)
CHLORIDE BLD-SCNC: 89 MEQ/L (ref 98–107)
CO2: 24 MEQ/L (ref 22–29)
CREAT SERPL-MCNC: 0.77 MG/DL (ref 0.5–0.9)
EOSINOPHILS ABSOLUTE: 0.2 K/UL (ref 0–0.7)
EOSINOPHILS RELATIVE PERCENT: 2.5 %
GFR AFRICAN AMERICAN: >60
GFR NON-AFRICAN AMERICAN: >60
GLUCOSE BLD-MCNC: 90 MG/DL (ref 74–109)
HCT VFR BLD CALC: 29.9 % (ref 37–47)
HEMOGLOBIN: 9.8 G/DL (ref 12–16)
LYMPHOCYTES ABSOLUTE: 1 K/UL (ref 1–4.8)
LYMPHOCYTES RELATIVE PERCENT: 12.5 %
MCH RBC QN AUTO: 28.9 PG (ref 27–31.3)
MCHC RBC AUTO-ENTMCNC: 32.6 % (ref 33–37)
MCV RBC AUTO: 88.6 FL (ref 82–100)
MONOCYTES ABSOLUTE: 0.6 K/UL (ref 0.2–0.8)
MONOCYTES RELATIVE PERCENT: 7.4 %
NEUTROPHILS ABSOLUTE: 6 K/UL (ref 1.4–6.5)
NEUTROPHILS RELATIVE PERCENT: 76.7 %
PDW BLD-RTO: 17.8 % (ref 11.5–14.5)
PLATELET # BLD: 374 K/UL (ref 130–400)
POTASSIUM SERPL-SCNC: 3.9 MEQ/L (ref 3.5–5.1)
RBC # BLD: 3.38 M/UL (ref 4.2–5.4)
SODIUM BLD-SCNC: 129 MEQ/L (ref 132–144)
WBC # BLD: 7.7 K/UL (ref 4.8–10.8)

## 2018-06-28 PROCEDURE — 97533 SENSORY INTEGRATION: CPT

## 2018-06-28 PROCEDURE — 1180000000 HC REHAB R&B

## 2018-06-28 PROCEDURE — 97535 SELF CARE MNGMENT TRAINING: CPT

## 2018-06-28 PROCEDURE — 71046 X-RAY EXAM CHEST 2 VIEWS: CPT

## 2018-06-28 PROCEDURE — 99233 SBSQ HOSP IP/OBS HIGH 50: CPT | Performed by: PHYSICAL MEDICINE & REHABILITATION

## 2018-06-28 PROCEDURE — 85025 COMPLETE CBC W/AUTO DIFF WBC: CPT

## 2018-06-28 PROCEDURE — 92526 ORAL FUNCTION THERAPY: CPT

## 2018-06-28 PROCEDURE — 97127 HC OT THER IVNTJ W/FOCUS COG FUNCJ: CPT

## 2018-06-28 PROCEDURE — 6370000000 HC RX 637 (ALT 250 FOR IP): Performed by: INTERNAL MEDICINE

## 2018-06-28 PROCEDURE — 36415 COLL VENOUS BLD VENIPUNCTURE: CPT

## 2018-06-28 PROCEDURE — 97116 GAIT TRAINING THERAPY: CPT

## 2018-06-28 PROCEDURE — 6370000000 HC RX 637 (ALT 250 FOR IP): Performed by: PHYSICAL MEDICINE & REHABILITATION

## 2018-06-28 PROCEDURE — 93971 EXTREMITY STUDY: CPT

## 2018-06-28 PROCEDURE — 92507 TX SP LANG VOICE COMM INDIV: CPT

## 2018-06-28 PROCEDURE — 97530 THERAPEUTIC ACTIVITIES: CPT

## 2018-06-28 PROCEDURE — 80048 BASIC METABOLIC PNL TOTAL CA: CPT

## 2018-06-28 PROCEDURE — 97112 NEUROMUSCULAR REEDUCATION: CPT

## 2018-06-28 RX ORDER — MODAFINIL 100 MG/1
100 TABLET ORAL 2 TIMES DAILY
Status: DISCONTINUED | OUTPATIENT
Start: 2018-06-28 | End: 2018-07-03

## 2018-06-28 RX ADMIN — AMLODIPINE BESYLATE 10 MG: 10 TABLET ORAL at 10:42

## 2018-06-28 RX ADMIN — Medication: at 19:22

## 2018-06-28 RX ADMIN — Medication: at 06:14

## 2018-06-28 RX ADMIN — LEVOTHYROXINE SODIUM 25 MCG: 25 TABLET ORAL at 06:15

## 2018-06-28 RX ADMIN — HYDRALAZINE HYDROCHLORIDE 100 MG: 50 TABLET, FILM COATED ORAL at 19:20

## 2018-06-28 RX ADMIN — HYDRALAZINE HYDROCHLORIDE 100 MG: 50 TABLET, FILM COATED ORAL at 12:58

## 2018-06-28 RX ADMIN — HYDRALAZINE HYDROCHLORIDE 100 MG: 50 TABLET, FILM COATED ORAL at 06:14

## 2018-06-28 RX ADMIN — ASPIRIN 81 MG 81 MG: 81 TABLET ORAL at 10:40

## 2018-06-28 RX ADMIN — HYDROCHLOROTHIAZIDE 25 MG: 25 TABLET ORAL at 10:40

## 2018-06-28 RX ADMIN — Medication 1 DROP: at 13:03

## 2018-06-28 RX ADMIN — MODAFINIL 100 MG: 100 TABLET ORAL at 14:42

## 2018-06-28 RX ADMIN — DOCUSATE SODIUM 100 MG: 100 CAPSULE, LIQUID FILLED ORAL at 10:40

## 2018-06-28 RX ADMIN — LOSARTAN POTASSIUM 50 MG: 50 TABLET, FILM COATED ORAL at 10:42

## 2018-06-28 RX ADMIN — Medication: at 12:59

## 2018-06-28 RX ADMIN — AMOXICILLIN AND CLAVULANATE POTASSIUM 1 TABLET: 875; 125 TABLET, FILM COATED ORAL at 10:41

## 2018-06-28 RX ADMIN — AMOXICILLIN AND CLAVULANATE POTASSIUM 1 TABLET: 875; 125 TABLET, FILM COATED ORAL at 19:19

## 2018-06-28 RX ADMIN — DOCUSATE SODIUM 100 MG: 100 CAPSULE, LIQUID FILLED ORAL at 19:20

## 2018-06-28 RX ADMIN — SIMVASTATIN 20 MG: 20 TABLET, FILM COATED ORAL at 19:20

## 2018-06-28 RX ADMIN — MODAFINIL 100 MG: 100 TABLET ORAL at 10:41

## 2018-06-28 RX ADMIN — Medication 1 DROP: at 19:21

## 2018-06-28 RX ADMIN — Medication 5000 UNITS: at 10:41

## 2018-06-28 RX ADMIN — METOPROLOL SUCCINATE 25 MG: 25 TABLET, EXTENDED RELEASE ORAL at 10:40

## 2018-06-28 RX ADMIN — CLOPIDOGREL BISULFATE 75 MG: 75 TABLET, FILM COATED ORAL at 10:41

## 2018-06-28 ASSESSMENT — PAIN SCALES - GENERAL
PAINLEVEL_OUTOF10: 0

## 2018-06-28 NOTE — PROGRESS NOTES
Dr. Viji Mauricio office called back at this time. Remove dressing where pacemaker was placed today, physician ordered tests for edema in arms, & physician is going to come up to see pt.  Electronically signed by Prem Garner RN on 6/28/2018 at 12:45 PM

## 2018-06-28 NOTE — CONSULTS
thyroid symmetric, not enlarged and no tenderness, skin normal  LUNGS:  No increased work of breathing, good air exchange, bilateral crackles or wheezing. PPM left Chest ( some ouzing post op )  CARDIOVASCULAR:  Normal apical impulse, regular rate and rhythm, normal S1 and S2,  and 1/6 murmur noted  ABDOMEN:  Normal bowel sounds, soft, non-distended, non-tender, no masses palpated, no hepatosplenomegally  EXTREMITIES:  No edema, Pulses strong throughout. Left Arm Mild Edema. NEUROLOGIC:  Awake, alert, not oriented. Following all commands and moving all extremties  SKIN:  no bruising or bleeding, normal skin color, texture, turgor and no rashes    Labs:  Recent Results (from the past 24 hour(s))   CBC Auto Differential    Collection Time: 06/28/18  5:38 AM   Result Value Ref Range    WBC 7.7 4.8 - 10.8 K/uL    RBC 3.38 (L) 4.20 - 5.40 M/uL    Hemoglobin 9.8 (L) 12.0 - 16.0 g/dL    Hematocrit 29.9 (L) 37.0 - 47.0 %    MCV 88.6 82.0 - 100.0 fL    MCH 28.9 27.0 - 31.3 pg    MCHC 32.6 (L) 33.0 - 37.0 %    RDW 17.8 (H) 11.5 - 14.5 %    Platelets 355 692 - 555 K/uL    Neutrophils % 76.7 %    Lymphocytes % 12.5 %    Monocytes % 7.4 %    Eosinophils % 2.5 %    Basophils % 0.9 %    Neutrophils # 6.0 1.4 - 6.5 K/uL    Lymphocytes # 1.0 1.0 - 4.8 K/uL    Monocytes # 0.6 0.2 - 0.8 K/uL    Eosinophils # 0.2 0.0 - 0.7 K/uL    Basophils # 0.1 0.0 - 0.2 K/uL       EKG:  Atrial-sensed ventricular-paced rhythm  Abnormal ECG     Echo:    Left ventricular ejection fraction is visually estimated at 60%.  Mild concentric left ventricular hypertrophy.  Alayne Blunt is DUST (discrete upper septal thickening) without evidence of   outflow tract obstruction.   Impaired relaxation compatible with diastolic dysfunction.  ( reversed E/A   ratio)   Abnormal diastolic dysfunction by tissue doppler index.   Negative bubble study for significant ASD or PFO   No previous exam to compare   Moderate (2+) mitral regurgitation is present.   Some diastolic mitral regurgitation   Thickened aortic valve leaflets noted. Heavily calcified   There is severe fibrocalcific sclerosis of the aortic valve with evidence   of at least mild aortic stenosis with mean gradient across the aortic   valve of approximately mmHg and calculated aortic valve area was 2.2   square centimeters.  However, 2-D imaging is suggestive of more mild to   moderate aortic stenosis.   all suggesting mild to moderate aortic stenosis.   Mild-to-moderate aortic regurgitation is noted.(1-2+)   Pressure half-time 1014 msec .   There is mild to moderate ( 2 +) tricuspid regurgitation with estimated   RVSP of 54 mm Hg.--moderate pulmonary      CATH 6/17/18:  See Report  1V CAD RCA, S/P ED   Normal LV Function, 50%  TPM placed.     PPM: 6/19/18  MRI compatible dual chamber generator [employer] [place] XT DR MRI;  Model number [id number], serial number [id number]. Derrick Curtis MD  [address] [facility] Cardiologist      Electronically signed on 6/28/18 at 12:44 PM      -----

## 2018-06-28 NOTE — PROGRESS NOTES
Physical Therapy Rehab Treatment Note  Facility/Department: [facility]  Room: R251R251-01       NAME: Jah Decker  : 1933 (80 y.o.)  MRN: [id number]  CODE STATUS: Limited    Date of Service: 2018  Patient assessed for rehabilitation services?: Yes    Restrictions:  Restrictions/Precautions: Modified Diet (no straws)  Position Activity Restriction  Other position/activity restrictions: PPM        SUBJECTIVE:            Pre and Post Treatment Pain Screenin/10    - PT observed right wrist and hand swollen. Pt with active function in wrist hand and fingers.  Pt did report pain during swiss ball ex - eliminated with stopping task - nursing notified and observed hand for comparison to past and for in future    OBJECTIVE:               Neuromuscular Education  PNF: standing balance tasks, staic, dynamic with and without UE support  NDT Treatment: Gait  (varying directions in gait in parallel bars with no UE pain reported)  Neuromuscular Comments: attempted balance tasks with swiss ball throw and catch however pt indicated left hand pain - activity not continued      Bed mobility  Bridging: Stand by assistance  Rolling to Left: Stand by assistance  Rolling to Right: Stand by assistance  Supine to Sit: Stand by assistance  Sit to Supine: Stand by assistance  Scooting: Stand by assistance  Comment: on mat with decreased awareness of body position and increased effort to adjust position noted    Transfers  Sit to Stand: Stand by assistance;Minimal Assistance  Stand to sit: Stand by assistance;Minimal Assistance  Bed to Chair: Stand by assistance;Minimal assistance  Comment: intermittent LOB primarily posterior noted however variable assist for correction required - pt with poor carry over of technique and poor awareness of environmental safety    Ambulation  Ambulation?: Yes  More Ambulation?: Yes  Ambulation 1  Surface: carpet  Device: No Device  Assistance: Minimal assistance  Quality of Gait: Narrow

## 2018-06-28 NOTE — PROGRESS NOTES
Pt incontinent of urine and stool at this time. Mahsa care performed. Sensi care cream/ ET mix applied to mahsa area and buttocks. Will continue to check pt for incontinence.

## 2018-06-28 NOTE — PROGRESS NOTES
Pt incontinent of urine at this time. Mahsa care performed. ET mix/ sensi care cream applied to buttocks & mahsa area.

## 2018-06-28 NOTE — PROGRESS NOTES
Occupational Therapy  Facility/Department: [facility]  Daily Treatment Note  NAME: Juju Cintron  : 1933  MRN: [id number]    Date of Service: 2018    Discharge Recommendations:  Continue to assess pending progress       Patient Diagnosis(es): There were no encounter diagnoses. has a past medical history of HTN (hypertension) and Hypothyroidism. has no past surgical history on file. Restrictions  Restrictions/Precautions  Restrictions/Precautions: Modified Diet, Fall Risk  Position Activity Restriction  Other position/activity restrictions: Pacemaker precautions  Subjective   General  Chart Reviewed: Yes  Patient assessed for rehabilitation services?: Yes  Response to previous treatment: Patient with no complaints from previous session  Family / Caregiver Present: No  Referring Practitioner: Dr. Kassy Stack  Diagnosis: Right hemiparesis secondary to left cerebral infarct   Pre Treatment Pain Screening  Pain at present: 0  Scale Used: Numeric Score  Intervention List: Patient able to continue with treatment  Pain Assessment  Patient Currently in Pain: Denies  Pain Assessment: 0-10  Pain Level: 0  Vital Signs  Patient Currently in Pain: Denies   Orientation     Objective    Pt. engaged in cognitive and scanning task to improve independence with ADL and IADL activities. Pt. was able to state her name today but unable to state where she was, month of the year or what kind of facility she was in. Pt. pleasant when reoriented. Pt. 50% accurate when naming suits of cards. Attempted scanning task matching cards on board, but pt. was unable to be verbally cued to scan entire board due to decreased awareness of directional cues. Pt. was able to match 10 to 10 or Q to Q or spade to spade but unable to fully sequence to fill entire board. Task was altered to match only suits of cards. Pt. required step by step verbal cues to draw a new card each time.  Pt. required min cues to notice errors; increased time and 70% accurate. Pt. noted to have edema in L hand; pt. reports that it is swollen but not painful. Pt. tolerated gentle touching of hand by therapist and repositioning to decrease edema. Assessment   Performance deficits / Impairments: Decreased functional mobility ; Decreased ADL status; Decreased safe awareness;Decreased balance;Decreased strength;Decreased endurance;Decreased high-level IADLs;Decreased fine motor control;Decreased coordination;Decreased cognition  Assessment: Pt. more alert today and demonstrated improved attention. Pt. continues to demonstrate decresaed scanning, problem solving and memory and continues to benefit from skilled OT to address these areas and improve independence with ADLs. REQUIRES OT FOLLOW UP: Yes  Activity Tolerance  Activity Tolerance: Patient Tolerated treatment well  Activity Tolerance: Pt required min cues to maintain arousal throughout session. Safety Devices  Safety Devices in place: Yes  Type of devices: All fall risk precautions in place          Plan   Plan  Times per week:  minutes per day, 5-7 days per week  Plan weeks: 2-3 weeks   Current Treatment Recommendations: Strengthening, ROM, Balance Training, Functional Mobility Training, Endurance Training, Neuromuscular Re-education, Cognitive Reorientation, Safety Education & Training, Patient/Caregiver Education & Training, Equipment Evaluation, Education, & procurement, Self-Care / ADL, Home Management Training, Cognitive/Perceptual Training  Plan Comment: Continue per OT POC   G-Code  OutComes Score  AM-PAC Score    Goals  Patient Goals   Patient goals : \"Take care of myself. \"        Therapy Time   Individual Concurrent Group Co-treatment   Time In 1130         Time Out 1200         Minutes [address]/L Electronically signed by MERY Mcnamara/L on 6/28/2018 at 4:27 PM

## 2018-06-28 NOTE — PROGRESS NOTES
80% of opportunities. Goal 2: Pt will name 10/10 items in a concrete category with min cues to promote semantic organization and help the patient express her basic personal, safety, and medical wants and needs. Pt required max cues and model to 5 name items in a category in 3/3 opportunities. Goal 3: Pt will sustain attention to tasks for 10 minutes with occasional redirection to promote pt's ability to participate in function therapeutic activities. Pt attended to simple conversation and tasks throughout 30 minute session, but often responded by saying: \"I don't know. \"    Goal 4: Pt will complete working memory tasks with 75% acc with mod verbal cues to promote STM function and overall independence with ADLs. Pt completed working memory task focused on recalling two-step directions immediately for completion of task with max cues and frequent repetition to complete task with 80% acc. Goal 5: Pt will demonstrate use of compensatory strategies that promote speech intelligibility in 4/5 given opportunities during structured tasks with SLP so that she may better relate information to others regarding her personal, medical, and safety needs. - not addressed today     Pt observed to consume thin liquids via cup with no overt s/s of aspiration in 5/5 sips. Pt observed to consume fruit cocktail (peaches) with prolonged mastication and frequent cues to take small bites, no overt s/s of aspiration observed in 5/5 bites. Treatment/Activity Tolerance:     [x]  Patient tolerated treatment well []  Patient limited by fatique    []  Patient limited by pain  []  Patient limited by other medical complications   []  Other:     Plan: [x]  Continue per plan of care []  Alter current plan (see comments)    []  Plan of care initiated []  Hold pending MD visit []  Discharge    Pain:  [x] Pt demonstrated no s/s of pain during this visit.   none  N/A      Patient/ Caregiver Education:  [x] Patient/Caregiver

## 2018-06-28 NOTE — PROGRESS NOTES
Subjective: The patient complains of severe  acute  right-sided weakness and aphasia and dysphagia partially relieved by  PT, OT, speech pathology and exacerbated by  overexertion and recent left cerebral infarcts. I am concerned about patients progressive depression and chronic pain issues as well as her need for Percocet for her back and neck pain. I'm concerned that she slightly but more lethargic today-I wonder if she has sleep apnea and will add Provigil. I'll also order a nocturnal pulse ox. I will add some fluids B12 and treat her thrush with cinnamon oil. She has new pacemaker and her cardiologist, and to see her today the pacemaker is placed in 6/19/18. I also concerned about her urinary and bowel incontinence. Apparently she's been continent of the past that we will work on that    ROS x10: The patient also complains of severely impaired mobility and activities of daily living. Otherwise no new problems with vision, hearing, nose, mouth, throat, dermal, cardiovascular, GI, , pulmonary, musculoskeletal, psychiatric or neurological. See Rehab H&P on Rehab chart dated . Vital signs:  BP (!) 134/58   Pulse 72   Temp 97 °F (36.1 °C)   Resp 16   SpO2 95%   I/O:   PO/Intake:  fair PO intake, no problems observed or reported. Bowel/Bladder:   Incontinent of bowel and bladder  General:  Patient is  frail and cachectic, adequately nourished, non-obese and     well kempt. HEENT:    PERRLA, hearing intact to loud voice, external inspection of ear     and nose benign. Inspection of lips, tongue and gums benign  Musculoskeletal: No significant change in strength or tone. All joints stable. Inspection and palpation of digits and nails show no clubbing,       cyanosis or inflammatory conditions. Neuro/Psychiatric: Affect: flat but pleasant. Alert and oriented to person, place and     situation.   No significant change in deep tendon reflexes or     Sensation-right hemiparesis B12 and CoQ10 add Megace add protein supplementation  6. Acute episodic insomnia with situational adjustment disorder:  prn Ambien, monitor for day time sedation. 7. Falls risk elevated:  patient to use call light to get nursing assistance to get up, bed and chair alarm. 8. Elevated DVT risk: progressive activities in PT, continue prophylaxis MELISSA hose, elevation and  consult neurology and cardiology to find with blood thinners she can tolerate-she is currently taking aspirin and Plavix  9. Complex discharge planning:  Discharge July 15, 2018 home with daughter who works and home health care PT OT RN aide  and speech therapist as well as a rolling walker Weekly team meeting every  Thursday to assess progress towards goals, discuss and address social, psychological and medical comorbidities and to address difficulties they may be having progressing in therapy. Patient and family education is in progress. The patient is to follow-up with their family physician after discharge. Complex Active General Medical Issues that complicate care Assess & Plan:        1.  acute likely aspiration related Pneumonia of both lungs due to infectious organism-patient is now on Augmentin every 12 hours for 5 days first dose was June 25, 2018, prevent  aspiration  -feeding only with assist up in chair with modified diet and consult speech and language pathology-she is on Augmentin  2. Severe protein-calorie malnutrition-dietary consult add nutritional supplementation protein and vitamin B-12  3. Complete heart block history with recent pacemaker placement,  HTN (hypertension), MI (myocardial infarction), History of PTCA with stents-vital signs checks every shift, monitor for chest pain, dose and titrate Norvasc, Apresoline, Plavix, hydrogen rail, Cozaar, aspirin, Toprol-XL, Zocor-consult hospitals for backup medical-cardiology will come in to check pacer. 4.   Anemia-recheck CBC and BMP  5.    [place] (hard of hearing)-speech therapy to provide listening devices as tolerated  6. Hypothyroidism-Synthroid 25 µg daily  7. Right hemiparesis, Aphasia, Dysphagia, oropharyngeal phase-consult neurology monitor neurologically  8. Incontinent of urine and stool-scheduled toileting rule out UTI was stat UA catheterize special then  9. Severe skin tears bilateral upper extremities -sacral redness at side to side turns in a crate mattress as well as E T-max. 10. Severe thrush- increase cinnamon oil  11. Oropharyngeal dysphasia-dysphasia to mechanically altered foods no drinking straws consults recently which pathology  12.  Severe thrush-cinnamon oil 4 times a day             Guzman Coello D.O., PM&R     Attending    [address]

## 2018-06-28 NOTE — PROGRESS NOTES
step cues pt. was able to count up to 33 when removing rings. Pt. was also able to fold towels with BUE. Sit to stand: Stand by assistance  Stand to sit: Stand by assistance  Comment: With verbal and tactile cues for safety and sequencing  Functional Mobility  Functional Mobility Comments:      Wheelchair Bed Transfers  Wheelchair/Bed - Technique: Stand pivot  Equipment Used: Wheelchair  Level of Asssistance: Contact guard assistance  Wheelchair Transfers Comments: CGA but improved safety and positioning, however pt. positioned herself too high in bed and required increased time to reposition down into the bed lower  Bed mobility  Sit to Supine: Stand by assistance  Scooting: Stand by assistance  Comment: Verbal cues in bed to reposition and safely engage  Transfers  Sit to stand: Stand by assistance  Stand to sit: Stand by assistance     Pt. preferred a sitting task for final 10 minutes of tx; pt. was able to place/remove \"Perfection\" puzzle pieces in board when therapist provided one piece at a time and mod verbal cues. Pt. returned to bed at end of tx. During OT tx, RN for cardiology assessed pt. but stated pt. was ok to continue tx. Assessment   Performance deficits / Impairments: Decreased functional mobility ; Decreased ADL status; Decreased safe awareness;Decreased balance;Decreased strength;Decreased endurance;Decreased high-level IADLs;Decreased fine motor control;Decreased coordination;Decreased cognition  Assessment: Pt. continues to be alert but her daughter reports decreased ability to word find. Pt. demos G standing tolerance and balance and G endurance overall. Pt. engaged well with OT tasks but continues to benefit to improve independence, balance and safety with ADLs. REQUIRES OT FOLLOW UP: Yes  Activity Tolerance  Activity Tolerance: Patient Tolerated treatment well  Activity Tolerance: Pt required min cues to maintain arousal throughout session.    Safety Devices  Safety Devices in place:

## 2018-06-28 NOTE — PROGRESS NOTES
Occupational Therapy  Facility/Department: [facility]  Daily Treatment Note  NAME: Roberto Haas  : 1933  MRN: [id number]    Date of Service: 2018    Discharge Recommendations:  Continue to assess pending progress       Patient Diagnosis(es): There were no encounter diagnoses. has a past medical history of HTN (hypertension) and Hypothyroidism. has no past surgical history on file. Restrictions  Restrictions/Precautions  Restrictions/Precautions: Modified Diet, Fall Risk  Position Activity Restriction  Other position/activity restrictions: Pacemaker precautions    Subjective   General  Chart Reviewed: Yes  Patient assessed for rehabilitation services?: Yes  Response to previous treatment: Patient with no complaints from previous session  Family / Caregiver Present: No  Referring Practitioner: Dr. Pineda Horner  Diagnosis: Right hemiparesis secondary to left cerebral infarct   Pre Treatment Pain Screening  Pain at present: 0  Scale Used: Numeric Score  Intervention List: Patient able to continue with treatment  Pain Assessment  Patient Currently in Pain: Denies  Pain Assessment: 0-10  Pain Level: 0  Vital Signs  Patient Currently in Pain: Denies     Orientation  Orientation  Orientation Level: Disoriented to person (Pt stated, \"rehab\". Pt unable to recall name of rehab. Pt uanble to recall month, year, or day of week. Pt unable to recall reason for being in rehab.)    Objective       Instrumental ADL's  Instrumental ADLs: Yes  Light Housekeeping  Light Housekeeping Level of Assistance: Stand by assistance  Light Housekeeping: Pt demo'd significant difficulty with sustained attention for task of folding laundry in standing. Pt required verbal and tactile cues to intiate folding each piece of laundry, then again assisted through activity. Pt folded a total of 13 towels/washclothes/pillowcases over a time span of 12 minutes. Pt tolerated standing for increments of 1-2 minutes.  Pt frequently self-initated rest breaks, but required verbal and tactile cues for correct hand placement on w/c during each sit <> stand. Pt was unable to count 1-13 without assistance. Standing Balance  Activity: Folding laundry - see IADL comments   Sit to stand: Stand by assistance  Stand to sit: Stand by assistance  Comment: With verbal and tactile cues for safety and sequencing  Functional Mobility  Functional - Mobility Device: Other (unilateral hand held assistance)  Activity: Transport items  Assist Level: Minimal assistance  Functional Mobility Comments: Pt demonstrated poor safety awareness with use of FWW and declined functional mobility without using unilateral hand-held assistance. With hand-held A, pt completed functional mobility while carrying a towel in R hand ~18ft to place towel into draw below waist height. Pt demo'd decreasd balance and poor safety awareness when pt attempted to \"kick\" drawer closed instead of pushing it closed with UE. Pt then completed functional mobility an additional 20 ft with L hand-held assistance. Wheelchair follow provided d/t decreased safety awareness and pt's frequent attempts to sit down when not cued/safe. Transfers  Sit to stand: Stand by assistance  Stand to sit: Stand by assistance                       Cognition  Overall Cognitive Status: Exceptions  Arousal/Alertness: Delayed responses to stimuli  Following Commands: Follows one step commands with repetition  Attention Span: Attends with cues to redirect; Difficulty attending to directions; Difficulty dividing attention  Memory: Decreased short term memory;Decreased long term memory;Decreased recall of precautions;Decreased recall of biographical Information;Decreased recall of recent events  Safety Judgement: Decreased awareness of need for assistance;Decreased awareness of need for safety  Problem Solving: Assistance required to generate solutions;Assistance required to implement solutions;Assistance required to identify errors made;Assistance required to correct errors made  Insights: Decreased awareness of deficits  Initiation: Requires cues for all  Cognition Comment: Pt required increased cues for sequencing and task initiation this date. Type of ROM/Therapeutic Exercise  Comment: Re-Educated pt on BUE HEP program within parameters of pacemaker precautions. Pt required a visual model, tactile cues, and assistance to count each rep of the exercise. Pt completed exercises seated EOM. Exercises  Shoulder Flexion: 10 reps x 2 to 90 degrees bilaterally d/t pacemaker precautions (pt's unable to cognitively problem solve maintaining L shoulder flexion to 90 only and full AROM of R UE)  Elbow Flexion: 10 reps x 2 BUEs   Elbow Extension: 10 reps x 2 BUEs   Supination: 10 reps x 2 BUEs   Pronation: 10 reps x 2 BUEs   Wrist Flexion: 10 reps x 2 BUEs   Wrist Extension: 10 reps x 2 BUEs   Other: Pt selina'd increased difficulty following exercises this date and required frequent tactile cues to initiate correct movement and a visual model to complete each rep. Increased edema noted on posterior of L hand. Decreased edema noted in L forearm and digits. Assessment   Performance deficits / Impairments: Decreased functional mobility ; Decreased ADL status; Decreased safe awareness;Decreased balance;Decreased strength;Decreased endurance;Decreased high-level IADLs;Decreased fine motor control;Decreased coordination;Decreased cognition  Assessment: Pt bisi decreased safety awareness and cognition and decreased dynamic standing balance/functional mobility which impacts her independence in daily living. Pt would benefit from continued skilled OT services to address noted impairments. REQUIRES OT FOLLOW UP: Yes  Activity Tolerance  Activity Tolerance: Patient limited by fatigue  Activity Tolerance: Pt required min cues to maintain arousal throughout session.    Safety Devices  Safety Devices in place: Yes  Type of devices: All fall risk precautions in place          Plan   Plan  Times per week:  minutes per day, 5-7 days per week  Plan weeks: 2-3 weeks   Current Treatment Recommendations: Strengthening, ROM, Balance Training, Functional Mobility Training, Endurance Training, Neuromuscular Re-education, Cognitive Reorientation, Safety Education & Training, Patient/Caregiver Education & Training, Equipment Evaluation, Education, & procurement, Self-Care / ADL, Home Management Training, Cognitive/Perceptual Training  Plan Comment: Continue per OT POC     Goals  Patient Goals   Patient goals : \"Take care of myself. \"        Therapy Time   Individual Concurrent Group Co-treatment   Time In 6716         Time Out 1000         Minutes [facility], OT Electronically signed by Haider Moreno OT on 6/28/2018 at 12:58 PM

## 2018-06-28 NOTE — PROGRESS NOTES
LEUKOCYTESUR in the last 72 hours.     Invalid input(s):  Gracie Villalobos,  SPECGRSAJAN,  NITRU     Assessment/Plan:  Likely her LOC was cardiac in origin  Currently not delirious, With slow improvement  We will continue to monitor her clinical progress  Can repeat CT head on  6/20 depending on patients hemodynamic stability  EEG done showed diffuse slowing  Continue supportive measures  Lab work showed elevated TSH, to watch  Vitamin D level was low we shall supplement  Anemia being washed her    On Plavix and aspirin   Patient is improving  Pt low level in fuction  Will benefit from rehab  Reid Sees from neuro    Jazmyn Harris MD

## 2018-06-28 NOTE — PROGRESS NOTES
Physical Therapy  Pt unavaiable to be treated at scheduled  Therapy time due to testing for possible DVT.  Will need negative results prior to treating pt

## 2018-06-29 LAB
ALBUMIN SERPL-MCNC: 2.9 G/DL (ref 3.9–4.9)
ALP BLD-CCNC: 59 U/L (ref 40–130)
ALT SERPL-CCNC: 13 U/L (ref 0–33)
ANION GAP SERPL CALCULATED.3IONS-SCNC: 12 MEQ/L (ref 7–13)
AST SERPL-CCNC: 22 U/L (ref 0–35)
BILIRUB SERPL-MCNC: 0.6 MG/DL (ref 0–1.2)
BUN BLDV-MCNC: 12 MG/DL (ref 8–23)
C-REACTIVE PROTEIN, HIGH SENSITIVITY: 4.2 MG/L (ref 0–5)
CALCIUM SERPL-MCNC: 8.9 MG/DL (ref 8.6–10.2)
CHLORIDE BLD-SCNC: 95 MEQ/L (ref 98–107)
CO2: 27 MEQ/L (ref 22–29)
CREAT SERPL-MCNC: 0.56 MG/DL (ref 0.5–0.9)
EKG ATRIAL RATE: 62 BPM
EKG P-R INTERVAL: 252 MS
EKG Q-T INTERVAL: 538 MS
EKG QRS DURATION: 150 MS
EKG QTC CALCULATION (BAZETT): 546 MS
EKG R AXIS: -16 DEGREES
EKG T AXIS: 97 DEGREES
EKG VENTRICULAR RATE: 62 BPM
GFR AFRICAN AMERICAN: >60
GFR NON-AFRICAN AMERICAN: >60
GLOBULIN: 4.2 G/DL (ref 2.3–3.5)
GLUCOSE BLD-MCNC: 88 MG/DL (ref 74–109)
HCT VFR BLD CALC: 30.9 % (ref 37–47)
HEMOGLOBIN: 10.4 G/DL (ref 12–16)
MAGNESIUM: 2 MG/DL (ref 1.7–2.3)
MCH RBC QN AUTO: 29.4 PG (ref 27–31.3)
MCHC RBC AUTO-ENTMCNC: 33.6 % (ref 33–37)
MCV RBC AUTO: 87.7 FL (ref 82–100)
ORGANISM: ABNORMAL
PDW BLD-RTO: 17.5 % (ref 11.5–14.5)
PLATELET # BLD: 393 K/UL (ref 130–400)
POTASSIUM SERPL-SCNC: 4.1 MEQ/L (ref 3.5–5.1)
RBC # BLD: 3.52 M/UL (ref 4.2–5.4)
SEDIMENTATION RATE, ERYTHROCYTE: 72 MM (ref 0–30)
SODIUM BLD-SCNC: 134 MEQ/L (ref 132–144)
TOTAL PROTEIN: 7.1 G/DL (ref 6.4–8.1)
URINE CULTURE, ROUTINE: ABNORMAL
URINE CULTURE, ROUTINE: ABNORMAL
WBC # BLD: 8.2 K/UL (ref 4.8–10.8)

## 2018-06-29 PROCEDURE — 93005 ELECTROCARDIOGRAM TRACING: CPT

## 2018-06-29 PROCEDURE — 97535 SELF CARE MNGMENT TRAINING: CPT

## 2018-06-29 PROCEDURE — 6370000000 HC RX 637 (ALT 250 FOR IP): Performed by: INTERNAL MEDICINE

## 2018-06-29 PROCEDURE — 1180000000 HC REHAB R&B

## 2018-06-29 PROCEDURE — 97127 HC SP THER IVNTJ W/FOCUS COG FUNCJ: CPT

## 2018-06-29 PROCEDURE — 85027 COMPLETE CBC AUTOMATED: CPT

## 2018-06-29 PROCEDURE — 97112 NEUROMUSCULAR REEDUCATION: CPT

## 2018-06-29 PROCEDURE — 97116 GAIT TRAINING THERAPY: CPT

## 2018-06-29 PROCEDURE — 97127 HC OT THER IVNTJ W/FOCUS COG FUNCJ: CPT

## 2018-06-29 PROCEDURE — 93010 ELECTROCARDIOGRAM REPORT: CPT | Performed by: INTERNAL MEDICINE

## 2018-06-29 PROCEDURE — 36415 COLL VENOUS BLD VENIPUNCTURE: CPT

## 2018-06-29 PROCEDURE — 80053 COMPREHEN METABOLIC PANEL: CPT

## 2018-06-29 PROCEDURE — 99232 SBSQ HOSP IP/OBS MODERATE 35: CPT | Performed by: PHYSICAL MEDICINE & REHABILITATION

## 2018-06-29 PROCEDURE — 86141 C-REACTIVE PROTEIN HS: CPT

## 2018-06-29 PROCEDURE — 83735 ASSAY OF MAGNESIUM: CPT

## 2018-06-29 PROCEDURE — 97110 THERAPEUTIC EXERCISES: CPT

## 2018-06-29 PROCEDURE — 6370000000 HC RX 637 (ALT 250 FOR IP): Performed by: PHYSICAL MEDICINE & REHABILITATION

## 2018-06-29 PROCEDURE — 85652 RBC SED RATE AUTOMATED: CPT

## 2018-06-29 PROCEDURE — 97530 THERAPEUTIC ACTIVITIES: CPT

## 2018-06-29 RX ADMIN — MODAFINIL 100 MG: 100 TABLET ORAL at 12:15

## 2018-06-29 RX ADMIN — METOPROLOL SUCCINATE 25 MG: 25 TABLET, EXTENDED RELEASE ORAL at 08:56

## 2018-06-29 RX ADMIN — HYDRALAZINE HYDROCHLORIDE 100 MG: 50 TABLET, FILM COATED ORAL at 21:53

## 2018-06-29 RX ADMIN — HYDROCHLOROTHIAZIDE 25 MG: 25 TABLET ORAL at 08:56

## 2018-06-29 RX ADMIN — Medication 1 DROP: at 21:52

## 2018-06-29 RX ADMIN — DOCUSATE SODIUM 100 MG: 100 CAPSULE, LIQUID FILLED ORAL at 08:56

## 2018-06-29 RX ADMIN — ASPIRIN 81 MG 81 MG: 81 TABLET ORAL at 08:56

## 2018-06-29 RX ADMIN — Medication 5000 UNITS: at 08:57

## 2018-06-29 RX ADMIN — LOSARTAN POTASSIUM 50 MG: 50 TABLET, FILM COATED ORAL at 08:56

## 2018-06-29 RX ADMIN — DOCUSATE SODIUM 100 MG: 100 CAPSULE, LIQUID FILLED ORAL at 21:53

## 2018-06-29 RX ADMIN — LEVOTHYROXINE SODIUM 25 MCG: 25 TABLET ORAL at 05:52

## 2018-06-29 RX ADMIN — Medication: at 05:52

## 2018-06-29 RX ADMIN — Medication: at 21:54

## 2018-06-29 RX ADMIN — SIMVASTATIN 20 MG: 20 TABLET, FILM COATED ORAL at 21:54

## 2018-06-29 RX ADMIN — AMLODIPINE BESYLATE 10 MG: 10 TABLET ORAL at 08:56

## 2018-06-29 RX ADMIN — AMOXICILLIN AND CLAVULANATE POTASSIUM 1 TABLET: 875; 125 TABLET, FILM COATED ORAL at 21:52

## 2018-06-29 RX ADMIN — HYDRALAZINE HYDROCHLORIDE 100 MG: 50 TABLET, FILM COATED ORAL at 14:14

## 2018-06-29 RX ADMIN — MODAFINIL 100 MG: 100 TABLET ORAL at 08:56

## 2018-06-29 RX ADMIN — HYDRALAZINE HYDROCHLORIDE 100 MG: 50 TABLET, FILM COATED ORAL at 05:52

## 2018-06-29 RX ADMIN — AMOXICILLIN AND CLAVULANATE POTASSIUM 1 TABLET: 875; 125 TABLET, FILM COATED ORAL at 08:56

## 2018-06-29 RX ADMIN — CLOPIDOGREL BISULFATE 75 MG: 75 TABLET, FILM COATED ORAL at 08:56

## 2018-06-29 RX ADMIN — Medication: at 14:14

## 2018-06-29 ASSESSMENT — PAIN SCALES - GENERAL
PAINLEVEL_OUTOF10: 0
PAINLEVEL_OUTOF10: 0

## 2018-06-29 NOTE — PROGRESS NOTES
Occupational Therapy  [facility]/Department: [facility]  Daily Treatment Note  NAME: Ynuior Avila  : 1933  MRN: [id number]    Date of Service: 2018    Discharge Recommendations:  Continue to assess pending progress       Patient Diagnosis(es): There were no encounter diagnoses. has a past medical history of HTN (hypertension) and Hypothyroidism. has no past surgical history on file. Restrictions  Restrictions/Precautions  Restrictions/Precautions: Modified Diet, Fall Risk  Position Activity Restriction  Other position/activity restrictions: Pacemaker precautions  Subjective   General  Chart Reviewed: Yes  Patient assessed for rehabilitation services?: Yes  Response to previous treatment: Patient with no complaints from previous session  Family / Caregiver Present: No  Referring Practitioner: Dr. Shima Marie  Diagnosis: Right hemiparesis secondary to left cerebral infarct   Pre Treatment Pain Screening  Pain at present: 0  Scale Used: Numeric Score  Intervention List: Patient able to continue with treatment  Pain Assessment  Patient Currently in Pain: Denies  Pain Assessment: 0-10  Pain Level: 0  Vital Signs  Patient Currently in Pain: Denies   Orientation     Objective      Pt's daughter present in PM and was tearful as she informed therapist that pt. continues to show a decline in her verbal and cognitive skills. Therapist educated her on the fact that strokes can have back slides in between advances in progress. She reported understanding but reported feeling very stressed and afraid. Therapist advised her to go home and take a break in order to process and feel more able to cope with pt's change of status. She stated she had set up pt. with her lunch and assisted with scooping food but made sure pt. fed herself; therapist reinforced that this is a good practice for the patient and that pt. will benefit from being encouraged to maintain independence with any and all ADLs that she can.  Pt's daughter stated she would come back for dinner and help pt. eat then as well. ADL  Toileting: Dependent/Total  Additional Comments: Therapist asked pt. if she needed to toilet prior to going down to OT. Per pt's daughter she will always answer \"no\". Therapist took pt to bathroom and pt. was noted to be incontinent of bowel. After sitting on commode pt. had another loose but continent bowel movement. RN notified of pt's loose stool. Toilet Transfers  Toilet - Technique: Stand step  Equipment Used: Grab bars  Toilet Transfer: Minimal assistance  Toilet Transfers Comments: Pt. required assistance to transfer to commode from w/c with mod verbal cues and tactile cues to initiate movement as well as min A to begin sit-stand and stand-sit      Cognition  Overall Cognitive Status: Exceptions  Arousal/Alertness: Delayed responses to stimuli  Following Commands: Follows one step commands with repetition  Attention Span: Difficulty attending to directions; Difficulty dividing attention  Memory: Decreased short term memory;Decreased long term memory;Decreased recall of precautions;Decreased recall of biographical Information;Decreased recall of recent events  Safety Judgement: Decreased awareness of need for assistance;Decreased awareness of need for safety  Problem Solving: Assistance required to generate solutions;Assistance required to implement solutions;Assistance required to identify errors made;Assistance required to correct errors made  Insights: Decreased awareness of deficits  Initiation: Requires cues for all  Sequencing: Requires cues for all  Cognition Comment: Pt. engaged in cognitive activity with months of the year, sequencing all 12 months. When provided with two choices pt. was 10% accurate, but when asked \"What comes after (blank)\" Pt. was 75% accurate. Pt. required extended time to process. Pt. was also given a holiday \"what month is labor day\" \"what month is Gypsy. \" Pt. was 50% accurate for 10

## 2018-06-29 NOTE — PROGRESS NOTES
[facility]  Speech Language/Pathology  Speech Language Therapy Note              Kike Amaro  6/29/2018    Medical Dx: Right hemiparesis ([address] [G81.91]  Abnormality of gait and mobility [R26.9]      ST Dx: [] Aphasia  [] Dysarthria  [] Apraxia   [] Oropharyngeal dysphagia              [x] Cognitive Impairment  [] Other:       Time in: 1102  Time out: 1132    Subjective:  Behavior: [] Alert  [] Cooperative  [x]  Pleasant  [x] Confused  [] Agitated                                          [] Uncooperative  [] Distractible [] Motivated  [] Self-Limiting [] Anxious  [] Other:    Endurance:  [] Adequate for participation in SLP sessions  [x] Reduced overall              [] Lethargic  [] Other:          Interventions used this date:  [] Speech Treatment       [x] Expressive Language  [x] Receptive Language   [] Dysphagia Treatment   [x] Cognitive Skill Lester  [] Oral Motor  [] Voice Treatment       []  AAC     [] ESTIM  [] Speech production       [] Therapeutic Meal Monitor  [] Instruction in compensatory strategies       Objective/Assessment:    Goal 3: Pt will sustain attention to tasks for 10 minutes with occasional redirection to promote pt's ability to participate in function therapeutic activities. Pt participated in a sustained attention task for 8 minutes with good attention to ask. Pt appeared confused to task directions despite maximal cues and modeling from SLP. When asked if pt needed help, pt denied needing assistance. Pt's orientation targeted this session. Pt disoriented to time, place, and biographical information. Pt appeared confused this date. When asked Wh- questions, pt provided no response, said \"I don't know,\" or gave a confused facial expression. Pt observed to verbally imitate SLP's speech when given a verbal model. Pt presented with limited verbal productions during the tx session. Pt answered basic yes/no questions with 62% accuracy independently.  Pt completed

## 2018-06-29 NOTE — PROGRESS NOTES
Occupational Therapy  Facility/Department: [facility]  Daily Treatment Note  NAME: Melvin Herrmann  : 1933  MRN: [id number]    Date of Service: 2018    Discharge Recommendations:  Continue to assess pending progress       Patient Diagnosis(es): There were no encounter diagnoses. has a past medical history of HTN (hypertension) and Hypothyroidism. has no past surgical history on file. Restrictions  Restrictions/Precautions  Restrictions/Precautions: Modified Diet, Fall Risk  Position Activity Restriction  Other position/activity restrictions: Pacemaker precautions  Subjective   General  Chart Reviewed: Yes  Patient assessed for rehabilitation services?: Yes  Response to previous treatment: Patient with no complaints from previous session  Family / Caregiver Present: No  Referring Practitioner: Dr. Yanet Parisi  Diagnosis: Right hemiparesis secondary to left cerebral infarct   Pre Treatment Pain Screening  Pain at present: 0  Scale Used: Numeric Score  Intervention List: Patient able to continue with treatment  Pain Assessment  Patient Currently in Pain: Denies  Pain Assessment: 0-10  Pain Level: 0  Vital Signs  Patient Currently in Pain: Denies   Orientation     Objective    Pt. engaged in repetitive reaching task to improve direction following, UE endurance, coordination and sequencing. Pt. was able to use R hand to remove 50 marbles from board with min verbal cues not to stop part way or not complete a row. Pt. required 2 tactile cues to restart the task. Pt. also required min verbal cues and mod tactile cues to remove 100 large pegs with L hand, due to pt. stopping frequently and requiring assist to begin again. Pt. was unable to follow verbal directions initially until tactile [facility]J [facility] assist given to start. Cognition  Overall Cognitive Status: Exceptions  Arousal/Alertness: Delayed responses to stimuli  Following Commands:  Follows one step commands with repetition  Attention Span: Difficulty attending to directions; Difficulty dividing attention  Memory: Decreased short term memory;Decreased long term memory;Decreased recall of precautions;Decreased recall of biographical Information;Decreased recall of recent events  Safety Judgement: Decreased awareness of need for assistance;Decreased awareness of need for safety  Problem Solving: Assistance required to generate solutions;Assistance required to implement solutions;Assistance required to identify errors made;Assistance required to correct errors made  Insights: Decreased awareness of deficits  Initiation: Requires cues for all  Sequencing: Requires cues for all  Cognition Comment: Pt. engaged in cognitive activity to improve scanning and problem solving. Pt. provided with 2-3 1 piece shape matching foam puzzles at a time. Pt. was 25% accurate placing puzzle pieces but required verbal cues the remaining 75% of the time. Pt. attempted to match red to red or blue to blue despite shape and did not notice or attempt to correct errors. Pt. had max difficulty sequencing throughout this task. When offered only 1 choice pt. was 75% accurate. Pt. did not attempt to rotate pieces without cues to make sure they fit. Assessment   Performance deficits / Impairments: Decreased functional mobility ; Decreased ADL status; Decreased safe awareness;Decreased balance;Decreased strength;Decreased endurance;Decreased high-level IADLs;Decreased fine motor control;Decreased coordination;Decreased cognition  Assessment: Pt. demonstrated poor safety awareness and decreased sequencing. Pt. had max difficulty with all tasks this AM and required step by step and occasional tactile assist to safely perform all activities in OT. Pt. continues to benefit from skilled OT to improve safety, sequencing and balance for ADLs and IADLs.    REQUIRES OT FOLLOW UP: Yes  Activity Tolerance  Activity Tolerance: Patient Tolerated treatment well  Safety Devices  Safety Devices in place: Yes  Type of devices: All fall risk precautions in place          Plan   Plan  Times per week:  minutes per day, 5-7 days per week  Plan weeks: 2-3 weeks   Current Treatment Recommendations: Strengthening, ROM, Balance Training, Functional Mobility Training, Endurance Training, Neuromuscular Re-education, Cognitive Reorientation, Safety Education & Training, Patient/Caregiver Education & Training, Equipment Evaluation, Education, & procurement, Self-Care / ADL, Home Management Training, Cognitive/Perceptual Training  Plan Comment: Continue per OT POC   G-Code     OutComes Score  AM-PAC Score  Goals  Patient Goals   Patient goals : \"Take care of myself. \"        Therapy Time   Individual Concurrent Group Co-treatment   Time In 1130         Time Out 1200         Minutes [address] MERY Morales/L Electronically signed by MERY Mcnamara/L on 6/29/2018 at 2:58 PM

## 2018-06-29 NOTE — PROGRESS NOTES
;Decreased ADL status; Decreased safe awareness;Decreased balance;Decreased strength;Decreased endurance;Decreased high-level IADLs;Decreased fine motor control;Decreased coordination;Decreased cognition  Assessment: Pt demo'd poor safety awareness throughout session and required max cues for safety and problem solving. Pt would continue to benefit from skilled OT services focusing on standing balance and cognitive tasks related to ADLs/IADLs. REQUIRES OT FOLLOW UP: Yes          Plan   Plan  Times per week:  minutes per day, 5-7 days per week  Plan weeks: 2-3 weeks   Current Treatment Recommendations: Strengthening, ROM, Balance Training, Functional Mobility Training, Endurance Training, Neuromuscular Re-education, Cognitive Reorientation, Safety Education & Training, Patient/Caregiver Education & Training, Equipment Evaluation, Education, & procurement, Self-Care / ADL, Home Management Training, Cognitive/Perceptual Training  Plan Comment: Continue per OT POC     Goals  Patient Goals   Patient goals : \"Take care of myself. \"        Therapy Time   Individual Concurrent Group Co-treatment   Time In 0932         Time Out 1977         Minutes 2129 Ivan Domingo OT Electronically signed by Juan Delong OT on 6/29/2018 at 12:16 PM

## 2018-06-29 NOTE — PROGRESS NOTES
Physical Therapy Rehab Treatment Note  Facility/Department: [facility]  Room: R251/R251-01       NAME: Samantha Rodriguez  : 1933 (80 y.o.)  MRN: [id number]  CODE STATUS: Limited    Date of Service: 2018  Chart Reviewed: Yes  Family / Caregiver Present: No  General Comment  Comments: Pt with worsening expressive aphasia. Also receptive aphasic at times. Restrictions:  Restrictions/Precautions: Modified Diet, Fall Risk  Position Activity Restriction  Other position/activity restrictions: Pacemaker precautions       SUBJECTIVE: Response To Previous Treatment: Patient with no complaints from previous session. Pain Screening  Patient Currently in Pain: Denies  Pre Treatment Pain Screening  Pain at present: 0  Scale Used: Numeric Score  Intervention List: Patient able to continue with treatment    Post Treatment Pain Screenin/10       OBJECTIVE:   Orientation Level: Disoriented to place; Disoriented to time;Disoriented to situation             Bed mobility  Pt refusing    Transfers  Pt refusing    Ambulation  Pt refusing         Exercises  Hip Flexion: 2 x 10  Knee Long Arc Quad: 2 x 10  Ankle Pumps: 10     ASSESSMENT/COMMENTS: Pt returned to room early secondary to lack of participation. Pt c/o being tired several times, but declined to return to bed. PLAN OF CARE/Safety:   Safety Devices  Type of devices: All fall risk precautions in place;Gait belt; Chair alarm in place      Therapy Time:   Individual   Time In 1500   Time Out 1520   Minutes 20     Minutes:20      Transfer/Bed mobility training: 10      Gait training:      Neuro re education:     Therapeutic ex: FIONA Kaur, 18 at 3:21 PM

## 2018-06-30 PROCEDURE — 6370000000 HC RX 637 (ALT 250 FOR IP): Performed by: INTERNAL MEDICINE

## 2018-06-30 PROCEDURE — 97110 THERAPEUTIC EXERCISES: CPT | Performed by: INTERNAL MEDICINE

## 2018-06-30 PROCEDURE — 97116 GAIT TRAINING THERAPY: CPT

## 2018-06-30 PROCEDURE — 6370000000 HC RX 637 (ALT 250 FOR IP): Performed by: PHYSICAL MEDICINE & REHABILITATION

## 2018-06-30 PROCEDURE — 99232 SBSQ HOSP IP/OBS MODERATE 35: CPT | Performed by: PHYSICAL MEDICINE & REHABILITATION

## 2018-06-30 PROCEDURE — 97127 HC OT THER IVNTJ W/FOCUS COG FUNCJ: CPT

## 2018-06-30 PROCEDURE — 97530 THERAPEUTIC ACTIVITIES: CPT

## 2018-06-30 PROCEDURE — 97112 NEUROMUSCULAR REEDUCATION: CPT

## 2018-06-30 PROCEDURE — 1180000000 HC REHAB R&B

## 2018-06-30 PROCEDURE — 97535 SELF CARE MNGMENT TRAINING: CPT

## 2018-06-30 RX ORDER — MEGESTROL ACETATE 40 MG/ML
200 SUSPENSION ORAL 2 TIMES DAILY WITH MEALS
Status: DISCONTINUED | OUTPATIENT
Start: 2018-06-30 | End: 2018-07-05

## 2018-06-30 RX ADMIN — Medication: at 05:45

## 2018-06-30 RX ADMIN — HYDROCHLOROTHIAZIDE 25 MG: 25 TABLET ORAL at 10:08

## 2018-06-30 RX ADMIN — CLOPIDOGREL BISULFATE 75 MG: 75 TABLET, FILM COATED ORAL at 10:08

## 2018-06-30 RX ADMIN — MEGESTROL ACETATE 200 MG: 40 SUSPENSION ORAL at 17:26

## 2018-06-30 RX ADMIN — Medication: at 13:21

## 2018-06-30 RX ADMIN — DOCUSATE SODIUM 100 MG: 100 CAPSULE, LIQUID FILLED ORAL at 10:09

## 2018-06-30 RX ADMIN — Medication: at 21:53

## 2018-06-30 RX ADMIN — AMOXICILLIN AND CLAVULANATE POTASSIUM 1 TABLET: 875; 125 TABLET, FILM COATED ORAL at 10:09

## 2018-06-30 RX ADMIN — LOSARTAN POTASSIUM 50 MG: 50 TABLET, FILM COATED ORAL at 10:08

## 2018-06-30 RX ADMIN — SIMVASTATIN 20 MG: 20 TABLET, FILM COATED ORAL at 21:53

## 2018-06-30 RX ADMIN — LEVOTHYROXINE SODIUM 25 MCG: 25 TABLET ORAL at 05:44

## 2018-06-30 RX ADMIN — Medication 1 DROP: at 21:49

## 2018-06-30 RX ADMIN — MEGESTROL ACETATE 200 MG: 40 SUSPENSION ORAL at 10:14

## 2018-06-30 RX ADMIN — ASPIRIN 81 MG 81 MG: 81 TABLET ORAL at 10:07

## 2018-06-30 RX ADMIN — METOPROLOL SUCCINATE 25 MG: 25 TABLET, EXTENDED RELEASE ORAL at 10:09

## 2018-06-30 RX ADMIN — HYDRALAZINE HYDROCHLORIDE 100 MG: 50 TABLET, FILM COATED ORAL at 13:20

## 2018-06-30 RX ADMIN — Medication 5000 UNITS: at 10:09

## 2018-06-30 RX ADMIN — DOCUSATE SODIUM 100 MG: 100 CAPSULE, LIQUID FILLED ORAL at 21:52

## 2018-06-30 RX ADMIN — AMLODIPINE BESYLATE 10 MG: 10 TABLET ORAL at 10:08

## 2018-06-30 RX ADMIN — MODAFINIL 100 MG: 100 TABLET ORAL at 12:24

## 2018-06-30 RX ADMIN — MODAFINIL 100 MG: 100 TABLET ORAL at 10:08

## 2018-06-30 RX ADMIN — HYDRALAZINE HYDROCHLORIDE 100 MG: 50 TABLET, FILM COATED ORAL at 05:44

## 2018-06-30 RX ADMIN — HYDRALAZINE HYDROCHLORIDE 100 MG: 50 TABLET, FILM COATED ORAL at 21:52

## 2018-06-30 ASSESSMENT — PAIN SCALES - GENERAL
PAINLEVEL_OUTOF10: 0

## 2018-06-30 NOTE — PROGRESS NOTES
am.     Exercises  Knee Long Arc Quad: x 10. needs redirection to continue task. Comments: gentle rom and stretches to esthela lower ext in longseated position     ASSESSMENT/COMMENTS:  Assessment: pt agreeable to ambulation but was very impulsive with rollator and with transfers. physical assist needed for safety to control ad. pt talked often about a phone call telling her that her breakfast was ready. She repeated this 3 times during session.   Asked to go back to bed often but was able to stay entire session this am.     PLAN OF CARE/Safety:          Therapy Time:   Individual   Time In 1030   Time Out 1100   Minutes 30     Minutes:30      Transfer/Bed mobility training:10      Gait training:10      Neuro re education:10     Therapeutic ex:      Suresh Dias PTA, 06/30/18 at 10:51 AM

## 2018-06-30 NOTE — PROGRESS NOTES
Time:   Individual   Time In 1500   Time Out 1530   Minutes 30     Minutes:  30      Transfer/Bed mobility training:      Gait training:      Neuro re education:     Therapeutic ex: [id number] [facility], 06/30/18 at 5:07 PM

## 2018-06-30 NOTE — PROGRESS NOTES
therapy. Patient and family education is in progress. The patient is to follow-up with their family physician after discharge. Complex Active General Medical Issues that complicate care Assess & Plan:        1.  acute likely aspiration related Pneumonia of both lungs due to infectious organism-patient is now on Augmentin every 12 hours for 5 days first dose was June 25, 2018, prevent  aspiration  -feeding only with assist up in chair with modified diet and consult speech and language pathology-she is on Augmentin  2. Severe protein-calorie malnutrition-dietary consult add nutritional supplementation protein and vitamin B-12  3. Complete heart block history with recent pacemaker placement,  HTN (hypertension), MI (myocardial infarction), History of PTCA with stents-vital signs checks every shift, monitor for chest pain, dose and titrate Norvasc, Apresoline, Plavix, hydrogen rail, Cozaar, aspirin, Toprol-XL, Zocor-consult hospitals for backup medical-cardiology will come in to check pacer. 4.   Anemia-recheck CBC and BMP  5. [place] (hard of hearing)-speech therapy to provide listening devices as tolerated  6. Hypothyroidism-Synthroid 25 µg daily  7. Right hemiparesis, Aphasia, Dysphagia, oropharyngeal phase-consult neurology monitor neurologically  8. Incontinent of urine and stool-scheduled toileting rule out UTI was stat UA catheterize special then  9. Severe skin tears bilateral upper extremities -sacral redness at side to side turns in a crate mattress as well as E T-max. 10. Severe thrush- increase cinnamon oil  11. Oropharyngeal dysphasia-dysphasia to mechanically altered foods no drinking straws consults recently which pathology  12.  Severe thrush-cinnamon oil 4 times a day             Raul Ba D.O., PM&R     Attending    [address]

## 2018-06-30 NOTE — PROGRESS NOTES
testable  Like to be in a fetal position      Medications:    amLODIPine  10 mg Oral Daily    levothyroxine  25 mcg Oral Daily    hydrALAZINE  100 mg Oral 3 times per day    clopidogrel  75 mg Oral Daily    aspirin  81 mg Oral Daily    sodium chloride  250 mL Intravenous Once    losartan  50 mg Oral Daily    metoprolol succinate  25 mg Oral Daily    hydrochlorothiazide  25 mg Oral Daily    vitamin D  5,000 Units Oral Daily    piperacillin-tazobactam  3.375 g Intravenous Q8H    sodium chloride flush  10 mL Intravenous 2 times per day    azithromycin  500 mg Intravenous Q24H    sodium chloride  500 mL Intra-arterial Once    docusate sodium  100 mg Oral BID    simvastatin  20 mg Oral Nightly     Continuous Infusions:    PRN Meds:sodium chloride flush, oxyCODONE-acetaminophen, sodium chloride flush, hydrALAZINE, acetaminophen, magnesium hydroxide, ondansetron    LABS  CBC:   Recent Labs      06/21/18   2307  06/22/18   0512  06/24/18   0502   WBC  7.5  8.2  10.6   HGB  11.0*  10.8*  10.4*   PLT  237  243  303     BMP:    Recent Labs      06/22/18   0512  06/23/18   0458  06/24/18   0502   NA  132  132  132   K  4.3  4.2  5.1   CL  101  98  94*   CO2  20*  19*  24   BUN  21  16  21   CREATININE  0.63  0.62  0.74   GLUCOSE  88  105  118*     TSH:    No results for input(s): TSH in the last 72 hours. B12:    No results for input(s): Timi Boley in the last 72 hours. Vit. D:   No results for input(s): VITD25 in the last 72 hours. Lipids: No results found for: CHOL  No results found for: TRIG  No results found for: HDL  No results found for: LDLCHOLESTEROL, LDLCALC  No results found for: LABVLDL, VLDL  No results found for: CHOLHDLRATIO    Ammonia:No results for input(s): AMMONIA in the last 72 hours.   LFT:   Recent Labs      06/24/18   0502   AST  20   ALT  9   BILITOT  0.5   ALKPHOS  74        Urine:   No results for input(s): Beatrice Monday, GLUCOSEU, BLOODU, PHUR, PROTEINU, UROBILINOGEN,

## 2018-06-30 NOTE — PROGRESS NOTES
Occupational Therapy  Facility/Department: [facility]  Daily Treatment Note  NAME: Jah Decker  : 1933  MRN: [id number]    Date of Service: 2018    Discharge Recommendations:  Continue to assess pending progress       Patient Diagnosis(es): There were no encounter diagnoses. has a past medical history of HTN (hypertension) and Hypothyroidism. has no past surgical history on file. Restrictions  Restrictions/Precautions  Restrictions/Precautions: Modified Diet, Fall Risk  Position Activity Restriction  Other position/activity restrictions: Pacemaker precautions     Subjective \"Wheres my bed? \"   General  Chart Reviewed: Yes  Patient assessed for rehabilitation services?: Yes  Response to previous treatment: Patient with no complaints from previous session  Family / Caregiver Present: No  Referring Practitioner: Dr. 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 Pt then donned plastic rings onto pegs. Pt stood with F- balance for a maximum of 8 minutes and 12 seconds before requiring a seated rest break. Pt had to be set up with each indifidual peg every time in order to follow directions. Pt unable to comprehend doffing rings and removing pegs despite Max instructions and cues. Standing Balance  Sit to stand: Supervision  Stand to sit: Supervision (cues to reach back for chair before attempting to sit )     Transfers  Sit to stand: Supervision  Stand to sit: Supervision (cues to reach back for chair before attempting to sit )        Coordination  Fine Motor: Opening/Closing containers: Pt opened various ADL containers such as a toothpaste tube and a bottle of lotion. Pt was unable to open medicine bottles that required pressure on the cap. Educated pt in proper way to open medicine containers, however, pt was still unable to open them. Cognition  Overall Cognitive Status: Exceptions  Arousal/Alertness: Delayed responses to stimuli  Following Commands: Follows one step commands with repetition  Attention Span: Difficulty attending to directions; Difficulty dividing attention  Safety Judgement: Decreased awareness of need for assistance;Decreased awareness of need for safety  Problem Solving: Assistance required to generate solutions;Assistance required to implement solutions;Assistance required to identify errors made;Assistance required to correct errors made  Insights: Decreased awareness of deficits  Initiation: Requires cues for all  Sequencing: Requires cues for all  Cognition Comment: Pt presented with 4 seperate trials to count money and was unable to count any successfully. Pt was, however, able to successfully answer questions about money values such as \"how much money is a dime? \"    Sequencing Cards: Presented pt with cards depicting simple activities of everyday life such as washing the dog or doing laundry. Each activity had 4 steps to be sequenced. Pt successfully sequenced 1/4 trials with Max assistance and cuing. Assessment   Activity Tolerance  Activity Tolerance: Treatment limited secondary to decreased cognition  Activity Tolerance: Pt required frequent cues for attention to task and encouragement. Safety Devices  Safety Devices in place: Yes  Type of devices:  All fall risk precautions in place          Plan   Plan  Times per week:  minutes per day, 5-7 days per week  Plan weeks: 2-3 weeks   Current Treatment Recommendations: Strengthening, ROM, Balance Training, Functional Mobility Training, Endurance Training, Neuromuscular Re-education, Cognitive Reorientation, Safety Education & Training, Patient/Caregiver Education & Training, Equipment

## 2018-06-30 NOTE — PROGRESS NOTES
Severe redness noted around pt's mahsa areas. RN notified of incontinence as well as condition of skin. Standing Balance  Sit to stand: Supervision  Stand to sit: Supervision     Transfers  Sit to stand: Supervision  Stand to sit: Supervision     Cognition  Overall Cognitive Status: Exceptions  Arousal/Alertness: Delayed responses to stimuli  Following Commands: Follows one step commands with repetition  Attention Span: Difficulty attending to directions; Difficulty dividing attention  Safety Judgement: Decreased awareness of need for assistance;Decreased awareness of need for safety  Problem Solving: Assistance required to generate solutions;Assistance required to implement solutions;Assistance required to identify errors made;Assistance required to correct errors made  Insights: Decreased awareness of deficits  Initiation: Requires cues for all  Sequencing: Requires cues for all     Assessment   Activity Tolerance  Activity Tolerance: Treatment limited secondary to decreased cognition  Activity Tolerance: Pt required frequent cues for attention to task and encouragement. Safety Devices  Safety Devices in place: Yes  Type of devices: All fall risk precautions in place          Plan   Plan  Times per week:  minutes per day, 5-7 days per week  Plan weeks: 2-3 weeks   Current Treatment Recommendations: Strengthening, ROM, Balance Training, Functional Mobility Training, Endurance Training, Neuromuscular Re-education, Cognitive Reorientation, Safety Education & Training, Patient/Caregiver Education & Training, Equipment Evaluation, Education, & procurement, Self-Care / ADL, Home Management Training, Cognitive/Perceptual Training  Plan Comment: Continue per OT POC     Goals  Patient Goals   Patient goals : \"Take care of myself. \"        Therapy Time   Individual Concurrent Group Co-treatment   Time In 1330         Time Out 1430         Minutes 60           Electronically signed by ALLISON Zapata on

## 2018-07-01 PROCEDURE — 6370000000 HC RX 637 (ALT 250 FOR IP): Performed by: PHYSICAL MEDICINE & REHABILITATION

## 2018-07-01 PROCEDURE — 6370000000 HC RX 637 (ALT 250 FOR IP): Performed by: INTERNAL MEDICINE

## 2018-07-01 PROCEDURE — 99232 SBSQ HOSP IP/OBS MODERATE 35: CPT | Performed by: PHYSICAL MEDICINE & REHABILITATION

## 2018-07-01 PROCEDURE — 6360000002 HC RX W HCPCS: Performed by: PHYSICAL MEDICINE & REHABILITATION

## 2018-07-01 PROCEDURE — 1180000000 HC REHAB R&B

## 2018-07-01 RX ORDER — KETOCONAZOLE 20 MG/ML
SHAMPOO TOPICAL DAILY
Status: DISCONTINUED | OUTPATIENT
Start: 2018-07-01 | End: 2018-07-15 | Stop reason: HOSPADM

## 2018-07-01 RX ORDER — FLUCONAZOLE 100 MG/1
100 TABLET ORAL DAILY
Status: COMPLETED | OUTPATIENT
Start: 2018-07-01 | End: 2018-07-03

## 2018-07-01 RX ORDER — UBIDECARENONE 100 MG
100 CAPSULE ORAL
Status: DISCONTINUED | OUTPATIENT
Start: 2018-07-02 | End: 2018-07-15 | Stop reason: HOSPADM

## 2018-07-01 RX ORDER — ERGOCALCIFEROL 1.25 MG/1
50000 CAPSULE ORAL DAILY
Status: COMPLETED | OUTPATIENT
Start: 2018-07-01 | End: 2018-07-03

## 2018-07-01 RX ORDER — CYANOCOBALAMIN 1000 UG/ML
1000 INJECTION INTRAMUSCULAR; SUBCUTANEOUS WEEKLY
Status: DISCONTINUED | OUTPATIENT
Start: 2018-07-01 | End: 2018-07-15 | Stop reason: HOSPADM

## 2018-07-01 RX ADMIN — MODAFINIL 100 MG: 100 TABLET ORAL at 09:18

## 2018-07-01 RX ADMIN — SIMVASTATIN 20 MG: 20 TABLET, FILM COATED ORAL at 21:16

## 2018-07-01 RX ADMIN — Medication 5000 UNITS: at 09:19

## 2018-07-01 RX ADMIN — METOPROLOL SUCCINATE 25 MG: 25 TABLET, EXTENDED RELEASE ORAL at 09:19

## 2018-07-01 RX ADMIN — MODAFINIL 100 MG: 100 TABLET ORAL at 14:48

## 2018-07-01 RX ADMIN — LEVOTHYROXINE SODIUM 25 MCG: 25 TABLET ORAL at 06:19

## 2018-07-01 RX ADMIN — DOCUSATE SODIUM 100 MG: 100 CAPSULE, LIQUID FILLED ORAL at 21:15

## 2018-07-01 RX ADMIN — ERGOCALCIFEROL 50000 UNITS: 1.25 CAPSULE ORAL at 14:48

## 2018-07-01 RX ADMIN — HYDROCHLOROTHIAZIDE 25 MG: 25 TABLET ORAL at 09:19

## 2018-07-01 RX ADMIN — LOSARTAN POTASSIUM 50 MG: 50 TABLET, FILM COATED ORAL at 09:19

## 2018-07-01 RX ADMIN — Medication: at 21:16

## 2018-07-01 RX ADMIN — MEGESTROL ACETATE 200 MG: 40 SUSPENSION ORAL at 16:24

## 2018-07-01 RX ADMIN — MEGESTROL ACETATE 200 MG: 40 SUSPENSION ORAL at 09:19

## 2018-07-01 RX ADMIN — Medication: at 14:51

## 2018-07-01 RX ADMIN — HYDRALAZINE HYDROCHLORIDE 100 MG: 50 TABLET, FILM COATED ORAL at 14:48

## 2018-07-01 RX ADMIN — Medication 1 DROP: at 21:11

## 2018-07-01 RX ADMIN — DOCUSATE SODIUM 100 MG: 100 CAPSULE, LIQUID FILLED ORAL at 09:18

## 2018-07-01 RX ADMIN — Medication 1 DROP: at 09:19

## 2018-07-01 RX ADMIN — HYDRALAZINE HYDROCHLORIDE 100 MG: 50 TABLET, FILM COATED ORAL at 21:15

## 2018-07-01 RX ADMIN — HYDRALAZINE HYDROCHLORIDE 100 MG: 50 TABLET, FILM COATED ORAL at 06:19

## 2018-07-01 RX ADMIN — FLUCONAZOLE 100 MG: 100 TABLET ORAL at 14:48

## 2018-07-01 RX ADMIN — Medication: at 06:20

## 2018-07-01 RX ADMIN — CYANOCOBALAMIN 1000 MCG: 1000 INJECTION, SOLUTION INTRAMUSCULAR; SUBCUTANEOUS at 14:48

## 2018-07-01 RX ADMIN — Medication 1 DROP: at 14:51

## 2018-07-01 RX ADMIN — ASPIRIN 81 MG 81 MG: 81 TABLET ORAL at 09:18

## 2018-07-01 RX ADMIN — CLOPIDOGREL BISULFATE 75 MG: 75 TABLET, FILM COATED ORAL at 09:19

## 2018-07-01 RX ADMIN — AMLODIPINE BESYLATE 10 MG: 10 TABLET ORAL at 09:19

## 2018-07-01 ASSESSMENT — PAIN SCALES - GENERAL
PAINLEVEL_OUTOF10: 0
PAINLEVEL_OUTOF10: 0

## 2018-07-01 NOTE — PROGRESS NOTES
tube feedings  Groomin - Did not occur  Bathin - Did not occur  Dressing-Upper: 0 - Did not occur  Dressing-Lower: 0 - Did not occur  Toiletin - Total assist  Toilet Transfer: 4 - Requires steadying assistance only < 25% assist  Shower Transfer: 0 - Activity does not occur,  , Assessment: Pt. demonstrated decreased ability to express basic needs to OT as well as decreased ability to perform ADL tasks. Pt. has decreased sequencing and recall of directions. Pt. requires mod verbal cues to engage in tasks and to perform all OT tasks. Pt. continues to benefit from skilled OT to improve independence with ADLs and IADLs. Speech therapy: FIMS: Comprehension: 2 - Patient understands basic needs 25-49% of the time  Expression: 2 - Expresses basic ideas/needs 25-49% of the time  Social Interaction: 2 - Patient appropriate  25%-49% of the time  Problem Solvin - Patient solves simple/routine tasks < 25%  Memory: 1 - Patient remembers < 25% of the time      Lab/X-ray studies reviewed, analyzed and discussed with patient and staff:   No results found for this or any previous visit (from the past 24 hour(s)). Previous extensive, complex labs, notes and diagnostics reviewed and analyzed. ALLERGIES:    Allergies as of 2018    (No Known Allergies)      (please also verify by checking [facility])     Complex Physical Medicine & Rehab Issues Assess & Plan:   1. 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 Functional and medical status reassessed regarding patients ability to participate in therapies and patient found to be able to participate in acute intensive comprehensive inpatient rehabilitation program including PT/OT to improve balance, ambulation, ADLs, and to improve the P/AROM.   Therapeutic modifications regarding activities in therapies, place, amount of time per day

## 2018-07-01 NOTE — PROGRESS NOTES
LEUKOCYTESUR in the last 72 hours.     Invalid input(s):  Rhesa Nails,  SPECGRAV,  NITRU     Assessment/Plan:  Likely her LOC was cardiac in origin  Currently not delirious, With slow improvement  We will continue to monitor her clinical progress  Can repeat CT head on  6/20 depending on patients hemodynamic stability  EEG done showed diffuse slowing  Continue supportive measures  Lab work showed elevated TSH, to watch  Vitamin D level was low we shall supplement  Anemia being washed her    On Plavix and aspirin   Patient is improving  Pt low level in fuction    Skylar Locke MD

## 2018-07-02 LAB
BASOPHILS ABSOLUTE: 0.1 K/UL (ref 0–0.2)
BASOPHILS RELATIVE PERCENT: 0.6 %
EOSINOPHILS ABSOLUTE: 0.1 K/UL (ref 0–0.7)
EOSINOPHILS RELATIVE PERCENT: 1 %
HCT VFR BLD CALC: 28.7 % (ref 37–47)
HEMOGLOBIN: 9.7 G/DL (ref 12–16)
LYMPHOCYTES ABSOLUTE: 1.3 K/UL (ref 1–4.8)
LYMPHOCYTES RELATIVE PERCENT: 12.4 %
MCH RBC QN AUTO: 29.6 PG (ref 27–31.3)
MCHC RBC AUTO-ENTMCNC: 33.8 % (ref 33–37)
MCV RBC AUTO: 87.4 FL (ref 82–100)
MONOCYTES ABSOLUTE: 0.8 K/UL (ref 0.2–0.8)
MONOCYTES RELATIVE PERCENT: 7.5 %
NEUTROPHILS ABSOLUTE: 8.3 K/UL (ref 1.4–6.5)
NEUTROPHILS RELATIVE PERCENT: 78.5 %
PDW BLD-RTO: 17.6 % (ref 11.5–14.5)
PLATELET # BLD: 414 K/UL (ref 130–400)
RBC # BLD: 3.28 M/UL (ref 4.2–5.4)
WBC # BLD: 10.6 K/UL (ref 4.8–10.8)

## 2018-07-02 PROCEDURE — 97535 SELF CARE MNGMENT TRAINING: CPT

## 2018-07-02 PROCEDURE — 6370000000 HC RX 637 (ALT 250 FOR IP): Performed by: PHYSICAL MEDICINE & REHABILITATION

## 2018-07-02 PROCEDURE — 0HBRXZZ EXCISION OF TOE NAIL, EXTERNAL APPROACH: ICD-10-PCS | Performed by: PODIATRIST

## 2018-07-02 PROCEDURE — 85025 COMPLETE CBC W/AUTO DIFF WBC: CPT

## 2018-07-02 PROCEDURE — 97530 THERAPEUTIC ACTIVITIES: CPT

## 2018-07-02 PROCEDURE — 97127 HC OT THER IVNTJ W/FOCUS COG FUNCJ: CPT

## 2018-07-02 PROCEDURE — 92507 TX SP LANG VOICE COMM INDIV: CPT

## 2018-07-02 PROCEDURE — 36415 COLL VENOUS BLD VENIPUNCTURE: CPT

## 2018-07-02 PROCEDURE — 1180000000 HC REHAB R&B

## 2018-07-02 PROCEDURE — 6370000000 HC RX 637 (ALT 250 FOR IP): Performed by: INTERNAL MEDICINE

## 2018-07-02 PROCEDURE — 99232 SBSQ HOSP IP/OBS MODERATE 35: CPT | Performed by: PHYSICAL MEDICINE & REHABILITATION

## 2018-07-02 PROCEDURE — 97116 GAIT TRAINING THERAPY: CPT

## 2018-07-02 RX ORDER — DIAPER,BRIEF,INFANT-TODD,DISP
EACH MISCELLANEOUS 2 TIMES DAILY
Status: DISCONTINUED | OUTPATIENT
Start: 2018-07-02 | End: 2018-07-12

## 2018-07-02 RX ORDER — DOCUSATE SODIUM 100 MG/1
100 CAPSULE, LIQUID FILLED ORAL 2 TIMES DAILY PRN
Status: DISCONTINUED | OUTPATIENT
Start: 2018-07-02 | End: 2018-07-05

## 2018-07-02 RX ADMIN — AMLODIPINE BESYLATE 10 MG: 10 TABLET ORAL at 10:54

## 2018-07-02 RX ADMIN — MEGESTROL ACETATE 200 MG: 40 SUSPENSION ORAL at 10:52

## 2018-07-02 RX ADMIN — HYDROCHLOROTHIAZIDE 25 MG: 25 TABLET ORAL at 10:54

## 2018-07-02 RX ADMIN — HYDRALAZINE HYDROCHLORIDE 100 MG: 50 TABLET, FILM COATED ORAL at 13:55

## 2018-07-02 RX ADMIN — MODAFINIL 100 MG: 100 TABLET ORAL at 10:58

## 2018-07-02 RX ADMIN — MODAFINIL 100 MG: 100 TABLET ORAL at 13:54

## 2018-07-02 RX ADMIN — Medication 100 MG: at 10:57

## 2018-07-02 RX ADMIN — Medication 5000 UNITS: at 10:58

## 2018-07-02 RX ADMIN — METOPROLOL SUCCINATE 25 MG: 25 TABLET, EXTENDED RELEASE ORAL at 12:30

## 2018-07-02 RX ADMIN — Medication 100 MG: at 13:54

## 2018-07-02 RX ADMIN — HYDRALAZINE HYDROCHLORIDE 100 MG: 50 TABLET, FILM COATED ORAL at 20:27

## 2018-07-02 RX ADMIN — LOSARTAN POTASSIUM 50 MG: 50 TABLET, FILM COATED ORAL at 10:54

## 2018-07-02 RX ADMIN — Medication 1 DROP: at 20:31

## 2018-07-02 RX ADMIN — MEGESTROL ACETATE 200 MG: 40 SUSPENSION ORAL at 16:12

## 2018-07-02 RX ADMIN — Medication 1 DROP: at 12:32

## 2018-07-02 RX ADMIN — Medication: at 14:00

## 2018-07-02 RX ADMIN — Medication: at 20:31

## 2018-07-02 RX ADMIN — CLOPIDOGREL BISULFATE 75 MG: 75 TABLET, FILM COATED ORAL at 10:57

## 2018-07-02 RX ADMIN — SIMVASTATIN 20 MG: 20 TABLET, FILM COATED ORAL at 20:27

## 2018-07-02 RX ADMIN — ASPIRIN 81 MG 81 MG: 81 TABLET ORAL at 10:57

## 2018-07-02 RX ADMIN — Medication 1 DROP: at 10:58

## 2018-07-02 RX ADMIN — LEVOTHYROXINE SODIUM 25 MCG: 25 TABLET ORAL at 06:33

## 2018-07-02 RX ADMIN — BACITRACIN ZINC 1 G: 500 OINTMENT TOPICAL at 20:27

## 2018-07-02 RX ADMIN — HYDRALAZINE HYDROCHLORIDE 100 MG: 50 TABLET, FILM COATED ORAL at 06:33

## 2018-07-02 RX ADMIN — Medication: at 11:04

## 2018-07-02 RX ADMIN — FLUCONAZOLE 100 MG: 100 TABLET ORAL at 10:58

## 2018-07-02 RX ADMIN — ERGOCALCIFEROL 50000 UNITS: 1.25 CAPSULE ORAL at 12:32

## 2018-07-02 ASSESSMENT — PAIN SCALES - GENERAL
PAINLEVEL_OUTOF10: 0
PAINLEVEL_OUTOF10: 0

## 2018-07-02 NOTE — PROGRESS NOTES
Neuro/Psychiatric: Affect: flat but pleasant. Alert and oriented to person, place and     situation. No significant change in deep tendon reflexes or     Sensation-right hemiparesis secondary CVA  Lungs:  Diminished but CTA-B. Respiration effort is normal at rest.     Heart:   S1 = S2, RRR. No loud murmurs. Abdomen:  Soft, non-tender, no enlargement of liver or spleen. Extremities:  No significant lower extremity edema or tenderness. Skin:   Intact buttocks slightly reddened covered with Mepilex upper extremity skin tears and    left heel redness-severe dandruff on her scalp    Rehabilitation:  Physical therapy: FIMS:  Bed Mobility: Scooting: Stand by assistance    Transfers: Sit to Stand: Contact guard assistance, Minimal Assistance  Stand to sit: Minimal Assistance  Bed to Chair: Stand by assistance, Contact guard assistance  Stand Pivot Transfers: Contact guard assistance, Ambulation 1  Surface: carpet  Device: Rollator  Assistance: Minimal assistance, Contact guard assistance  Quality of Gait: decreased control of ad after 50 feet, nbos, deviates to one side of path and needs physical assistance to not bump into items. Distance: 80 feet x 2   Comments: needed seated rest break after 80 feet. poor approach to sit and wanted to lay down instead of continuing her walk. ,      FIMS: Bed, Chair, Wheel Chair: 4 - Requires steadying assistance only <25% assist  and/or requires assist with one leg only  Walk: 4 - Contact Guard/Minimal Assistance Requires up to Contact Guard or Minimal Assistance to walk/operate wheelchair at least 150 feet  Distance Walked: 8  Wheel Chair: 0 - Activity Not Assessed/Does Not Occur  Distance Traveled in Wheel Chair: 0  Stairs: 0 - Activity Does not Occur ( 0 only for the admission assessment),  , Assessment: Patient refusing gait in PM. Agreeable to seated exercises. Max tactile cues with demo needed for proper form, full ROM, and continuous reps-poor carryover.  Required assist to count reps d/t cognitive deficits    Occupational therapy: FIMS:  Eatin - Feeds self with setup/supervision/cues and/or requires only setup/supervision/cues to perform tube feedings  Groomin - Did not occur  Bathin - Did not occur  Dressing-Upper: 0 - Did not occur  Dressing-Lower: 0 - Did not occur  Toiletin - Total assist  Toilet Transfer: 4 - Requires steadying assistance only < 25% assist  Shower Transfer: 0 - Activity does not occur,  , Assessment: Pt. demonstrated decreased ability to express basic needs to OT as well as decreased ability to perform ADL tasks. Pt. has decreased sequencing and recall of directions. Pt. requires mod verbal cues to engage in tasks and to perform all OT tasks. Pt. continues to benefit from skilled OT to improve independence with ADLs and IADLs.      Speech therapy: FIMS: Comprehension: 2 - Patient understands basic needs 25-49% of the time  Expression: 2 - Expresses basic ideas/needs 25-49% of the time  Social Interaction: 2 - Patient appropriate  25%-49% of the time  Problem Solvin - Patient solves simple/routine tasks < 25%  Memory: 1 - Patient remembers < 25% of the time      Lab/X-ray studies reviewed, analyzed and discussed with patient and staff:   Recent Results (from the past 24 hour(s))   CBC Auto Differential    Collection Time: 18  5:02 AM   Result Value Ref Range    WBC 10.6 4.8 - 10.8 K/uL    RBC 3.28 (L) 4.20 - 5.40 M/uL    Hemoglobin 9.7 (L) 12.0 - 16.0 g/dL    Hematocrit 28.7 (L) 37.0 - 47.0 %    MCV 87.4 82.0 - 100.0 fL    MCH 29.6 27.0 - 31.3 pg    MCHC 33.8 33.0 - 37.0 %    RDW 17.6 (H) 11.5 - 14.5 %    Platelets 619 (H) 196 - 400 K/uL    Neutrophils % 78.5 %    Lymphocytes % 12.4 %    Monocytes % 7.5 %    Eosinophils % 1.0 %    Basophils % 0.6 %    Neutrophils # 8.3 (H) 1.4 - 6.5 K/uL    Lymphocytes # 1.3 1.0 - 4.8 K/uL    Monocytes # 0.8 0.2 - 0.8 K/uL    Eosinophils # 0.1 0.0 - 0.7 K/uL    Basophils # 0.1 0.0 - 0.2 K/uL Previous extensive, complex labs, notes and diagnostics reviewed and analyzed. ALLERGIES:    Allergies as of 06/25/2018    (No Known Allergies)      (please also verify by checking [facility])     Complex Physical Medicine & Rehab Issues Assess & Plan:   1. 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 Functional and medical status reassessed regarding patients ability to participate in therapies and patient found to be able to participate in acute intensive comprehensive inpatient rehabilitation program including PT/OT to improve balance, ambulation, ADLs, and to improve the P/AROM. Therapeutic modifications regarding activities in therapies, place, amount of time per day and intensity of therapy made daily. In bed therapies or bedside therapies prn.   2. Bowel and Bladder dysfunction-bowel and Urinary incontinence:  frequent toileting, ambulate to bathroom with assistance, check post void residuals. Check for C.difficile x1 if >2 loose stools in 24 hours, continue bowel & bladder program.  Monitor bowel and bladder function. Lactinex 2 PO every AC. MOM prn, Brown Bomb prn, Glycerin suppository prn, enema prn. Scheduled toileting add neurogenic bowel program.  3. Severe neck and low back pain generalized OA pain: reassess pain every shift and prior to and after each therapy session, give prn  Percocet or Tylenol, modalities prn in therapy, Lidoderm, K-pad prn.   4. Skin healing at bilateral upper extremities, heel, buttocks and new pacer site and breakdown risk:  continue pressure relief program.  At [employer] shampoo to her scalp Daily skin exams and reports from nursing. Betadine to incision  5. Severe frailty and fatigue due to Nutritional and hydration deficiency:  continue to monitor I&Os, calorie counts prn, dietary consult prn.    Encourage protein supplementation vitamin B12 and CoQ10  titrate Megace add protein supplementation add one-on-one encouragement supervision for feeds  6. Acute episodic insomnia with situational adjustment disorder:  prn Ambien, monitor for day time sedation. 7. Falls risk elevated:  patient to use call light to get nursing assistance to get up, bed and chair alarm. 8. Elevated DVT risk: progressive activities in PT, continue prophylaxis MELISSA hose, elevation and  consult neurology and cardiology to find with blood thinners she can tolerate-she is currently taking aspirin and Plavix  9. Complex discharge planning:  Discharge July 15, 2018 home with daughter who works and home health care PT OT RN aide  and speech therapist as well as a rolling walker Weekly team meeting every Thursday to assess progress towards goals, discuss and address social, psychological and medical comorbidities and to address difficulties they may be having progressing in therapy. Patient and family education is in progress. The patient is to follow-up with their family physician after discharge. Complex Active General Medical Issues that complicate care Assess & Plan:        1.  acute likely aspiration related Pneumonia of both lungs due to infectious organism-patient is now on Augmentin every 12 hours for 5 days first dose was June 25, 2018, prevent  aspiration  -feeding only with assist up in chair with modified diet and consult speech and language pathology-she is on Augmentin  2. Severe protein-calorie malnutrition-dietary consult add nutritional supplementation protein and vitamin B-12  3.    Complete heart block history with recent pacemaker placement,  HTN (hypertension), MI (myocardial infarction), History of PTCA with stents-vital signs checks every shift, monitor for chest pain, dose and titrate Norvasc, Apresoline, Plavix, Hydrodiuril, Cozaar, aspirin, Toprol-XL, Zocor-consult hospitals for backup medical-cardiology will come in to check

## 2018-07-02 NOTE — PROGRESS NOTES
Occupational Therapy  Facility/Department: [facility]  Daily Treatment Note  NAME: Santi Ruvalcaba  : 1933  MRN: [id number]    Date of Service: 2018    Discharge Recommendations:  Continue to assess pending progress       Patient Diagnosis(es): There were no encounter diagnoses. has a past medical history of HTN (hypertension) and Hypothyroidism. has no past surgical history on file. Restrictions  Restrictions/Precautions  Restrictions/Precautions: Modified Diet, Fall Risk  Position Activity Restriction  Other position/activity restrictions: Pacemaker precautions  Subjective   General  Chart Reviewed: Yes  Patient assessed for rehabilitation services?: Yes  Response to previous treatment: Patient with no complaints from previous session  Family / Caregiver Present: No  Referring Practitioner: Dr. Alexander Pod  Diagnosis: Right hemiparesis secondary to left cerebral infarct   Pre Treatment Pain Screening  Pain at present: 0  Scale Used: Numeric Score  Intervention List: Patient able to continue with treatment  Pain Assessment  Patient Currently in Pain: No  Pain Assessment: 0-10  Pain Level: 0  Vital Signs  Patient Currently in Pain: No   Orientation  Orientation  Overall Orientation Status: Impaired  Orientation Level: Oriented to person;Disoriented to person;Disoriented to place; Disoriented to time;Disoriented to situation  Objective    ADL  Feeding:  (Did not observe feeding this date; pt. is not a reliable source.)  Grooming:  Moderate assistance (Verbal cues to sequence brushing teeth, and assist for thoroughness combing hair)  UE Bathing: Supervision  LE Bathing: Minimal assistance (Assist with rear mahsa bathing; pt. unable to complete thoroughly)  UE Dressing: Setup (No bra)  LE Dressing: Setup (No shoes, assist for fastening brief due to pt. soiled her pull up brief)  Toileting: Other (Comment)  Additional Comments: Pt. was incontinent of bowel multiple times throughout OT tx. Pt. required total

## 2018-07-02 NOTE — PROGRESS NOTES
Physical Therapy Rehab Treatment Note  Facility/Department: [facility]  Room: R251/R251-01       NAME: Yessi Espinal  : 1933 (80 y.o.)  MRN: [id number]  CODE STATUS: Limited    Date of Service: 2018  Chart Reviewed: Yes  General Comment  Comments: Pt much more expressive today. Able to say name and birthday and communicate correctly. Restrictions:  Restrictions/Precautions: Modified Diet, Fall Risk  Position Activity Restriction  Other position/activity restrictions: Pacemaker precautions       SUBJECTIVE: Response To Previous Treatment: Patient with no complaints from previous session. Pre Treatment Pain Screening  Pain at present: 0  Scale Used: Numeric Score  Intervention List: Patient able to continue with treatment    Post Treatment Pain Screenin/10       OBJECTIVE:   Orientation Level: Disoriented to situation;Oriented to place;Oriented to person;Disoriented to time (knows it's July only)           Neuromuscular Education  NDT Treatment: Standing  Neuromuscular Comments: Pt unable to maintain balance > 10 to 15 seconds without support with retro LOB and poor balance reactions other than grabbing therapist.      Bed mobility  Bridging: Supervision  Rolling to Left: Supervision  Rolling to Right: Supervision  Supine to Sit: Supervision;Stand by assistance  Sit to Supine: Supervision;Stand by assistance  Scooting: Supervision    Transfers  Sit to Stand: Stand by assistance  Stand to sit: Stand by assistance;Contact guard assistance  Bed to Chair: Stand by assistance;Contact guard assistance  Stand Pivot Transfers: Contact guard assistance (without AD)  Comment: Toilet transfer with CGA    Ambulation  Ambulation?: Yes  Ambulation 1  Surface: level tile;carpet;ramp  Device: Rollator  Assistance: Stand by assistance;Contact guard assistance  Quality of Gait: Steadier gait with NBOS, but without LOB. Able to follow verbal cues for directional needs.   Distance: 200'  Stairs/Curb  Stairs?: Yes Stairs  # Steps : 4  Stairs Height: 6\"  Rails: Bilateral  Assistance: Contact guard assistance;Minimal assistance  Comment: Reciprocal ascending and descending. Gait with HHA short distances with CGA/Minimal Assist and improved balance; limited by destination. Activity Tolerance  Activity Tolerance: Patient limited by cognitive status          ASSESSMENT/COMMENTS:  Poor safety as pt trying to sit down on rollator after standing or completing stairs and had to be cued not to sit down and that it is a walker, not a chair. Gait balance still safest with rollator, but still needs close SBA as pt with poor safety awareness. PLAN OF CARE/Safety:   Safety Devices  Type of devices: All fall risk precautions in place; Chair alarm in place      Therapy Time:   Individual   Time In 0900   Time Out 0930   Minutes3 30     Minutes:30      Transfer/Bed mobility training: 15      Gait trainin      Neuro re education: 2     Therapeutic ex:      Shi Dunbar PTA, 18 at 11:50 AM

## 2018-07-02 NOTE — PROGRESS NOTES
gait; no LOB  Distance: 50'  Stairs/Curb  Stairs?: Yes   Stairs  # Steps : 4  Stairs Height: 6\"  Rails: Bilateral  Assistance: Contact guard assistance  Comment: Reciprocal ascending and descending. Activity Tolerance  Activity Tolerance: Patient limited by cognitive status          ASSESSMENT/COMMENTS:  Pt still confused and is unsafe to be left alone. PLAN OF CARE/Safety:   Safety Devices  Type of devices: All fall risk precautions in place; Chair alarm in place;Gait belt      Therapy Time:   Individual   Time In 1430   Time Out 1500   Minutes 30     Minutes:30      Transfer/Bed mobility training: 10      Gait trainin      Neuro re education:     Therapeutic ex:      Kevin Frey PTA, 18 at 3:01 PM

## 2018-07-03 PROCEDURE — 6370000000 HC RX 637 (ALT 250 FOR IP): Performed by: INTERNAL MEDICINE

## 2018-07-03 PROCEDURE — 97112 NEUROMUSCULAR REEDUCATION: CPT

## 2018-07-03 PROCEDURE — 99232 SBSQ HOSP IP/OBS MODERATE 35: CPT | Performed by: PHYSICAL MEDICINE & REHABILITATION

## 2018-07-03 PROCEDURE — 97127 HC OT THER IVNTJ W/FOCUS COG FUNCJ: CPT

## 2018-07-03 PROCEDURE — 97530 THERAPEUTIC ACTIVITIES: CPT

## 2018-07-03 PROCEDURE — 97116 GAIT TRAINING THERAPY: CPT

## 2018-07-03 PROCEDURE — 92526 ORAL FUNCTION THERAPY: CPT

## 2018-07-03 PROCEDURE — 1180000000 HC REHAB R&B

## 2018-07-03 PROCEDURE — 6370000000 HC RX 637 (ALT 250 FOR IP): Performed by: PHYSICAL MEDICINE & REHABILITATION

## 2018-07-03 PROCEDURE — 97535 SELF CARE MNGMENT TRAINING: CPT

## 2018-07-03 RX ADMIN — BACITRACIN ZINC 1 G: 500 OINTMENT TOPICAL at 08:17

## 2018-07-03 RX ADMIN — MODAFINIL 100 MG: 100 TABLET ORAL at 12:54

## 2018-07-03 RX ADMIN — MEGESTROL ACETATE 200 MG: 40 SUSPENSION ORAL at 15:40

## 2018-07-03 RX ADMIN — HYDROCHLOROTHIAZIDE 25 MG: 25 TABLET ORAL at 08:16

## 2018-07-03 RX ADMIN — HYDRALAZINE HYDROCHLORIDE 100 MG: 50 TABLET, FILM COATED ORAL at 05:19

## 2018-07-03 RX ADMIN — KETOCONAZOLE: 20 SHAMPOO TOPICAL at 08:18

## 2018-07-03 RX ADMIN — Medication: at 14:42

## 2018-07-03 RX ADMIN — BACITRACIN ZINC 1 G: 500 OINTMENT TOPICAL at 20:51

## 2018-07-03 RX ADMIN — Medication 1 DROP: at 08:17

## 2018-07-03 RX ADMIN — Medication 100 MG: at 12:54

## 2018-07-03 RX ADMIN — HYDRALAZINE HYDROCHLORIDE 100 MG: 50 TABLET, FILM COATED ORAL at 15:39

## 2018-07-03 RX ADMIN — LEVOTHYROXINE SODIUM 25 MCG: 25 TABLET ORAL at 05:19

## 2018-07-03 RX ADMIN — Medication: at 05:20

## 2018-07-03 RX ADMIN — MODAFINIL 100 MG: 100 TABLET ORAL at 08:16

## 2018-07-03 RX ADMIN — Medication 1 DROP: at 12:55

## 2018-07-03 RX ADMIN — FLUCONAZOLE 100 MG: 100 TABLET ORAL at 08:17

## 2018-07-03 RX ADMIN — ASPIRIN 81 MG 81 MG: 81 TABLET ORAL at 08:15

## 2018-07-03 RX ADMIN — AMLODIPINE BESYLATE 10 MG: 10 TABLET ORAL at 08:17

## 2018-07-03 RX ADMIN — MEGESTROL ACETATE 200 MG: 40 SUSPENSION ORAL at 08:16

## 2018-07-03 RX ADMIN — CLOPIDOGREL BISULFATE 75 MG: 75 TABLET, FILM COATED ORAL at 08:15

## 2018-07-03 RX ADMIN — Medication 100 MG: at 08:15

## 2018-07-03 RX ADMIN — LOSARTAN POTASSIUM 50 MG: 50 TABLET, FILM COATED ORAL at 08:16

## 2018-07-03 RX ADMIN — Medication 1 DROP: at 20:51

## 2018-07-03 RX ADMIN — Medication: at 20:51

## 2018-07-03 RX ADMIN — HYDRALAZINE HYDROCHLORIDE 100 MG: 50 TABLET, FILM COATED ORAL at 20:50

## 2018-07-03 RX ADMIN — Medication 5000 UNITS: at 08:16

## 2018-07-03 RX ADMIN — SIMVASTATIN 20 MG: 20 TABLET, FILM COATED ORAL at 20:51

## 2018-07-03 RX ADMIN — ERGOCALCIFEROL 50000 UNITS: 1.25 CAPSULE ORAL at 08:16

## 2018-07-03 RX ADMIN — METOPROLOL SUCCINATE 25 MG: 25 TABLET, EXTENDED RELEASE ORAL at 08:16

## 2018-07-03 ASSESSMENT — PAIN SCALES - GENERAL
PAINLEVEL_OUTOF10: 0
PAINLEVEL_OUTOF10: 0

## 2018-07-03 NOTE — PROGRESS NOTES
CARDIOLOGY 14[address]  [facility] PROGRESS NOTE         7/3/2018      Matthew Fermin    [id number]  7/7/1933    Rounding MD: Alyssia Khalil MD ,[facility] [place]    Primary Cardiologist: Rachel Rachel MD     Requesting Physician:  Yumiko Lynn DO      Reason for Initial Consult:  Left Arm Edema post Recent Pacemaker    SUBJECTIVE:     Patient has no new complaints, Doing well in rehab. Denies chest pain, dyspnea, fatigue, irregular heart beat, near-syncope and palpitations. Cardiac and general ROS otherwise negative and unchanged.         Assessment:     1. Upper extremity edema, post recent pacemaker implantation  2. Recent Mental Status Changes, stable   3. Recent PNA, aspiration  4. 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 NSTEMI Recent 6/17/18  6. CAD 1V RCA, Post ED RCA 6/17/18  7. Preserved LV Function, LVEF 50%. 8. HTN  9. Hypothyroidism  10. Anemia  11. Hypoalbumia   12. Malnutrition  13. Hx Remote Cholecystectomy  14. Non Smoker     Plan:     1. Cardiac Supportive Care  2. Post Pacemaker site care and immobilization as tolerated. 3. Watch Hemaglobin  4. Continue current medications  5. Continue Rehab once ok with others. 6. Further recommendations to follow. 7. See Orders        HISTORY OF PRESENT ILLNESS:      Sarah Bangura is a pleasant 80 y. o. female who presented initially 6/17/18 to [facility] with mental status changes and findings of complete heart block with junctional escape rhythm and heart rates in the 30s. He underwent cardiac catheterization urgently with right coronary artery recanalization angioplasty stenting performed by Dr. Magali Mancilla. Temporary pacemaker was implanted. Subsequent permanent pacemaker was eventually placed by Dr. Ricardo Colorado last week. Patient had a small stroke and is well as aspiration pneumonia and element of dementia. Patient was transferred to rehab doing well overall.   However in rehab she was noted to have left arm edema.  Cardiology was asked to see for further evaluation and treatment.     Patient History and Records, EMR reviewed. Sheyla Furbish and examined. Poor historian.     Patient is unable to give reliable history. Denies any current symptomatology. No complaints.     Denies CP, SOB, LH, Dizziness, TIA or CVA Symptoms.  No Orthopnea, Edema or CHF symptoms.  No Palpitations.  No Syncope.  No Fever, Chills or Cold symptoms.  No GI,  or Bleeding complaints.     Cardiac and general ROS otherwise negative.     14 System ROS otherwise negative other than noted.     Past Medical History        Past Medical History:   Diagnosis Date    HTN (hypertension) 6/25/2018    Hypothyroidism 6/25/2018            Past Surgical History   History reviewed.  No pertinent surgical history.        Home Medications         OBJECTIVE:     MEDICATIONS:     Scheduled Meds:   bacitracin zinc   Topical BID    ketoconazole   Topical Daily    cyanocobalamin  1,000 mcg Intramuscular Weekly    coenzyme Q10  100 mg Oral BID AC    megestrol acetate  200 mg Oral BID WC    modafinil  100 mg Oral BID    cinnamon oil  1 drop Oral TID    nystatin, stomahesive in petrolatum   Topical 3 times per day    simvastatin  20 mg Oral Nightly    losartan  50 mg Oral Daily    amLODIPine  10 mg Oral Daily    aspirin  81 mg Oral Daily    clopidogrel  75 mg Oral Daily    hydrALAZINE  100 mg Oral 3 times per day    hydrochlorothiazide  25 mg Oral Daily    levothyroxine  25 mcg Oral Daily    metoprolol succinate  25 mg Oral Daily    vitamin D  5,000 Units Oral Daily     Continuous Infusions:  PRN Meds:docusate sodium, bisacodyl, acetaminophen, magnesium hydroxide, oxyCODONE-acetaminophen, hydrALAZINE    PHYSICAL EXAM:    CURRENT VITALS: /62   Pulse 62   Temp 97 °F (36.1 °C) (Oral)   Resp 16   SpO2 98%        CONSTITUTIONAL:  awake, alert, cooperative, no apparent distress, confused   ENT:  Normocephalic, without obvious

## 2018-07-03 NOTE — FLOWSHEET NOTE
We removed the JON sys from patient's room this morning and then 2 hours later had to get it back. Patient was up trying to walk around without moving the wheelchair legs. She ate very well for lunch and had seconds from the cafeteria.

## 2018-07-03 NOTE — PROGRESS NOTES
to match parquetry shapes on board with mod verbal cues and max increased time to complete the activity. Pt. completed hand strengthening task with primarily R hand; pt. was able to place 5/5 grades of graded clothespins on dowels with min verbal cues throughout for sequencing. Pt. demonstrates improved attention to task but continues to have difficulty with problem solving and sequencing. Assessment   Assessment: Pt. demonstrates improved direction following but continues to require cues for initiation and task changes. Pt. also requires encouragement to continue. Pt. has moderate difficulty with balance consistnetly and cues for safety. Pt. continues to benefit from skilled OT to address these deficits and improve independence with ADLs and IADLs. Activity Tolerance  Activity Tolerance: Patient Tolerated treatment well  Activity Tolerance: Pt. continues to require frequent cues for initiation  Safety Devices  Safety Devices in place: Yes  Type of devices: All fall risk precautions in place          Plan   Plan  Times per week:  minutes per day, 5-7 days per week  Plan weeks: 2-3 weeks   Current Treatment Recommendations: Strengthening, ROM, Balance Training, Functional Mobility Training, Endurance Training, Neuromuscular Re-education, Cognitive Reorientation, Safety Education & Training, Patient/Caregiver Education & Training, Equipment Evaluation, Education, & procurement, Self-Care / ADL, Home Management Training, Cognitive/Perceptual Training  Plan Comment: Continue per OT POC   G-Code     OutComes Score  AM-PAC Score  Goals  Patient Goals   Patient goals : \"Take care of myself. \"        Therapy Time   Individual Concurrent Group Co-treatment   Time In 1000         Time Out 1100         Minutes [id number] Victoria Cosme, OTR/L Electronically signed by MERY Fleming/L on 7/3/2018 at 2:20 PM

## 2018-07-03 NOTE — PROGRESS NOTES
Physical Therapy Rehab Treatment Note  Facility/Department: [facility]  Room: R251/R251-01       NAME: Ursula Sanchez  : 1933 (80 y.o.)  MRN: [id number]  CODE STATUS: Limited    Date of Service: 7/3/2018  Chart Reviewed: Yes  Family / Caregiver Present: No  General Comment  Comments: Pt with improved conversation today, again. Pt still confused, however, and makes poor judgements. Restrictions:  Restrictions/Precautions: Modified Diet, Fall Risk  Position Activity Restriction  Other position/activity restrictions: Pacemaker precautions       SUBJECTIVE: Subjective: \"Can you call my friend Jasvir Nieves? She lives in [facility]. \" Asked several times  Response To Previous Treatment: Patient with no complaints from previous session. Pain Screening  Patient Currently in Pain: Denies  Pre Treatment Pain Screening  Pain at present: 0  Scale Used: Numeric Score  Intervention List: Patient able to continue with treatment         OBJECTIVE:   Overall Orientation Status: Impaired  Orientation Level: Oriented to place; Disoriented to situation;Disoriented to time;Oriented to person (a slight improvement with time)             Outcomes Measures:  Tapia Balance Score: 23       Bed mobility  Comment: NT PM    Transfers  Sit to Stand: Stand by assistance  Stand to sit: Stand by assistance    Ambulation  Ambulation?: Yes  Ambulation 1  Surface: level tile;carpet  Device: Rollator  Assistance: Stand by assistance  Quality of Gait: Gait steady with AD. Pt not attempting to sit down on rollator with sign on seat and cone also on seat. Distance: 150'    Stairs?: No   NT in PM secondary to time. ASSESSMENT/COMMENTS:  Pt with improved Tapia test.  Initially only scored a 6/56 but is now a 23/56. Pt taken to restroom secondary to BM accident again today. Pt with poor awareness of having BM, and will deny it. PLAN OF CARE/Safety:   Safety Devices  Type of devices: All fall risk precautions in place; Chair alarm in place;Gait

## 2018-07-03 NOTE — PROGRESS NOTES
/10    Comment(s):     ANXIETY ASSESSMENT    Before MT:      [x] No     [] Yes   Rating:  /10    Comment(s):    After MT:         [x] No     [] Yes   Rating:   /10    Comment(s):       ASSESSMENT/OBSERVATIONS:     Patient tolerated todays treatment session:      [x] Good          [] Fair          [] Poor        Comment(s): Pt sitting up in wheelchair, alert and oriented to herself, with no c/o pain or discomfort. Pt actively engaged in music therapy by discussing music interests, singing along, and actively listening to the music. Pt responded positively to the music therapy as evidence by improved mood, increased relaxation and increased positive facial affect and comments. PLAN:      [x] Pt interested in having music therapy again. [x] MT-BC will attempt to see pt again next week. [] Pt's planned d/c date is before MT-BC is scheduled on unit again. [] Pt NOT interested in having music therapy again.             Electronically signed by Kristin Chin on 7/3/2018 at 11:36 AM  Electronically signed by: LATOSHA Stauffer  Date: 7/3/2018

## 2018-07-03 NOTE — PROGRESS NOTES
has made improvement of 10 points. PLAN OF CARE/Safety:   Safety Devices  Type of devices: All fall risk precautions in place; Chair alarm in place;Gait belt      Therapy Time:   Individual   Time In 900   Time Out 930   Minutes 30     Minutes:30      Transfer/Bed mobility training:      Gait trainin      Neuro re education: 10     Therapeutic ex:      yKra Sepulveda PTA, 18 at 9:23 AM

## 2018-07-03 NOTE — PROGRESS NOTES
Subjective: The patient complains of severe  acute  right-sided weakness and aphasia and dysphagia partially relieved by  PT, OT, speech pathology and exacerbated by  overexertion and recent left cerebral infarcts. I am concerned about patients progressive depression and chronic pain issues as well as her need for Percocet for her back and neck pain. I am concerned about her complaint of loose stools I'll change her Colace to when necessary. Initially triggered of her video monitoring system however she is increasingly impulsive and 82 decrease her stimulant medication and I will resume her video monitoring until then. ROS x10: The patient also complains of severely impaired mobility and activities of daily living. Otherwise no new problems with vision, hearing, nose, mouth, throat, dermal, cardiovascular, GI, , pulmonary, musculoskeletal, psychiatric or neurological. See Rehab H&P on Rehab chart dated . Vital signs:  BP (!) 171/69   Pulse 69   Temp 98 °F (36.7 °C) (Oral)   Resp 18   SpO2 98%   I/O:   PO/Intake:  fair PO intake,  dysphagia 2 with one-on-one supervision assist for feeds    Bowel/Bladder:   Incontinent of bowel and bladder  General:  Patient is  frail and cachectic, adequately nourished, non-obese and     well kempt. HEENT:    PERRLA, hearing intact to loud voice, external inspection of ear     and nose benign. Inspection of lips, tongue and gums-severe thrush  Musculoskeletal: No significant change in strength or tone. All joints stable. Inspection and palpation of digits and nails show no clubbing,       cyanosis or inflammatory conditions. Neuro/Psychiatric: Affect: flat but pleasant. Alert and oriented to person, place and     situation. No significant change in deep tendon reflexes or     Sensation-right hemiparesis secondary CVA  Lungs:  Diminished but CTA-B. Respiration effort is normal at rest.     Heart:   S1 = S2, RRR.   No loud murmurs. Abdomen:  Soft, non-tender, no enlargement of liver or spleen. Extremities:  No significant lower extremity edema or tenderness. Skin:   Intact buttocks slightly reddened covered with Mepilex upper extremity skin tears and    left heel redness-severe dandruff on her scalp    Rehabilitation:  Physical therapy: FIMS:  Bed Mobility: Scooting: Supervision    Transfers: Sit to Stand: Stand by assistance  Stand to sit: Stand by assistance, Contact guard assistance  Bed to Chair: Stand by assistance, Contact guard assistance  Stand Pivot Transfers: Contact guard assistance (without AD), Ambulation 1  Surface: level tile, carpet, ramp  Device: Rollator  Assistance: Contact guard assistance, Stand by assistance  Quality of Gait: Steadier gait with NBOS, but without LOB. Able to follow verbal cues for directional needs. Distance: 150'  Comments: needed seated rest break after 80 feet. poor approach to sit and wanted to lay down instead of continuing her walk. , Stairs  # Steps : 4  Stairs Height: 6\"  Rails: Bilateral  Assistance: Contact guard assistance  Comment: Reciprocal ascending and descending. FIMS: Bed, Chair, Wheel Chair: 4 - Requires steadying assistance only <25% assist  and/or requires assist with one leg only  Walk: 4 - Contact Guard/Minimal Assistance Requires up to Contact Guard or Minimal Assistance to walk/operate wheelchair at least 150 feet  Distance Walked: 200'  Wheel Chair: 0 - Activity Not Assessed/Does Not Occur  Distance Traveled in Wheel Chair: 0  Stairs: 2- Maximal Assistance Performs 25-49% of the effort to go up and down 4 to 6 stairs and requires the assistance of one person only,  , Assessment: Patient refusing gait in PM. Agreeable to seated exercises. Max tactile cues with demo needed for proper form, full ROM, and continuous reps-poor carryover.  Required assist to count reps d/t cognitive deficits    Occupational therapy: FIMS:  Eatin - Feeds self with In bed therapies or bedside therapies prn.   2. Bowel and Bladder dysfunction-bowel and Urinary incontinence:  frequent toileting, ambulate to bathroom with assistance, check post void residuals. Check for C.difficile x1 if >2 loose stools in 24 hours, continue bowel & bladder program.  Monitor bowel and bladder function. Lactinex 2 PO every AC. MOM prn, Brown Bomb prn, Glycerin suppository prn, enema prn. Scheduled toileting add neurogenic bowel program.  3. Severe neck and low back pain generalized OA pain: reassess pain every shift and prior to and after each therapy session, give prn  Percocet or Tylenol, modalities prn in therapy, Lidoderm, K-pad prn.   4. Skin healing at bilateral upper extremities, heel, buttocks and new pacer site and breakdown risk:  continue pressure relief program.  At [employer] shampoo to her scalp Daily skin exams and reports from nursing. Betadine to incision  5. Severe frailty and fatigue due to Nutritional and hydration deficiency:  continue to monitor I&Os, calorie counts prn, dietary consult prn. Encourage protein supplementation vitamin B12 and CoQ10  titrate Megace add protein supplementation add one-on-one encouragement supervision for feeds  6. Acute episodic insomnia with situational adjustment disorder:  prn Ambien, monitor for day time sedation. 7. Falls risk elevated:  patient to use call light to get nursing assistance to get up, bed and chair alarm. 8. Elevated DVT risk: progressive activities in PT, continue prophylaxis MELISSA hose, tho and  consult neurology and cardiology to find with blood thinners she can tolerate-she is currently taking aspirin and Plavix  9.  Complex discharge planning:  Discharge July 15, 2018 home with daughter who works and home health care PT OT RN aide  and speech therapist as well as a rolling walker Weekly team meeting every Thursday to assess progress towards goals, discuss and address social, psychological and

## 2018-07-04 PROCEDURE — 6370000000 HC RX 637 (ALT 250 FOR IP): Performed by: INTERNAL MEDICINE

## 2018-07-04 PROCEDURE — 6370000000 HC RX 637 (ALT 250 FOR IP): Performed by: PHYSICAL MEDICINE & REHABILITATION

## 2018-07-04 PROCEDURE — 97535 SELF CARE MNGMENT TRAINING: CPT

## 2018-07-04 PROCEDURE — 97112 NEUROMUSCULAR REEDUCATION: CPT

## 2018-07-04 PROCEDURE — 97110 THERAPEUTIC EXERCISES: CPT

## 2018-07-04 PROCEDURE — 97127 HC OT THER IVNTJ W/FOCUS COG FUNCJ: CPT

## 2018-07-04 PROCEDURE — 99232 SBSQ HOSP IP/OBS MODERATE 35: CPT | Performed by: PHYSICAL MEDICINE & REHABILITATION

## 2018-07-04 PROCEDURE — 1180000000 HC REHAB R&B

## 2018-07-04 PROCEDURE — 97530 THERAPEUTIC ACTIVITIES: CPT

## 2018-07-04 PROCEDURE — 97116 GAIT TRAINING THERAPY: CPT

## 2018-07-04 RX ADMIN — BACITRACIN ZINC 1 G: 500 OINTMENT TOPICAL at 20:09

## 2018-07-04 RX ADMIN — Medication 100 MG: at 13:03

## 2018-07-04 RX ADMIN — Medication 100 MG: at 09:44

## 2018-07-04 RX ADMIN — METOPROLOL SUCCINATE 25 MG: 25 TABLET, EXTENDED RELEASE ORAL at 09:46

## 2018-07-04 RX ADMIN — BACITRACIN ZINC 1 G: 500 OINTMENT TOPICAL at 09:45

## 2018-07-04 RX ADMIN — CLOPIDOGREL BISULFATE 75 MG: 75 TABLET, FILM COATED ORAL at 09:46

## 2018-07-04 RX ADMIN — DOCUSATE SODIUM 100 MG: 100 CAPSULE, LIQUID FILLED ORAL at 09:45

## 2018-07-04 RX ADMIN — KETOCONAZOLE: 20 SHAMPOO TOPICAL at 09:48

## 2018-07-04 RX ADMIN — MEGESTROL ACETATE 200 MG: 40 SUSPENSION ORAL at 09:44

## 2018-07-04 RX ADMIN — LOSARTAN POTASSIUM 50 MG: 50 TABLET, FILM COATED ORAL at 09:46

## 2018-07-04 RX ADMIN — Medication: at 06:27

## 2018-07-04 RX ADMIN — Medication: at 20:12

## 2018-07-04 RX ADMIN — HYDRALAZINE HYDROCHLORIDE 100 MG: 50 TABLET, FILM COATED ORAL at 20:11

## 2018-07-04 RX ADMIN — AMLODIPINE BESYLATE 10 MG: 10 TABLET ORAL at 09:46

## 2018-07-04 RX ADMIN — Medication 5000 UNITS: at 09:47

## 2018-07-04 RX ADMIN — Medication 1 DROP: at 20:11

## 2018-07-04 RX ADMIN — LEVOTHYROXINE SODIUM 25 MCG: 25 TABLET ORAL at 06:27

## 2018-07-04 RX ADMIN — HYDRALAZINE HYDROCHLORIDE 100 MG: 50 TABLET, FILM COATED ORAL at 06:27

## 2018-07-04 RX ADMIN — ASPIRIN 81 MG 81 MG: 81 TABLET ORAL at 09:45

## 2018-07-04 RX ADMIN — SIMVASTATIN 20 MG: 20 TABLET, FILM COATED ORAL at 20:12

## 2018-07-04 RX ADMIN — HYDROCHLOROTHIAZIDE 25 MG: 25 TABLET ORAL at 09:45

## 2018-07-04 ASSESSMENT — PAIN SCALES - GENERAL
PAINLEVEL_OUTOF10: 0

## 2018-07-04 NOTE — PROGRESS NOTES
Pt incontinent of urine at this time. Rosie care performed. Pt denies needing to be toileted at this hour. Will continue to check pt for incontinence.

## 2018-07-04 NOTE — PROGRESS NOTES
Subjective: The patient complains of severe  acute  right-sided weakness and aphasia and dysphagia partially relieved by  PT, OT, speech pathology and exacerbated by  overexertion and recent left cerebral infarcts. I am concerned about patients progressive depression and chronic pain issues as well as her need for Percocet for her back and neck pain. She is not impulsive today. However CONTINUE the video monitoring system because her recent impulsivity. She seems to be less energetic, she is off the Provigil that I feel that she safer. Her incision for the length a sitter site is good and I discussed her care with her cardiologist.    ROS x10: The patient also complains of severely impaired mobility and activities of daily living. Otherwise no new problems with vision, hearing, nose, mouth, throat, dermal, cardiovascular, GI, , pulmonary, musculoskeletal, psychiatric or neurological. See Rehab H&P on Rehab chart dated . Vital signs:  /64   Pulse 72   Temp 98 °F (36.7 °C) (Oral)   Resp 16   SpO2 97%   I/O:   PO/Intake:  fair PO intake,  dysphagia 2 with one-on-one supervision assist for feeds    Bowel/Bladder:   Incontinent of bowel and bladder  General:  Patient is  frail and cachectic, adequately nourished, non-obese and     well kempt. HEENT:    PERRLA, hearing intact to loud voice, external inspection of ear     and nose benign. Inspection of lips, tongue and gums-severe thrush  Musculoskeletal: No significant change in strength or tone. All joints stable. Inspection and palpation of digits and nails show no clubbing,       cyanosis or inflammatory conditions. Neuro/Psychiatric: Affect: flat but pleasant. Alert and oriented to person, place and     situation. No significant change in deep tendon reflexes or     Sensation-right hemiparesis secondary CVA  Lungs:  Diminished but CTA-B. Respiration effort is normal at rest.     Heart:   S1 = S2, RRR.   No loud murmurs. Abdomen:  Soft, non-tender, no enlargement of liver or spleen. Extremities:  No significant lower extremity edema or tenderness. Skin:   Intact buttocks slightly reddened covered with Mepilex upper extremity skin tears and    left heel redness-severe dandruff on her scalp-healing left upper chest pacer incision    Rehabilitation:  Physical therapy: FIMS:  Bed Mobility: Scooting: Supervision    Transfers: Sit to Stand: Stand by assistance  Stand to sit: Stand by assistance  Bed to Chair: Stand by assistance, Contact guard assistance  Stand Pivot Transfers: Contact guard assistance (without ad), Ambulation 1  Surface: level tile  Device: Rollator  Assistance: Stand by assistance  Quality of Gait: NBOS, no lob, kyphotic posture with cues for correction and little carryover  Distance: 150 feet  Comments: needed seated rest break after 80 feet. poor approach to sit and wanted to lay down instead of continuing her walk. , Stairs  # Steps : 4  Stairs Height: 6\"  Rails: Bilateral  Assistance: Stand by assistance  Comment: Encouragment to complete, patient appears confused, oncse started patient demo's Reciprocol pattern    FIMS: Bed, Chair, Wheel Chair: 4 - Requires steadying assistance only <25% assist  and/or requires assist with one leg only  Walk: 4 - Contact Guard/Minimal Assistance Requires up to Contact Guard or Minimal Assistance to walk/operate wheelchair at least 150 feet  Distance Walked: 200'  Wheel Chair: 0 - Activity Not Assessed/Does Not Occur  Distance Traveled in Wheel Chair: 0  Stairs: 2- Maximal Assistance Performs 25-49% of the effort to go up and down 4 to 6 stairs and requires the assistance of one person only,  , Assessment: Patient transfers from bed to chair with safe technique. Patient demonstrates poor carryover with instruction, requires assist to complete tasks.      Occupational therapy: FIMS:  Eatin - Feeds self with adaptive equipment/dentures and/or feeds self with modified diet and/or performs own tube feeding  Groomin - Did not occur  Bathin - Did not occur  Dressing-Upper: 0 - Did not occur  Dressing-Lower: 0 - Did not occur  Toiletin - Total assist  Toilet Transfer: 4 - Requires steadying assistance only < 25% assist  Shower Transfer: 0 - Activity does not occur,  , Assessment: Pt. demonstrates improved balance and endurance but continues to have deficits with strength, coordination and problem solving. Pt's safety and sequencing is significantly impaired which impacts her ability to problem solve through ADLs and IADLs. Pt. continues to benefit from skilled OT to address these deficits and improve independence and safety with ADLs. Speech therapy: FIMS: Comprehension: 2 - Patient understands basic needs 25-49% of the time  Expression: 2 - Expresses basic ideas/needs 25-49% of the time  Social Interaction: 4 - Patient appropriate 75-90%+ of the time  Problem Solvin - Patient solves simple/routine tasks 25%-49%  Memory: 2 - Patient remembers 25%-49% of the time      Lab/X-ray studies reviewed, analyzed and discussed with patient and staff:   No results found for this or any previous visit (from the past 24 hour(s)). Previous extensive, complex labs, notes and diagnostics reviewed and analyzed. ALLERGIES:    Allergies as of 2018    (No Known Allergies)      (please also verify by checking [facility])     Complex Physical Medicine & Rehab Issues Assess & Plan:   1. 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 Functional and medical status reassessed regarding patients ability to participate in therapies and patient found to be able to participate in acute intensive comprehensive inpatient rehabilitation program including PT/OT to improve balance, ambulation, ADLs, and to improve the P/AROM.   Therapeutic modifications regarding  and speech therapist as well as a rolling walker Weekly team meeting  Thursday to assess progress towards goals, discuss and address social, psychological and medical comorbidities and to address difficulties they may be having progressing in therapy. Patient and family education is in progress. The patient is to follow-up with their family physician after discharge. Complex Active General Medical Issues that complicate care Assess & Plan:        1.  acute likely aspiration related Pneumonia of both lungs due to infectious organism-patient is now on Augmentin every 12 hours for 5 days first dose was June 25, 2018, prevent  aspiration  -feeding only with assist up in chair with modified diet and consult speech and language pathology-she is on Augmentin  2. Severe protein-calorie malnutrition-dietary consult add nutritional supplementation protein and vitamin B-12  3. Complete heart block history with recent pacemaker placement,  HTN (hypertension), MI (myocardial infarction), History of PTCA with stents-vital signs checks every shift, monitor for chest pain, dose and titrate Norvasc, Apresoline, Plavix, Hydrodiuril, Cozaar, aspirin, Toprol-XL, Zocor-consult hospitals for backup medical-cardiology will come in to check pacer. 4.   Anemia-recheck CBC and BMP  5. [place] (hard of hearing)-speech therapy to provide listening devices as tolerated  6. Hypothyroidism-Synthroid 25 µg daily  7. Right hemiparesis, Aphasia, Dysphagia, oropharyngeal phase-consult neurology monitor neurologically  8. Incontinent of urine and stool-scheduled toileting rule out UTI was stat UA catheterize special then  9. Severe skin tears bilateral upper extremities -sacral redness at side to side turns in a crate mattress as well as E T-max. 10. Severe thrush- increase cinnamon oil  11. Oropharyngeal dysphasia-dysphasia to mechanically altered foods no drinking straws consults recently which pathology  12.  Severe

## 2018-07-04 NOTE — PROGRESS NOTES
Occupational Therapy  Facility/Department: [facility]  Daily Treatment Note  NAME: Felix Barthel  : 1933  MRN: [id number]    Date of Service: 2018    Discharge Recommendations:  Continue to assess pending progress       Patient Diagnosis(es): There were no encounter diagnoses. has a past medical history of HTN (hypertension) and Hypothyroidism. has no past surgical history on file. Restrictions  Restrictions/Precautions  Restrictions/Precautions: Modified Diet, Fall Risk  Position Activity Restriction  Other position/activity restrictions: Pacemaker precautions  Subjective   General  Chart Reviewed: Yes  Patient assessed for rehabilitation services?: Yes  Response to previous treatment: Patient with no complaints from previous session  Family / Caregiver Present: No  Referring Practitioner: Dr. Yuni Jasmine  Diagnosis: Right hemiparesis secondary to left cerebral infarct   Pre Treatment Pain Screening  Pain at present: 0  Scale Used: Numeric Score  Intervention List: Patient able to continue with treatment  Pain Assessment  Patient Currently in Pain: No  Pain Assessment: 0-10  Pain Level: 0  Vital Signs  Patient Currently in Pain: No   Orientation     Objective    Pt. engaged in seated endurance and coordination tasks to promote increased independence with self-care fasteners and ADLs. Pt. was able to fold 8 bath towels with max increased time and encouragement. Pt. requires towels to be handed to her one at a time and had mod difficulty sequencing steps to complete. Pt. then was able to assemble 8 large nuts/bolts. Pt. had max difficulty sequencing to correct errors with verbal cues. Pt. did not attempt each pair of nuts/bolts until therapist cued her. Pt. required encouragement and verbal cuing throughout entire OT tx session to engage in tasks. Pt. more easily distracted this PM as well. Assessment   Assessment: Pt. continues to require step by step cues for initiation and problem solving.

## 2018-07-04 NOTE — PROGRESS NOTES
with L hand. Pt. was able to remove marbles with L hand with mod difficulty and golf tees with R hand with no difficulty, but min cues to attend to task. Pt. completed problem solving activity, placing wooden puzzle pieces from dowel with mod difficulty with increased time. Pt. has decreased processing and requires cues and encouragement to complete. Pt. also completed cognitive activity with max increased time, assembling foam butterfly puzzle with mod difficulty. Pt. would force pieces together which did not fit well and did not notice or correct errors without assist. Pt. was able to ultimately complete puzzle with increased cues needed as she continued. Pt. returned to bed at end of session with SBA and verbal cuing. Pt. was able to perform bed mobility without assist.  Assessment   Assessment: Pt. demonstrated decreased endurance and problem solving today. Pt. continues to benefit from skilled OT to improve coordination, sequencing, problem solving and memory to improve safety and independence with ADLs and IADLs. Activity Tolerance  Activity Tolerance: Patient limited by fatigue  Activity Tolerance: Pt. required significant encouragement and frequently asked to return to bed. Safety Devices  Safety Devices in place: Yes  Type of devices: All fall risk precautions in place          Plan   Plan  Times per week:  minutes per day, 5-7 days per week  Plan weeks: 2-3 weeks   Current Treatment Recommendations: Strengthening, ROM, Balance Training, Functional Mobility Training, Endurance Training, Neuromuscular Re-education, Cognitive Reorientation, Safety Education & Training, Patient/Caregiver Education & Training, Equipment Evaluation, Education, & procurement, Self-Care / ADL, Home Management Training, Cognitive/Perceptual Training  Plan Comment: Continue per OT POC   G-Code     OutComes Score  AM-PAC Score  Goals  Patient Goals   Patient goals : \"Take care of myself. \"        Therapy Time

## 2018-07-04 NOTE — PROGRESS NOTES
finger-nose-finger was intact without dysmetria. GAIT: Gait was not testable  Like to be in a fetal position      Medications:    amLODIPine  10 mg Oral Daily    levothyroxine  25 mcg Oral Daily    hydrALAZINE  100 mg Oral 3 times per day    clopidogrel  75 mg Oral Daily    aspirin  81 mg Oral Daily    sodium chloride  250 mL Intravenous Once    losartan  50 mg Oral Daily    metoprolol succinate  25 mg Oral Daily    hydrochlorothiazide  25 mg Oral Daily    vitamin D  5,000 Units Oral Daily    piperacillin-tazobactam  3.375 g Intravenous Q8H    sodium chloride flush  10 mL Intravenous 2 times per day    azithromycin  500 mg Intravenous Q24H    sodium chloride  500 mL Intra-arterial Once    docusate sodium  100 mg Oral BID    simvastatin  20 mg Oral Nightly     Continuous Infusions:    PRN Meds:sodium chloride flush, oxyCODONE-acetaminophen, sodium chloride flush, hydrALAZINE, acetaminophen, magnesium hydroxide, ondansetron    LABS  CBC:   Recent Labs      06/21/18   2307  06/22/18   0512  06/24/18   0502   WBC  7.5  8.2  10.6   HGB  11.0*  10.8*  10.4*   PLT  237  243  303     BMP:    Recent Labs      06/22/18   0512  06/23/18   0458  06/24/18   0502   NA  132  132  132   K  4.3  4.2  5.1   CL  101  98  94*   CO2  20*  19*  24   BUN  21  16  21   CREATININE  0.63  0.62  0.74   GLUCOSE  88  105  118*     TSH:    No results for input(s): TSH in the last 72 hours. B12:    No results for input(s): Hilda Nipper in the last 72 hours. Vit. D:   No results for input(s): VITD25 in the last 72 hours. Lipids: No results found for: CHOL  No results found for: TRIG  No results found for: HDL  No results found for: LDLCHOLESTEROL, LDLCALC  No results found for: LABVLDL, VLDL  No results found for: CHOLHDLRATIO    Ammonia:No results for input(s): AMMONIA in the last 72 hours.   LFT:   Recent Labs      06/24/18   0502   AST  20   ALT  9   BILITOT  0.5   ALKPHOS  74        Urine:   No results for input(s):

## 2018-07-05 LAB
BASOPHILS ABSOLUTE: 0.1 K/UL (ref 0–0.2)
BASOPHILS RELATIVE PERCENT: 0.9 %
EOSINOPHILS ABSOLUTE: 0.1 K/UL (ref 0–0.7)
EOSINOPHILS RELATIVE PERCENT: 1 %
HCT VFR BLD CALC: 25.2 % (ref 37–47)
HEMOGLOBIN: 8.6 G/DL (ref 12–16)
LYMPHOCYTES ABSOLUTE: 1 K/UL (ref 1–4.8)
LYMPHOCYTES RELATIVE PERCENT: 14.9 %
MCH RBC QN AUTO: 30.1 PG (ref 27–31.3)
MCHC RBC AUTO-ENTMCNC: 34.3 % (ref 33–37)
MCV RBC AUTO: 87.7 FL (ref 82–100)
MONOCYTES ABSOLUTE: 0.7 K/UL (ref 0.2–0.8)
MONOCYTES RELATIVE PERCENT: 10.9 %
NEUTROPHILS ABSOLUTE: 4.9 K/UL (ref 1.4–6.5)
NEUTROPHILS RELATIVE PERCENT: 72.3 %
PDW BLD-RTO: 17.6 % (ref 11.5–14.5)
PLATELET # BLD: 381 K/UL (ref 130–400)
RBC # BLD: 2.87 M/UL (ref 4.2–5.4)
WBC # BLD: 6.7 K/UL (ref 4.8–10.8)

## 2018-07-05 PROCEDURE — 97116 GAIT TRAINING THERAPY: CPT

## 2018-07-05 PROCEDURE — 97112 NEUROMUSCULAR REEDUCATION: CPT

## 2018-07-05 PROCEDURE — 1180000000 HC REHAB R&B

## 2018-07-05 PROCEDURE — 36415 COLL VENOUS BLD VENIPUNCTURE: CPT

## 2018-07-05 PROCEDURE — 99233 SBSQ HOSP IP/OBS HIGH 50: CPT | Performed by: PHYSICAL MEDICINE & REHABILITATION

## 2018-07-05 PROCEDURE — 85025 COMPLETE CBC W/AUTO DIFF WBC: CPT

## 2018-07-05 PROCEDURE — 97530 THERAPEUTIC ACTIVITIES: CPT

## 2018-07-05 PROCEDURE — 97127 HC SP THER IVNTJ W/FOCUS COG FUNCJ: CPT

## 2018-07-05 PROCEDURE — 6370000000 HC RX 637 (ALT 250 FOR IP): Performed by: PHYSICAL MEDICINE & REHABILITATION

## 2018-07-05 PROCEDURE — 97535 SELF CARE MNGMENT TRAINING: CPT

## 2018-07-05 PROCEDURE — 97127 HC OT THER IVNTJ W/FOCUS COG FUNCJ: CPT

## 2018-07-05 PROCEDURE — 6370000000 HC RX 637 (ALT 250 FOR IP): Performed by: INTERNAL MEDICINE

## 2018-07-05 PROCEDURE — 92526 ORAL FUNCTION THERAPY: CPT

## 2018-07-05 RX ADMIN — CLOPIDOGREL BISULFATE 75 MG: 75 TABLET, FILM COATED ORAL at 08:19

## 2018-07-05 RX ADMIN — Medication 100 MG: at 01:00

## 2018-07-05 RX ADMIN — HYDRALAZINE HYDROCHLORIDE 100 MG: 50 TABLET, FILM COATED ORAL at 16:39

## 2018-07-05 RX ADMIN — Medication 125 MG: at 23:53

## 2018-07-05 RX ADMIN — KETOCONAZOLE: 20 SHAMPOO TOPICAL at 08:23

## 2018-07-05 RX ADMIN — METOPROLOL SUCCINATE 25 MG: 25 TABLET, EXTENDED RELEASE ORAL at 08:20

## 2018-07-05 RX ADMIN — HYDRALAZINE HYDROCHLORIDE 100 MG: 50 TABLET, FILM COATED ORAL at 20:41

## 2018-07-05 RX ADMIN — Medication 5000 UNITS: at 08:18

## 2018-07-05 RX ADMIN — Medication 100 MG: at 16:38

## 2018-07-05 RX ADMIN — LOSARTAN POTASSIUM 50 MG: 50 TABLET, FILM COATED ORAL at 08:19

## 2018-07-05 RX ADMIN — Medication 125 MG: at 16:47

## 2018-07-05 RX ADMIN — BACITRACIN ZINC 1 G: 500 OINTMENT TOPICAL at 20:42

## 2018-07-05 RX ADMIN — HYDRALAZINE HYDROCHLORIDE 100 MG: 50 TABLET, FILM COATED ORAL at 05:18

## 2018-07-05 RX ADMIN — DOCUSATE SODIUM 100 MG: 100 CAPSULE, LIQUID FILLED ORAL at 08:20

## 2018-07-05 RX ADMIN — Medication: at 20:44

## 2018-07-05 RX ADMIN — SIMVASTATIN 20 MG: 20 TABLET, FILM COATED ORAL at 20:42

## 2018-07-05 RX ADMIN — ASPIRIN 81 MG 81 MG: 81 TABLET ORAL at 08:20

## 2018-07-05 RX ADMIN — Medication 1 DROP: at 16:50

## 2018-07-05 RX ADMIN — HYDROCHLOROTHIAZIDE 25 MG: 25 TABLET ORAL at 08:20

## 2018-07-05 RX ADMIN — Medication 1 DROP: at 20:43

## 2018-07-05 RX ADMIN — BACITRACIN ZINC 1 G: 500 OINTMENT TOPICAL at 08:18

## 2018-07-05 RX ADMIN — LEVOTHYROXINE SODIUM 25 MCG: 25 TABLET ORAL at 05:18

## 2018-07-05 RX ADMIN — Medication: at 16:50

## 2018-07-05 RX ADMIN — AMLODIPINE BESYLATE 10 MG: 10 TABLET ORAL at 08:22

## 2018-07-05 ASSESSMENT — PAIN SCALES - GENERAL
PAINLEVEL_OUTOF10: 0

## 2018-07-05 NOTE — PROGRESS NOTES
but poor path finding skills. Distance: 225', 150'  Stairs/Curb  Stairs?: No   Stairs  # Steps : 4  Stairs Height: 6\"  Rails: Bilateral  Assistance: Stand by assistance  Comment: Reciprocal ascending and descending. Pt declined >4 steps secondary \"I'm afraid I'll fall. \"          ASSESSMENT/COMMENTS:  Pt with ability to perform standing activities without support, but unable to stand static without activity. Pt demonstrated improved ability to walk short distances without AD with CGA only without HHA. PLAN OF CARE/Safety:   Safety Devices  Type of devices: All fall risk precautions in place; Chair alarm in place;Gait belt      Therapy Time:   Individual   Time In 1430   Time Out 1500   Minutes 30     Minutes:30      Transfer/Bed mobility training: 3      Gait trainin      Neuro re education: 15     Therapeutic ex:      Leonardo Oh PTA, 18 at 2:55 PM

## 2018-07-05 NOTE — PROGRESS NOTES
Therapy called to let me know that patient was incontinent of stool during shower time. Stool was brown and soft, slightly formed. Therapist applied barrier cream to mahsa area, in which was reddened. Therapist encouraged placing patient on toilet every 2 hours. Will continue to monitor to see when c-diff protocol can be initiated. Will make sure to pass the message along to the next shift.

## 2018-07-05 NOTE — PROGRESS NOTES
Occupational Therapy  Facility/Department: [facility]  Daily Treatment Note  NAME: Lanny Silva  : 1933  MRN: [id number]    Date of Service: 2018    Discharge Recommendations:  Continue to assess pending progress       Patient Diagnosis(es): There were no encounter diagnoses. has a past medical history of HTN (hypertension) and Hypothyroidism. has no past surgical history on file. Restrictions  Restrictions/Precautions  Restrictions/Precautions: Modified Diet, Fall Risk  Position Activity Restriction  Other position/activity restrictions: Pacemaker precautions  Subjective   General  Chart Reviewed: Yes  Patient assessed for rehabilitation services?: Yes  Response to previous treatment: Patient with no complaints from previous session  Family / Caregiver Present: No  Referring Practitioner: Dr. Keyana Kenny  Diagnosis: Right hemiparesis secondary to left cerebral infarct   Pre Treatment Pain Screening  Pain at present: 0  Scale Used: Numeric Score  Intervention List: Patient able to continue with treatment  Pain Assessment  Patient Currently in Pain: No  Pain Assessment: 0-10  Pain Level: 0  Vital Signs  Patient Currently in Pain: No   Orientation     Objective      Pt. initiated repetitive reaching task with significantly fewer cues this PM. Pt. was able to place small wooden discs onto dowels after only one cue and independently ensured that all discs faced the same direction (Black side up) without cuing to do so. Therapist then intended to engage in standing task. When pt. stood, it became obvious that she was heavily soiled and her pants were wet through. Pt. and therapist returned to room and toileted patient again; pt. showered as below to clean mahsa area. Pt. demo'd improved sequencing during bathing tasks this PM. Changed all of patient's clothes and notified ELAINE Seymour of soft stool and pt's need to be toileted frequently 2° pt. will always say 'no' when asked if she needs to toilet.  RN also

## 2018-07-05 NOTE — PROGRESS NOTES
Subjective: The patient complains of severe  acute  right-sided weakness and aphasia and dysphagia partially relieved by  PT, OT, speech pathology and exacerbated by  overexertion and recent left cerebral infarcts. I am concerned about patients progressive depression and chronic pain issues as well as her need for Percocet for her back and neck pain. She is less impulsive however occasionally she is getting a straw on her tray. She still needs set up and assist for feeds just because of her initiation. Otherwise she can feed herself. She was having some loose stools I discontinued her Colace and ordered a stat C. diff check. ROS x10: The patient also complains of severely impaired mobility and activities of daily living. Otherwise no new problems with vision, hearing, nose, mouth, throat, dermal, cardiovascular, GI, , pulmonary, musculoskeletal, psychiatric or neurological. See Rehab H&P on Rehab chart dated . Vital signs:  BP (!) 149/62   Pulse 74   Temp 98 °F (36.7 °C) (Oral)   Resp 16   SpO2 96%   I/O:   PO/Intake:  fair PO intake,  dysphagia 2 with one-on-one supervision assist for feeds    Bowel/Bladder:   Incontinent of bowel and bladder-occasional loose stools  General:  Patient is  frail and cachectic, adequately nourished, non-obese and     well kempt. HEENT:    PERRLA, hearing intact to loud voice, external inspection of ear     and nose benign. Inspection of lips, tongue and gums-severe thrush  Musculoskeletal: No significant change in strength or tone. All joints stable. Inspection and palpation of digits and nails show no clubbing,       cyanosis or inflammatory conditions. Neuro/Psychiatric: Affect: flat but pleasant. Alert and oriented to person, place and     situation. No significant change in deep tendon reflexes or     Sensation-right hemiparesis secondary CVA  Lungs:  Diminished but CTA-B.  Respiration effort is normal at rest.     Heart:   S1 = S2, Allergies as of 06/25/2018    (No Known Allergies)      (please also verify by checking [facility])    Today I evaluated this patient for periodic reassessment of medical and functional status. The patient was discussed in detail at the treatment team meeting focusing on current medical issues, progress in therapies, social issues, psychological issues, barriers to progress and strategies to address these barriers, and discharge planning. See the hand written addendum to rehab progress note. The patient continues to be high risk for future disability and their medical and rehabilitation prognosis continue to be good and therefore, we will continue the patient's rehabilitation course as planned. The patient's tentative discharge date was set. Patient and family education was discussed. The patient was made aware of the team discussion regarding their progress. Complex Physical Medicine & Rehab Issues Assess & Plan:   1. 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 Functional and medical status reassessed regarding patients ability to participate in therapies and patient found to be able to participate in acute intensive comprehensive inpatient rehabilitation program including PT/OT to improve balance, ambulation, ADLs, and to improve the P/AROM. Therapeutic modifications regarding activities in therapies, place, amount of time per day and intensity of therapy made daily. In bed therapies or bedside therapies prn.   2. Bowel loose stools and Bladder dysfunction-bowel and Urinary incontinence:  frequent toileting, ambulate to bathroom with assistance, check post void residuals. Check for C.difficile x1 if >2 loose stools in 24 hours, continue bowel & bladder program.  Monitor bowel and bladder function. Lactinex 2 PO every AC.    Hold any and all stool softeners Scheduled

## 2018-07-05 NOTE — PROGRESS NOTES
Occupational Therapy  Facility/Department: [facility]  Daily Treatment Note  NAME: Jah Decker  : 1933  MRN: [id number]    Date of Service: 2018    Discharge Recommendations:  Continue to assess pending progress       Patient Diagnosis(es): There were no encounter diagnoses. has a past medical history of HTN (hypertension) and Hypothyroidism. has no past surgical history on file. Restrictions  Restrictions/Precautions  Restrictions/Precautions: Modified Diet, Fall Risk  Position Activity Restriction  Other position/activity restrictions: Pacemaker precautions  Subjective   General  Chart Reviewed: Yes  Patient assessed for rehabilitation services?: Yes  Response to previous treatment: Patient with no complaints from previous session  Family / Caregiver Present: No  Referring Practitioner: Dr. Danuta Castillo  Diagnosis: Right hemiparesis secondary to left cerebral infarct   Pre Treatment Pain Screening  Pain at present: 0  Scale Used: Numeric Score  Intervention List: Patient able to continue with treatment  Pain Assessment  Patient Currently in Pain: Denies  Pain Assessment: 0-10  Pain Level: 0  Vital Signs  Patient Currently in Pain: Denies   Orientation     Objective  Pt worked on a cognitive task of counting money with Min A and Mod cues. Pt was not able to correctly count coins. Pt performed table top arm bike exercise to increase B UE strength for 1 min x 2. Pt worked on dynamic standing at table while folding clothes with impulsively sitting when fatigue usually after about one minute. Pt also performed task of opening and closing of containers with confusion when opening bottles with pushing down and turning medicine mechanisms. Pt worked on doffing hospital socks and donning regular socks and her tennis shoes. Pt had some difficulty on donning shoes d/t adjusting heel and two attempts initally to tie a bow on her shoe then she was able to tie her shoes. Assessment      Activity Tolerance  Activity Tolerance: Patient limited by fatigue (Pt reports not sleeping well last night.)          Plan   Plan  Times per week:  minutes per day, 5-7 days per week  Plan weeks: 2-3 weeks   Current Treatment Recommendations: Strengthening, ROM, Balance Training, Functional Mobility Training, Endurance Training, Neuromuscular Re-education, Cognitive Reorientation, Safety Education & Training, Patient/Caregiver Education & Training, Equipment Evaluation, Education, & procurement, Self-Care / ADL, Home Management Training, Cognitive/Perceptual Training  Plan Comment: Continue per OT POC   G-Code     OutComes Score                                           AM-PAC Score             Goals  Patient Goals   Patient goals : \"Take care of myself. \"        Therapy Time   Individual Concurrent Group Co-treatment   Time In 1000         Time Out 1100         Minutes Via Parasol TherapeuticsALLISON delgado Electronically signed by ALLISON Stein on 7/5/2018 at 3:18 PM

## 2018-07-05 NOTE — PROGRESS NOTES
needed;  [x] patient   [] family    Safety Devices:  [x] Call light within reach  [x] Chair alarm activated  [] Bed alarm activated  [x] Other: [employer] in room    Therapist: Electronically signed by EMMANUELLE Valdez on 7/5/2018 at 12:03 PM

## 2018-07-05 NOTE — PROGRESS NOTES
Physical Therapy Rehab Treatment Note  Facility/Department: [facility]  Room: [email or web address]R251-01       NAME: Ivett Moy  : 1933 (80 y.o.)  MRN: [id number]  CODE STATUS: Limited    Date of Service: 2018  Chart Reviewed: Yes  Family / Caregiver Present: No  General Comment  Comments: Pt continues to ask for certain people, even when reoriented. Restrictions:  Restrictions/Precautions: Modified Diet, Fall Risk  Position Activity Restriction  Other position/activity restrictions: Pacemaker precautions       SUBJECTIVE: Subjective: I feel good. Response To Previous Treatment: Patient with no complaints from previous session. Pain Screening  Patient Currently in Pain: Denies  Pre Treatment Pain Screening  Pain at present: 0  Scale Used: Numeric Score  Intervention List: Patient able to continue with treatment           OBJECTIVE:   Overall Orientation Status: Impaired  Orientation Level: Disoriented to time;Disoriented to situation;Oriented to place;Oriented to person           Balance  Posture: Good  Sitting - Static: Good  Sitting - Dynamic: Fair  Standing - Static: Poor;+ (needs support)  Standing - Dynamic: Poor;+ (needs support)  Neuromuscular Education  NDT Treatment: Gait ;Standing (Gait drills with HHA lateral and retro with mild retro pulsive lean; standing with table for 5 minutes for activity. Pt without LOB as long as has one UE support.)           Transfers  Sit to Stand: Supervision  Stand to sit: Stand by assistance  Bed to Chair: Stand by assistance    Ambulation  Ambulation?: Yes  Ambulation 1  Surface: level tile;carpet  Device: Rollator  Assistance: Supervision  Quality of Gait: Fair posture, reciprocal gait with decreased step length. No LOB. Able to change direction without difficulty but poor path finding skills.     Distance: 175'  Stairs/Curb  Stairs?: Yes   Stairs  # Steps : 4  Stairs Height: 6\"  Rails: Bilateral  Assistance: Stand by assistance  Comment: Reciprocal ascending and descending. Pt declined >4 steps secondary \"I'm afraid I'll fall. \"      ASSESSMENT/COMMENTS:  Pt with improved safety with sit to stand, but inconsistent with stand to sit or approach to chair. Pt unable to stand static without support unless given activity. Pt with poor ability to follow simple tasks and needs frequent cues. PLAN OF CARE/Safety:   Safety Devices  Type of devices: All fall risk precautions in place; Chair alarm in place;Gait belt      Therapy Time:   Individual   Time In 0900   Time Out 0930   Minutes 30     Minutes:30      Transfer/Bed mobility training:      Gait trainin      Neuro re education: 10     Therapeutic ex:      Jason Damico PTA, 18 at 11:11 AM

## 2018-07-05 NOTE — PROGRESS NOTES
Patient resting comfortably in bed. Patient denies pain at this time. Patient assisted with cueing during dinner time. Will continue to monitor.

## 2018-07-05 NOTE — ANESTHESIA POST-OP
Assumed care of pt at [address] [place] [address]. PCA notified RN that pt's left hand was bleeding. Dressing that was on had come off. Small pinpoint open area between thumb and index finger. Area was bleeding. Area cleansed and redressed with folded 4x4 and sully dressing. Bleeding stopped and no more bleeding was noted. Will monitor on rounds. Pt was incontinent of urine tonight.  VMORRIS RN

## 2018-07-06 PROCEDURE — 97530 THERAPEUTIC ACTIVITIES: CPT

## 2018-07-06 PROCEDURE — 99232 SBSQ HOSP IP/OBS MODERATE 35: CPT | Performed by: PHYSICAL MEDICINE & REHABILITATION

## 2018-07-06 PROCEDURE — 97127 HC OT THER IVNTJ W/FOCUS COG FUNCJ: CPT

## 2018-07-06 PROCEDURE — 97535 SELF CARE MNGMENT TRAINING: CPT

## 2018-07-06 PROCEDURE — 97110 THERAPEUTIC EXERCISES: CPT

## 2018-07-06 PROCEDURE — 1180000000 HC REHAB R&B

## 2018-07-06 PROCEDURE — 97116 GAIT TRAINING THERAPY: CPT

## 2018-07-06 PROCEDURE — 6370000000 HC RX 637 (ALT 250 FOR IP): Performed by: INTERNAL MEDICINE

## 2018-07-06 PROCEDURE — 6370000000 HC RX 637 (ALT 250 FOR IP): Performed by: PHYSICAL MEDICINE & REHABILITATION

## 2018-07-06 RX ORDER — FLUCONAZOLE 100 MG/1
200 TABLET ORAL DAILY
Status: DISCONTINUED | OUTPATIENT
Start: 2018-07-06 | End: 2018-07-06

## 2018-07-06 RX ADMIN — Medication 125 MG: at 05:44

## 2018-07-06 RX ADMIN — BACITRACIN ZINC 1 G: 500 OINTMENT TOPICAL at 22:10

## 2018-07-06 RX ADMIN — ASPIRIN 81 MG 81 MG: 81 TABLET ORAL at 08:40

## 2018-07-06 RX ADMIN — CLOPIDOGREL BISULFATE 75 MG: 75 TABLET, FILM COATED ORAL at 08:40

## 2018-07-06 RX ADMIN — HYDRALAZINE HYDROCHLORIDE 100 MG: 50 TABLET, FILM COATED ORAL at 05:43

## 2018-07-06 RX ADMIN — Medication 5000 UNITS: at 08:41

## 2018-07-06 RX ADMIN — Medication: at 22:15

## 2018-07-06 RX ADMIN — LOSARTAN POTASSIUM 50 MG: 50 TABLET, FILM COATED ORAL at 08:40

## 2018-07-06 RX ADMIN — Medication: at 05:43

## 2018-07-06 RX ADMIN — HYDRALAZINE HYDROCHLORIDE 100 MG: 50 TABLET, FILM COATED ORAL at 22:09

## 2018-07-06 RX ADMIN — BACITRACIN ZINC 1 G: 500 OINTMENT TOPICAL at 08:40

## 2018-07-06 RX ADMIN — METOPROLOL SUCCINATE 25 MG: 25 TABLET, EXTENDED RELEASE ORAL at 08:41

## 2018-07-06 RX ADMIN — HYDROCHLOROTHIAZIDE 25 MG: 25 TABLET ORAL at 08:40

## 2018-07-06 RX ADMIN — Medication 100 MG: at 08:39

## 2018-07-06 RX ADMIN — AMLODIPINE BESYLATE 10 MG: 10 TABLET ORAL at 08:40

## 2018-07-06 RX ADMIN — SIMVASTATIN 20 MG: 20 TABLET, FILM COATED ORAL at 22:10

## 2018-07-06 RX ADMIN — LEVOTHYROXINE SODIUM 25 MCG: 25 TABLET ORAL at 05:43

## 2018-07-06 RX ADMIN — KETOCONAZOLE: 20 SHAMPOO TOPICAL at 08:41

## 2018-07-06 RX ADMIN — HYDRALAZINE HYDROCHLORIDE 100 MG: 50 TABLET, FILM COATED ORAL at 15:06

## 2018-07-06 RX ADMIN — Medication 100 MG: at 12:19

## 2018-07-06 ASSESSMENT — PAIN SCALES - GENERAL
PAINLEVEL_OUTOF10: 0

## 2018-07-06 NOTE — PROGRESS NOTES
Physical Therapy Rehab Treatment Note  [facility]Department[facility]  [facility]       NAME: Domenico Garnett  : 1933 (80 y.o.)  MRN: [id number]  CODE STATUS: Limited    Date of Service: 2018  Chart Reviewed: Yes  Family / Caregiver Present: No    Restrictions:  Restrictions/Precautions: Modified Diet, Fall Risk  Position Activity Restriction  Other position/activity restrictions: Pacemaker precautions       SUBJECTIVE: Subjective: Are they bringing me my pills? Response To Previous Treatment: Patient with no complaints from previous session. Pain Screening  Patient Currently in Pain: Denies  Pre Treatment Pain Screening  Pain at present: 0  Scale Used: Numeric Score  Intervention List: Patient able to continue with treatment           OBJECTIVE:   Orientation Level: Disoriented to situation;Disoriented to time;Disoriented to place      Bed mobility  Bridging: Independent  Rolling to Left: Independent  Rolling to Right: Independent  Supine to Sit: Independent  Sit to Supine: Independent  Scooting: Independent    Transfers  Sit to Stand: Supervision  Stand to sit: Supervision;Stand by assistance  Bed to Chair: Supervision  Car Transfer: Stand by assistance    Ambulation  Ambulation?: Yes  More Ambulation?: Yes  Ambulation 1  Surface: level tile;carpet  Device: Rollator  Assistance: Supervision  Quality of Gait: Fair posture, reciprocal gait with decreased step length. No LOB. Able to change direction without difficulty but poor path finding skills. Distance: 200'  Ambulation 2  Surface - 2: carpet  Device 2: No device  Assistance 2: Contact guard assistance;Minimal assistance  Quality of Gait 2: Decreased arm swing, decreased step length, decrease balance with fatigue (which occurs quickly) after approximately 20 to 25'.     Distance: 40' x 2   Stairs  # Steps : 4  Stairs Height: 6\"  Rails: Bilateral  Assistance: Supervision  Comment: Reciprocal ascending and descending    ASSESSMENT/COMMENTS:

## 2018-07-06 NOTE — PROGRESS NOTES
(36.7 °C) (Oral)   Resp 18   SpO2 97%        CONSTITUTIONAL:  awake, alert, cooperative, no apparent distress, mildly confused   ENT:  Normocephalic, without obvious abnormality, atraumatic, sinuses nontender on palpation, external ears without lesions,  NECK:  Supple, symmetrical, trachea midline, no adenopathy, thyroid symmetric, not enlarged and no tenderness, skin normal  LUNGS:  No increased work of breathing, good air exchange, bilateral crackles or wheezing.  PPM left Chest ( some ouzing post op )  CARDIOVASCULAR:  Normal apical impulse, regular rate and rhythm, normal S1 and S2,  and 1/6 murmur noted  ABDOMEN:  Normal bowel sounds, soft, non-distended, non-tender, no masses palpated, no hepatosplenomegally  EXTREMITIES:  No edema, Pulses strong throughout. Left Arm no edema. NEUROLOGIC:  Awake, alert, not oriented. Following all commands and moving all extremties  SKIN:  skin bruising but improved according to nursing staff  PACER SITE: stable     Data:        LABS:    No results found for this or any previous visit (from the past 24 hour(s)). EKG:  Atrial-sensed ventricular-paced rhythm  Abnormal ECG     Echo:    Left ventricular ejection fraction is visually estimated at 60%.  Mild concentric left ventricular hypertrophy.  Vernelle Rile is DUST (discrete upper septal thickening) without evidence of   outflow tract obstruction.   Impaired relaxation compatible with diastolic dysfunction. ( reversed E/A   ratio)   Abnormal diastolic dysfunction by tissue doppler index.   Negative bubble study for significant ASD or PFO   No previous exam to compare   Moderate (2+) mitral regurgitation is present.   Some diastolic mitral regurgitation   Thickened aortic valve leaflets noted.  Heavily calcified   There is severe fibrocalcific sclerosis of the aortic valve with evidence   of at least mild aortic stenosis with mean gradient across the aortic   valve of approximately mmHg and calculated aortic valve area was 2.2   square centimeters. However, 2-D imaging is suggestive of more mild to   moderate aortic stenosis.   all suggesting mild to moderate aortic stenosis.   Mild-to-moderate aortic regurgitation is noted.(1-2+)   Pressure half-time 1014 msec .   There is mild to moderate ( 2 +) tricuspid regurgitation with estimated   RVSP of 54 mm Hg.--moderate pulmonary      CATH 6/17/18:  See Report  1V CAD RCA, S/P ED   Normal LV Function, 50%  TPM placed.     PPM: 6/19/18  MRI compatible dual chamber generator Medtronic Berta XT DR MRI;  Model number [id number], serial number [id number].             Julia Mcelroy MD ,[facility] Cardiology

## 2018-07-06 NOTE — PROGRESS NOTES
Progress Note  NEUROLOGY  [facility] 1W Telemetry       Patient: Yony Franz  Unit/Bed: X594/Q570-93  YOB: 1933  MRN: [id number]  Acct: [de-identified]   Admitting Diagnosis: Stroke-like symptom [R29.90]  Stroke-like symptom [R29.90]  Admit Date:  6/26/2018  Hospital Day: 2  Limited consilt    Subjective:   Pt seen, chart reviewed  Discussed with nurse  More wakeful, still confused, but more alert today, interacting with the family better  Verbalizing more   Needs a lot of coaxing to eat and drink  Patient to resume aspirin and Plavix once awaiting issues are resolved probably tomorrow  Needed blood transfusion for the anemia  Small new stroke on the CT scan the brain  Anticoagulation cannot be started, we shall watch the patient  Speech is more clear  No nausea vomiting  Patient Seen, Chart, Labs, Radiology studies, and Consults reviewed. Patient moving all extremities are  Had a pacemaker implanted without any problem  Objective:   /69   Pulse 74   Temp 97 °F (36.1 °C) (Oral)   Resp 16   SpO2 95% '  Intake/Output Summary (Last 24 hours) at 06/24/18 2018  Last data filed at 06/24/18 1725   Gross per 24 hour   Intake             1605 ml   Output                0 ml   Net             1605 ml       Physical Exam:  GENERAL APPEARANCE: No distress, alert, interactive and cooperative. MENTAL STATE: Does not knwo the year, states we are in  knob. Not entirely clear why she is in the hospital. Answers questions appropriately. Poor attention    CRANIAL NERVES: Are normal  MOTOR:Muscle strength was 4/5 in distal and proximal muscles in both upper and lower extremities. No fasciculations, tremor or other abnormal movements were present. REFLEXES: Unchanged    SENSORY: Sensory exam was in tact to light touch, skin very fragile pin prick not tested. COORDINATION: Coordination exam was normal. In both upper extremities, finger-nose-finger was intact without dysmetria.     GAIT: Gait was not LEUKOCYTESUR in the last 72 hours.     Invalid input(s):  Rhesa Nails,  SPECGRAV,  NITRU     Assessment/Plan:    Likely her LOC was cardiac in origin  Currently not delirious, With slow improvement  We will continue to monitor her clinical progress  Can repeat CT head on  6/20 depending on patients hemodynamic stability  EEG done showed diffuse slowing  Continue supportive measures  Lab work showed elevated TSH, to watch  Vitamin D level was low we shall supplement  Anemia being washed her    On Plavix and aspirin   Patient is improving  Pt low level in fuction    Skylar Locke MD

## 2018-07-06 NOTE — PROGRESS NOTES
Subjective: The patient complains of severe  acute  right-sided weakness and aphasia and dysphagia partially relieved by  PT, OT, speech pathology and exacerbated by  overexertion and recent left cerebral infarcts. I am concerned about patients progressive depression and chronic pain issues as well as her need for Percocet for her back and neck pain. Her stool started firming back up yesterday after he took her off the Megace. I have therefore [address] is check into her off the by mouth Vanco.  If the stools start getting loose again I will go ahead and she rechecked ordered [address] just check. I discussed with team therapist did feel that she may need to stay longer than 7/15/18. ROS x10: The patient also complains of severely impaired mobility and activities of daily living. Otherwise no new problems with vision, hearing, nose, mouth, throat, dermal, cardiovascular, GI, , pulmonary, musculoskeletal, psychiatric or neurological. See Rehab H&P on Rehab chart dated . Vital signs:  BP (!) 167/71   Pulse 69   Temp 98 °F (36.7 °C) (Oral)   Resp 18   SpO2 97%   I/O:   PO/Intake:  fair PO intake,  dysphagia 2 with one-on-one supervision assist for feeds    Bowel/Bladder:   Incontinent of bowel and bladder-occasional loose stools  General:  Patient is  frail and cachectic, adequately nourished, non-obese and     well kempt. HEENT:    PERRLA, hearing intact to loud voice, external inspection of ear     and nose benign. Inspection of lips, tongue and gums-severe thrush  Musculoskeletal: No significant change in strength or tone. All joints stable. Inspection and palpation of digits and nails show no clubbing,       cyanosis or inflammatory conditions. Neuro/Psychiatric: Affect: flat but pleasant. Alert and oriented to person, place and     situation.   No significant change in deep tendon reflexes or     Sensation-right hemiparesis secondary CVA  Lungs:  Diminished but CTA-B. patient's rehabilitation course as planned. The patient's tentative discharge date was set. Patient and family education was discussed. The patient was made aware of the team discussion regarding their progress. Discharge plans were discussed along with barriers to progress and strategies to address these barriers, patient encouraged to continue to discuss discharge plans with . Complex Physical Medicine & Rehab Issues Assess & Plan:   1. 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 Functional and medical status reassessed regarding patients ability to participate in therapies and patient found to be able to participate in acute intensive comprehensive inpatient rehabilitation program including PT/OT to improve balance, ambulation, ADLs, and to improve the P/AROM. Therapeutic modifications regarding activities in therapies, place, amount of time per day and intensity of therapy made daily. In bed therapies or bedside therapies prn.   2. Bowel loose stools and Bladder dysfunction-bowel and Urinary incontinence:  frequent toileting, ambulate to bathroom with assistance, check post void residuals. Check for C.difficile x1 if >2 loose stools in 24 hours, continue bowel & bladder program.  Monitor bowel and bladder function. Lactinex 2 PO every AC. Hold any and all stool softeners Scheduled toileting add neurogenic bowel program.  3. Severe neck and low back pain generalized OA pain: reassess pain every shift and prior to and after each therapy session, give prn  Percocet or Tylenol, modalities prn in therapy, Lidoderm, K-pad prn.   4. Severe skin tears bilateral upper extremities -sacral redness at side to side turns in a crate mattress as well as E T-max. Skin healing at bilateral upper extremities, heel, buttocks and new pacer site and breakdown risk: continue pressure relief program.  At [employer] shampoo to her scalp Daily skin exams and reports from nursing. Betadine to incision  5. Severe frailty and fatigue due to Nutritional and hydration deficiency:  continue to monitor I&Os, calorie counts prn, dietary consult prn. Encourage protein supplementation vitamin B12 and CoQ10  titrate Megace held DT diarrhea, add protein supplementation add one-on-one encouragement supervision for feeds  6. Acute episodic insomnia with situational adjustment disorder:  prn Ambien, monitor for day time sedation. 7. Falls risk elevated due to impulsivity:  patient to use call light to get nursing assistance to get up, bed and chair alarm. 8. Elevated DVT risk: progressive activities in PT, continue prophylaxis MELISSA hose, elevation and  consult neurology and cardiology to find with blood thinners she can tolerate-she is currently taking aspirin and Plavix  9. Complex discharge planning:  Discharge July 15, 2018 home with daughter who works and home health care PT OT RN aide  and speech therapist as well as a rolling walker   Patient and family education is in progress. The patient is to follow-up with their family physician after discharge. Complex Active General Medical Issues that complicate care Assess & Plan:        1.  acute likely aspiration related Pneumonia of both lungs due to infectious organism-patient is now on Augmentin every 12 hours for 5 days first dose was June 25, 2018, prevent  aspiration  -feeding only with assist up in chair with modified diet and consult speech and language pathology-she is on Augmentin  2. Severe protein-calorie malnutrition-dietary consult add nutritional supplementation protein and vitamin B-12-I have discontinued her Megace seemed to have been causing diarrhea  3.    Complete heart block history with recent pacemaker placement,  HTN (hypertension), MI (myocardial infarction), History of PTCA with stents-vital signs checks every shift, monitor for chest pain, dose and titrate Norvasc, Apresoline, Plavix, Hydrodiuril, Cozaar, aspirin, Toprol-XL, Zocor-consult hospitals for backup medical-cardiology will come in to check pacer. 4.   [place] (hard of hearing)-speech therapy to provide listening devices as tolerated  5. Hypothyroidism-Synthroid 25 µg daily  6. Right hemiparesis, Aphasia, Dysphagia, oropharyngeal phase-consult neurology monitor neurologically  7. Incontinent of urine and stool-scheduled toileting rule out UTI was stat UA catheterize special then  8. Oropharyngeal dysphasia-dysphasia to mechanically altered foods no drinking straws consults recently which pathology-set up for feeds assist for feeds  9.  Severe thrush-cinnamon oil 4 times a day-add Diflucan             Mariya Goss D.O., PM&R     Attending    [address]

## 2018-07-06 NOTE — PROGRESS NOTES
Used: Grab bars  Toilet Transfer: Stand by assistance  Toilet Transfers Comments: Close SBA with verbal cues for safety. Pt. performed transfer x 4 times in order to allow therapist to perform hygiene. Cognition  Cognition Comment: Pt. engaged in cognitive tasks to improve problem solving and memory for ADLs and IADLs. Pt. was able to answer questions about her day (oriented to self, named breakfast, required cues for location, recalled her breakfast with min cues). Pt was able to sort cards by suit with max difficulty initially; pt. had significant difficulty matching suit with face cards. When face cards removed pt. was able to perform with min difficulty, primarily with difficulty with hearts vs. spades and spades vs. clubs. Pt. was 80% accurate matching cards with min verbal cues to correct errors and occasional visual cues to assist with scanning to match. Pt. required cards to be oriented the same direction for matching (i.e. with aces). Pt. was able to play  game of \"BINGO' with therapist providing step by step cues (Row G, Number 59, find row G) 80% of the time. Pt. was able to turn the ball dispenser with min verbal cues to initiate. Pt. was able to locate all numbers accurately with increased time. Pt. required extended time for all cognitive activities but demos improved sequencing and scanning this AM.         Assessment   Assessment: Pt. more aware of continence issues this AM but continues to require significant cues for problem solving and sequencing. Pt. with decreased short term memory, which impacts her ability to perform ADLs and IADLs. Pt. continues to benefit from skilled OT to improve independence with these tasks and return to safe level of function for home.           Plan   Plan  Times per week:  minutes per day, 5-7 days per week  Plan weeks: 2-3 weeks   Current Treatment Recommendations: Strengthening, ROM, Balance Training, Functional Mobility Training, Endurance Training, Neuromuscular Re-education, Cognitive Reorientation, Safety Education & Training, Patient/Caregiver Education & Training, Equipment Evaluation, Education, & procurement, Self-Care / ADL, Home Management Training, Cognitive/Perceptual Training  Plan Comment: Continue per OT POC   G-Code     OutComes Score  AM-PAC Score  Goals  Patient Goals   Patient goals : \"Take care of myself. \"        Therapy Time   Individual Concurrent Group Co-treatment   Time In 1000         Time Out 1100         Minutes [address] [employer] [facility], OTR/L Electronically signed by MERY Wright/L on 7/6/2018 at 12:00 PM

## 2018-07-06 NOTE — PROGRESS NOTES
Occupational Therapy  Facility/Department: [facility]  Daily Treatment Note  NAME: Niko Mcintyre  : 1933  MRN: [id number]    Date of Service: 2018    Discharge Recommendations:  Continue to assess pending progress       Patient Diagnosis(es): There were no encounter diagnoses. has a past medical history of HTN (hypertension) and Hypothyroidism. has no past surgical history on file. Restrictions  Restrictions/Precautions  Restrictions/Precautions: Modified Diet, Fall Risk  Position Activity Restriction  Other position/activity restrictions: Pacemaker precautions  Subjective   General  Chart Reviewed: Yes  Patient assessed for rehabilitation services?: Yes  Response to previous treatment: Patient with no complaints from previous session  Family / Caregiver Present: No  Referring Practitioner: Dr. Debra Fish  Diagnosis: Right hemiparesis secondary to left cerebral infarct   Pre Treatment Pain Screening  Pain at present: 0  Scale Used: Numeric Score  Intervention List: Patient able to continue with treatment  Pain Assessment  Patient Currently in Pain: No  Pain Assessment: 0-10  Pain Level: 0  Vital Signs  Patient Currently in Pain: No   Orientation     Objective      Pt. in bed asleep on therapist arrival. Pt. easily aroused and agreeable to therapy. Pt. was able to transfer as below with G endurance and balance. ADL  Toileting: Dependent/Total  Additional Comments: Pt. required CGA for pushing pants down and pulling pants up this PM. Pt. was continent in toilet but had been incontinent earlier as her brief was soiled. Pt. required assistance to perform hygiene for thoroughness, and therapist applied barrier cream.         Standing Balance  Sit to stand: Supervision  Stand to sit: Supervision  Toilet Transfers  Toilet - Technique: Stand step  Equipment Used: Grab bars  Toilet Transfer: Stand by assistance  Toilet Transfers Comments: SBA with verbal cues to initiate transfer.    Bed ROM, Balance Training, Functional Mobility Training, Endurance Training, Neuromuscular Re-education, Cognitive Reorientation, Safety Education & Training, Patient/Caregiver Education & Training, Equipment Evaluation, Education, & procurement, Self-Care / ADL, Home Management Training, Cognitive/Perceptual Training  Plan Comment: Continue per OT POC   G-Code     OutComes Score  AM-PAC Score  Goals  Patient Goals   Patient goals : \"Take care of myself. \"        Therapy Time   Individual Concurrent Group Co-treatment   Time In 1330         Time Out 1430         Minutes [id number] Victoria Cosme, OTR/L Electronically signed by MERY Hoang/L on 7/6/2018 at 3:22 PM

## 2018-07-07 PROCEDURE — 6370000000 HC RX 637 (ALT 250 FOR IP): Performed by: PHYSICAL MEDICINE & REHABILITATION

## 2018-07-07 PROCEDURE — 6370000000 HC RX 637 (ALT 250 FOR IP): Performed by: INTERNAL MEDICINE

## 2018-07-07 PROCEDURE — 97110 THERAPEUTIC EXERCISES: CPT

## 2018-07-07 PROCEDURE — 1180000000 HC REHAB R&B

## 2018-07-07 PROCEDURE — 97116 GAIT TRAINING THERAPY: CPT

## 2018-07-07 RX ADMIN — LEVOTHYROXINE SODIUM 25 MCG: 25 TABLET ORAL at 05:53

## 2018-07-07 RX ADMIN — Medication: at 20:25

## 2018-07-07 RX ADMIN — Medication 1 DROP: at 09:15

## 2018-07-07 RX ADMIN — LOSARTAN POTASSIUM 50 MG: 50 TABLET, FILM COATED ORAL at 09:13

## 2018-07-07 RX ADMIN — METOPROLOL SUCCINATE 25 MG: 25 TABLET, EXTENDED RELEASE ORAL at 09:09

## 2018-07-07 RX ADMIN — Medication 1 DROP: at 20:25

## 2018-07-07 RX ADMIN — HYDROCHLOROTHIAZIDE 25 MG: 25 TABLET ORAL at 09:16

## 2018-07-07 RX ADMIN — HYDRALAZINE HYDROCHLORIDE 100 MG: 50 TABLET, FILM COATED ORAL at 15:02

## 2018-07-07 RX ADMIN — SIMVASTATIN 20 MG: 20 TABLET, FILM COATED ORAL at 20:25

## 2018-07-07 RX ADMIN — Medication 100 MG: at 09:11

## 2018-07-07 RX ADMIN — Medication: at 15:02

## 2018-07-07 RX ADMIN — HYDRALAZINE HYDROCHLORIDE 100 MG: 50 TABLET, FILM COATED ORAL at 20:23

## 2018-07-07 RX ADMIN — Medication 100 MG: at 15:02

## 2018-07-07 RX ADMIN — BACITRACIN ZINC 1 G: 500 OINTMENT TOPICAL at 20:25

## 2018-07-07 RX ADMIN — AMLODIPINE BESYLATE 10 MG: 10 TABLET ORAL at 09:12

## 2018-07-07 RX ADMIN — HYDRALAZINE HYDROCHLORIDE 100 MG: 50 TABLET, FILM COATED ORAL at 05:53

## 2018-07-07 RX ADMIN — Medication 1 DROP: at 15:04

## 2018-07-07 RX ADMIN — CLOPIDOGREL BISULFATE 75 MG: 75 TABLET, FILM COATED ORAL at 09:12

## 2018-07-07 RX ADMIN — KETOCONAZOLE: 20 SHAMPOO TOPICAL at 09:14

## 2018-07-07 RX ADMIN — ASPIRIN 81 MG 81 MG: 81 TABLET ORAL at 09:12

## 2018-07-07 RX ADMIN — Medication 5000 UNITS: at 09:09

## 2018-07-07 RX ADMIN — BACITRACIN ZINC 1 G: 500 OINTMENT TOPICAL at 09:16

## 2018-07-07 ASSESSMENT — PAIN SCALES - GENERAL
PAINLEVEL_OUTOF10: 0
PAINLEVEL_OUTOF10: 0

## 2018-07-07 NOTE — PROGRESS NOTES
Progress Note  NEUROLOGY  [facility]       Patient: Kwan Castano  Unit/Bed: O097/R589-25  YOB: 1933  MRN: [id number]  Acct: [de-identified]   Admitting Diagnosis: Right hemiparesis ([address] [G81.91]  Abnormality of gait and mobility [R26.9]  Admit Date:  6/25/2018  Hospital Day: 12    Subjective:    Patient is improving, still has some memory problems  Patient Seen, Chart, Labs, Radiology studies, and Consults reviewed. Objective:   BP (!) 120/59   Pulse 68   Temp 97 °F (36.1 °C) (Oral)   Resp 17   SpO2 99%   No intake or output data in the 24 hours ending 07/07/18 1727    Physical Exam:  GENERAL APPEARANCE: No distress, alert, interactive and cooperative. MENTAL STATE: Awake, struggles with the year, says that she is in a nursing home but can be redirected. Language is preserved. CRANIAL NERVES: Are normal  CN 2 Visual fields full to confrontation. CN 3, 4, 6 Pupils round, equally reactive to light. No ptosis. EOMs normal alignment, full range with normal saccades, pursuit and convergence. No nystagmus. CN 5 Facial sensation intact bilaterally. CN 7 Normal and symmetric facial strength. Nasolabial folds symmetric. CN 8 Hearing intact to finger rub bilaterally. CN 9 Palate elevates symmetrically. CN 11 Bilaterally normal strength of shoulder shrug and neck turning. CN 12 Tongue midline, with normal bulk and strength; no fasciculations. MOTOR:Muscle strength was 4/5 in distal and proximal muscles in both upper and lower extremities. No fasciculations, tremor or other abnormal movements were present. REFLEXES: Unchanged    SENSORY: Sensory exam was in tact to light touch, skin very fragile pin prick not tested    COORDINATION: Coordination exam was normal. In both upper extremities, finger-nose-finger was intact without dysmetria.           Medications:    bacitracin zinc   Topical BID    ketoconazole   Topical Daily    cyanocobalamin  1,000 mcg Intramuscular Weekly    was low we shall supplement  Anemia being washed her     On Plavix and aspirin   Patient is improving  Pt low level in fuction      Electronically signed by Marsa Severin, MD on 7/7/2018 at 5:27 PM

## 2018-07-07 NOTE — PROGRESS NOTES
Transfer: 0 - Activity does not occur  Shower Transfer: 0 - Activity does not occur,  , Assessment: Pt. continues to require cues and encouragement to engage in therapy, and reorientation at start and throughout each session. Pt. has decreased endurance and attention to task with frequent redirection necessary, but pt. is demonstrating mild improvements with scanning, problem solving and memory. Speech therapy: FIMS: Comprehension: 3 - Patient understands basic needs 50-74% of the time  Expression: 3 - Expresses basic ideas/needs 50-74% of the time  Social Interaction: 4 - Patient appropriate 75-90%+ of the time  Problem Solvin - Patient solves simple/routine tasks 25%-49%  Memory: 2 - Patient remembers 25%-49% of the time      Lab/X-ray studies reviewed, analyzed and discussed with patient and staff:   No results found for this or any previous visit (from the past 24 hour(s)). Previous extensive, complex labs, notes and diagnostics reviewed and analyzed. ALLERGIES:    Allergies as of 2018    (No Known Allergies)      (please also verify by checking MAR)     Yesterday I evaluated this patient for periodic reassessment of medical and functional status. The patient was discussed in detail at the treatment team meeting focusing on current medical issues, progress in therapies, social issues, psychological issues, barriers to progress and strategies to address these barriers, and discharge planning. See the hand written addendum to rehab progress note. The patient continues to be high risk for future disability and their medical and rehabilitation prognosis continue to be good and therefore, we will continue the patient's rehabilitation course as planned. The patient's tentative discharge date was set. Patient and family education was discussed. The patient was made aware of the team discussion regarding their progress.   Discharge plans were discussed along with barriers to progress and protein supplementation vitamin B12 and CoQ10  titrate Megace held DT diarrhea, add protein supplementation add one-on-one encouragement supervision for feeds  6. Acute episodic insomnia with situational adjustment disorder:  prn Ambien, monitor for day time sedation. 7. Falls risk elevated due to impulsivity:  patient to use call light to get nursing assistance to get up, bed and chair alarm. 8. Elevated DVT risk: progressive activities in PT, continue prophylaxis MELISSA hose, elevation and  consult neurology and cardiology to find with blood thinners she can tolerate-she is currently taking aspirin and Plavix  9. Complex discharge planning:  Discharge July 15, 2018 home with daughter who works and home health care PT OT RN aide  and speech therapist as well as a rolling walker   Patient and family education is in progress. The patient is to follow-up with their family physician after discharge. Complex Active General Medical Issues that complicate care Assess & Plan:        1.  acute likely aspiration related Pneumonia of both lungs due to infectious organism-patient is now on Augmentin every 12 hours for 5 days first dose was June 25, 2018, prevent  aspiration  -feeding only with assist up in chair with modified diet and consult speech and language pathology-she is on Augmentin  2. Severe protein-calorie malnutrition-dietary consult add nutritional supplementation protein and vitamin B-12-I have discontinued her Megace seemed to have been causing diarrhea  3. Complete heart block history with recent pacemaker placement,  HTN (hypertension), MI (myocardial infarction), History of PTCA with stents-vital signs checks every shift, monitor for chest pain, dose and titrate Norvasc, Apresoline, Plavix, Hydrodiuril, Cozaar, aspirin, Toprol-XL, Zocor-consult hospitals for backup medical-cardiology will come in to check pacer.   4.   [place] (hard of hearing)-speech therapy to provide listening

## 2018-07-07 NOTE — FLOWSHEET NOTE
Pt in bed, eyes closed, denies pain or needs at this time. Bed alarm active, call light within reach, will continue to monitor.

## 2018-07-08 PROCEDURE — 6370000000 HC RX 637 (ALT 250 FOR IP): Performed by: PHYSICAL MEDICINE & REHABILITATION

## 2018-07-08 PROCEDURE — 2500000003 HC RX 250 WO HCPCS: Performed by: PHYSICAL MEDICINE & REHABILITATION

## 2018-07-08 PROCEDURE — 6360000002 HC RX W HCPCS: Performed by: PHYSICAL MEDICINE & REHABILITATION

## 2018-07-08 PROCEDURE — 1180000000 HC REHAB R&B

## 2018-07-08 PROCEDURE — 6370000000 HC RX 637 (ALT 250 FOR IP): Performed by: INTERNAL MEDICINE

## 2018-07-08 RX ADMIN — ASPIRIN 81 MG 81 MG: 81 TABLET ORAL at 10:39

## 2018-07-08 RX ADMIN — Medication 1 DROP: at 10:41

## 2018-07-08 RX ADMIN — BACITRACIN ZINC 1 G: 500 OINTMENT TOPICAL at 22:45

## 2018-07-08 RX ADMIN — AMLODIPINE BESYLATE 10 MG: 10 TABLET ORAL at 10:40

## 2018-07-08 RX ADMIN — BACITRACIN ZINC: 500 OINTMENT TOPICAL at 14:33

## 2018-07-08 RX ADMIN — LEVOTHYROXINE SODIUM 25 MCG: 25 TABLET ORAL at 05:51

## 2018-07-08 RX ADMIN — Medication 5000 UNITS: at 10:37

## 2018-07-08 RX ADMIN — CLOPIDOGREL BISULFATE 75 MG: 75 TABLET, FILM COATED ORAL at 10:39

## 2018-07-08 RX ADMIN — Medication 100 MG: at 14:26

## 2018-07-08 RX ADMIN — HYDROCHLOROTHIAZIDE 25 MG: 25 TABLET ORAL at 10:38

## 2018-07-08 RX ADMIN — Medication 1 DROP: at 22:45

## 2018-07-08 RX ADMIN — Medication: at 14:28

## 2018-07-08 RX ADMIN — Medication: at 06:00

## 2018-07-08 RX ADMIN — HYDRALAZINE HYDROCHLORIDE 100 MG: 50 TABLET, FILM COATED ORAL at 22:45

## 2018-07-08 RX ADMIN — Medication: at 22:48

## 2018-07-08 RX ADMIN — SIMVASTATIN 20 MG: 20 TABLET, FILM COATED ORAL at 22:44

## 2018-07-08 RX ADMIN — Medication 100 MG: at 10:39

## 2018-07-08 RX ADMIN — Medication 1 DROP: at 14:26

## 2018-07-08 RX ADMIN — KETOCONAZOLE: 20 SHAMPOO TOPICAL at 10:44

## 2018-07-08 RX ADMIN — METOPROLOL SUCCINATE 25 MG: 25 TABLET, EXTENDED RELEASE ORAL at 10:38

## 2018-07-08 RX ADMIN — CYANOCOBALAMIN 1000 MCG: 1000 INJECTION, SOLUTION INTRAMUSCULAR; SUBCUTANEOUS at 10:40

## 2018-07-08 RX ADMIN — LOSARTAN POTASSIUM 50 MG: 50 TABLET, FILM COATED ORAL at 10:39

## 2018-07-08 RX ADMIN — HYDRALAZINE HYDROCHLORIDE 100 MG: 50 TABLET, FILM COATED ORAL at 05:51

## 2018-07-08 NOTE — PROGRESS NOTES
plans with . Complex Physical Medicine & Rehab Issues Assess & Plan:   1. 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 Functional and medical status reassessed regarding patients ability to participate in therapies and patient found to be able to participate in acute intensive comprehensive inpatient rehabilitation program including PT/OT to improve balance, ambulation, ADLs, and to improve the P/AROM. Therapeutic modifications regarding activities in therapies, place, amount of time per day and intensity of therapy made daily. In bed therapies or bedside therapies prn.   2. Bowel loose stools and Bladder dysfunction-bowel and Urinary incontinence:  frequent toileting, ambulate to bathroom with assistance, check post void residuals. Check for C.difficile x1 if >2 loose stools in 24 hours, continue bowel & bladder program.  Monitor bowel and bladder function. Lactinex 2 PO every AC. Hold any and all stool softeners Scheduled toileting add neurogenic bowel program.  3. Severe neck and low back pain generalized OA pain: reassess pain every shift and prior to and after each therapy session, give prn  Percocet or Tylenol, modalities prn in therapy, Lidoderm, K-pad prn.   4. Severe skin tears bilateral upper extremities -sacral redness at side to side turns in a crate mattress as well as E T-max. Skin healing at bilateral upper extremities, heel, buttocks and new pacer site and breakdown risk:  continue pressure relief program.  At Nizoral shampoo to her scalp Daily skin exams and reports from nursing. Betadine to incision  5. Severe frailty and fatigue due to Nutritional and hydration deficiency:  continue to monitor I&Os, calorie counts prn, dietary consult prn.    Encourage protein supplementation vitamin B12 and CoQ10  titrate Megace held DT diarrhea, add

## 2018-07-08 NOTE — PROGRESS NOTES
Patient increased lethargy today. Patient slept in bed most of the day.  Patient had decreased appetite, consuming less than 50-75% each meal. Tracy Quintanilla RN

## 2018-07-09 LAB
BASOPHILS ABSOLUTE: 0.1 K/UL (ref 0–0.2)
BASOPHILS RELATIVE PERCENT: 0.8 %
EOSINOPHILS ABSOLUTE: 0.1 K/UL (ref 0–0.7)
EOSINOPHILS RELATIVE PERCENT: 1.3 %
HCT VFR BLD CALC: 25.8 % (ref 37–47)
HEMOGLOBIN: 8.6 G/DL (ref 12–16)
LYMPHOCYTES ABSOLUTE: 0.8 K/UL (ref 1–4.8)
LYMPHOCYTES RELATIVE PERCENT: 11.9 %
MCH RBC QN AUTO: 29.8 PG (ref 27–31.3)
MCHC RBC AUTO-ENTMCNC: 33.3 % (ref 33–37)
MCV RBC AUTO: 89.3 FL (ref 82–100)
MONOCYTES ABSOLUTE: 0.9 K/UL (ref 0.2–0.8)
MONOCYTES RELATIVE PERCENT: 12.6 %
NEUTROPHILS ABSOLUTE: 5 K/UL (ref 1.4–6.5)
NEUTROPHILS RELATIVE PERCENT: 73.4 %
PDW BLD-RTO: 17.6 % (ref 11.5–14.5)
PLATELET # BLD: 334 K/UL (ref 130–400)
RBC # BLD: 2.89 M/UL (ref 4.2–5.4)
WBC # BLD: 6.8 K/UL (ref 4.8–10.8)

## 2018-07-09 PROCEDURE — 1180000000 HC REHAB R&B

## 2018-07-09 PROCEDURE — 97127 HC OT THER IVNTJ W/FOCUS COG FUNCJ: CPT

## 2018-07-09 PROCEDURE — 97127 HC SP THER IVNTJ W/FOCUS COG FUNCJ: CPT

## 2018-07-09 PROCEDURE — 99232 SBSQ HOSP IP/OBS MODERATE 35: CPT | Performed by: PHYSICAL MEDICINE & REHABILITATION

## 2018-07-09 PROCEDURE — 6370000000 HC RX 637 (ALT 250 FOR IP): Performed by: INTERNAL MEDICINE

## 2018-07-09 PROCEDURE — 6370000000 HC RX 637 (ALT 250 FOR IP): Performed by: PHYSICAL MEDICINE & REHABILITATION

## 2018-07-09 PROCEDURE — 97535 SELF CARE MNGMENT TRAINING: CPT

## 2018-07-09 PROCEDURE — 97112 NEUROMUSCULAR REEDUCATION: CPT

## 2018-07-09 PROCEDURE — 85025 COMPLETE CBC W/AUTO DIFF WBC: CPT

## 2018-07-09 PROCEDURE — 36415 COLL VENOUS BLD VENIPUNCTURE: CPT

## 2018-07-09 PROCEDURE — 97530 THERAPEUTIC ACTIVITIES: CPT

## 2018-07-09 PROCEDURE — 97116 GAIT TRAINING THERAPY: CPT

## 2018-07-09 RX ORDER — ZOLPIDEM TARTRATE 5 MG/1
2.5 TABLET ORAL NIGHTLY
Status: DISCONTINUED | OUTPATIENT
Start: 2018-07-09 | End: 2018-07-15 | Stop reason: HOSPADM

## 2018-07-09 RX ADMIN — LOSARTAN POTASSIUM 50 MG: 50 TABLET, FILM COATED ORAL at 10:26

## 2018-07-09 RX ADMIN — Medication 100 MG: at 10:26

## 2018-07-09 RX ADMIN — CLOPIDOGREL BISULFATE 75 MG: 75 TABLET, FILM COATED ORAL at 10:26

## 2018-07-09 RX ADMIN — Medication 100 MG: at 12:30

## 2018-07-09 RX ADMIN — BACITRACIN ZINC 1 G: 500 OINTMENT TOPICAL at 10:26

## 2018-07-09 RX ADMIN — KETOCONAZOLE: 20 SHAMPOO TOPICAL at 10:30

## 2018-07-09 RX ADMIN — HYDRALAZINE HYDROCHLORIDE 100 MG: 50 TABLET, FILM COATED ORAL at 06:10

## 2018-07-09 RX ADMIN — HYDRALAZINE HYDROCHLORIDE 100 MG: 50 TABLET, FILM COATED ORAL at 14:18

## 2018-07-09 RX ADMIN — Medication: at 21:07

## 2018-07-09 RX ADMIN — METOPROLOL SUCCINATE 25 MG: 25 TABLET, EXTENDED RELEASE ORAL at 10:27

## 2018-07-09 RX ADMIN — HYDROCHLOROTHIAZIDE 25 MG: 25 TABLET ORAL at 10:30

## 2018-07-09 RX ADMIN — BACITRACIN ZINC 1 G: 500 OINTMENT TOPICAL at 21:06

## 2018-07-09 RX ADMIN — AMLODIPINE BESYLATE 10 MG: 10 TABLET ORAL at 10:27

## 2018-07-09 RX ADMIN — Medication: at 06:11

## 2018-07-09 RX ADMIN — ASPIRIN 81 MG 81 MG: 81 TABLET ORAL at 10:26

## 2018-07-09 RX ADMIN — HYDRALAZINE HYDROCHLORIDE 100 MG: 50 TABLET, FILM COATED ORAL at 21:06

## 2018-07-09 RX ADMIN — ZOLPIDEM TARTRATE 2.5 MG: 5 TABLET ORAL at 21:06

## 2018-07-09 RX ADMIN — LEVOTHYROXINE SODIUM 25 MCG: 25 TABLET ORAL at 06:10

## 2018-07-09 RX ADMIN — SIMVASTATIN 20 MG: 20 TABLET, FILM COATED ORAL at 21:06

## 2018-07-09 RX ADMIN — Medication 5000 UNITS: at 10:26

## 2018-07-09 ASSESSMENT — PAIN SCALES - GENERAL
PAINLEVEL_OUTOF10: 0

## 2018-07-09 NOTE — PROGRESS NOTES
PLAN OF CARE/Safety:   Safety Devices  Type of devices: All fall risk precautions in place; Chair alarm in place;Gait belt      Therapy Time:   Individual   Time In 907   Time Out 930   Minutes 23     Minutes:23      Transfer/Bed mobility trainin      Gait trainin      Neuro re education:     Therapeutic ex:      Kyra Sepulveda PTA, 18 at 12:12 PM

## 2018-07-09 NOTE — PROGRESS NOTES
Speech Language Pathology  [facility]  Speech Language/Pathology  Rehab Daily Note                  El Heller  7/9/2018    Rehab Dx/Hx: Right hemiparesis ([address] [G81.91]  Abnormality of gait and mobility [R26.9]    Precautions: falls    ST Dx: [] Aphasia  [] Dysarthria  [] Apraxia   [x] Oropharyngeal dysphagia              [x] Cognitive Impairment  [] Other:     Date of Admit: 6/25/2018  Room #: R251/R251-01      Time in: 1030 Time out: 1100    Subjective:  Behavior: [x] Alert  [x] Cooperative  [x]  Pleasant  [x] Confused  [] Agitated  [] Uncooperative  [x] Distractible [] Motivated  [] Self-Limiting [] Anxious   [] Other:    Endurance:  [x] Adequate for participation in SLP sessions  [] Reduced overall   [] Lethargic  [] Other:          Interventions used this date:  [] Speech Treatment       [x] Expressive Language  [x] Receptive Language   [] Dysphagia Treatment   [x] Cognitive Skill Lester  [] Oral Motor  [] Voice Treatment       []  AAC     [] ESTIM  [] Speech production       [] Therapeutic Meal Monitor         [] Instruction in compensatory strategies     Objective/Assessment:  Goal 1: Pt will follow conditional directions with 70% accuracy with min cues to increase the pt's ability to follow directions in the home setting with new and familiar situations. Not targeted this date. Goal 2: Pt will name 10/10 items in a concrete category with min cues to promote semantic organization and help the patient express her basic personal, safety, and medical wants and needs. Pt named 3 items within a given simple category with moderate cues and repetition     Goal 3: Pt will sustain attention to tasks for 10 minutes with occasional redirection to promote pt's ability to participate in function therapeutic activities. Pt needed redirection and repetition to identify words in simple category.   She could read the words correctly but was able to complete the task only with moderate/max

## 2018-07-09 NOTE — PROGRESS NOTES
per OT POC   G-Code     OutComes Score                                           AM-PAC Score             Goals  Patient Goals   Patient goals : \"Take care of myself. \"        Therapy Time   Individual Concurrent Group Co-treatment   Time In 1300         Time Out 1400         Minutes Via RethinkDBALLISON delgado Electronically signed by ALLISON Bradford on 7/9/2018 at 3:59 PM Statement Selected

## 2018-07-09 NOTE — PROGRESS NOTES
Occupational Therapy  [facility]  Daily Treatment Note  NAME: Lanny Silva  : 1933  MRN: [id number]    Date of Service: 2018    Discharge Recommendations:  Patient would benefit from continued therapy after discharge       Patient Diagnosis(es): There were no encounter diagnoses. has a past medical history of HTN (hypertension) and Hypothyroidism. has no past surgical history on file. Restrictions  Restrictions/Precautions  Restrictions/Precautions: Modified Diet, Fall Risk  Position Activity Restriction  Other position/activity restrictions: Pacemaker precautions    Subjective   General  Chart Reviewed: Yes  Patient assessed for rehabilitation services?: Yes  Response to previous treatment: Patient with no complaints from previous session  Family / Caregiver Present: No  Referring Practitioner: Dr. Keyana Kenny  Diagnosis: Right hemiparesis secondary to left cerebral infarct   Pre Treatment Pain Screening  Pain at present: 0  Scale Used: Numeric Score  Intervention List: Patient able to continue with treatment  Pain Assessment  Patient Currently in Pain: Denies  Pain Assessment: 0-10  Pain Level: 0  Vital Signs  Patient Currently in Pain: Denies     Orientation  Orientation  Orientation Level: Oriented to person;Disoriented to place; Disoriented to time;Disoriented to situation (Pt able to recall \"July\" but unable to recall year or day of week.)    Objective    ADL  Grooming: Minimal assistance (Pt washed hands in standing at sink. During hand washing, pt attempted to sit down x 3 with no chair present. Pt required min A and verbal cues to maintain upright standing posture for safety. Pt required auditory cues of tapping to locate soap/towel to complete handwashing with increased time. )  LE Dressing: Moderate assistance (Pt required changes of pants d/t bowel accident.  Pt was able to doff pants with SBA and verbal cues for seqeuencing, but required moderate assistance to don

## 2018-07-09 NOTE — PROGRESS NOTES
Physical Therapy Rehab Treatment Note  [facility]/Department[facility]  [facility]       NAME: Lanny Silva  : 1933 (80 y.o.)  MRN: [id number]  CODE STATUS: Limited    Date of Service: 2018  Chart Reviewed: Yes  Family / Caregiver Present: No  General Comment  Comments: Pt continues to be reoriented, but unable to remember what she was told. Restrictions:  Restrictions/Precautions: Modified Diet, Fall Risk  Position Activity Restriction  Other position/activity restrictions: Pacemaker precautions       SUBJECTIVE: Subjective: Pt reports having a good lunch and able to feed self. Response To Previous Treatment: Patient with no complaints from previous session. Pain Screening  Patient Currently in Pain: Denies  Pre Treatment Pain Screening  Pain at present: 0  Scale Used: Numeric Score  Intervention List: Patient able to continue with treatment         OBJECTIVE:   Overall Orientation Status: Impaired  Orientation Level: Oriented to person           Neuromuscular Education  NDT Treatment: Gait  (With ball bouncing during gait or picking up ball when not caught: CGA to Minimal assist.  Pt's fear of falling interrupts pt ability to perform task well)      Bed mobility  Comment: NT secondary to time: Independent previously. No change expected. Transfers  Sit to Stand: Supervision  Stand to sit: Supervision  Car transfer: SBA    Ambulation  Ambulation?: Yes  Ambulation 1  Surface: carpet;level tile  Device: Rollator  Assistance: Stand by assistance  Quality of Gait: Pt with improved gait in PM with increased step length. Distance: 150'  Stairs/Curb  Stairs?: Yes   Stairs  # Steps : 4  Stairs Height: 6\"  Rails: Bilateral  Assistance: Stand by assistance;Supervision  Comment: Reciprocal ascending and descending      ASSESSMENT/COMMENTS: Pt with increased dyspnea in PM: SpO2 100% when checked.    Increased rest breaks needed this PM.          PLAN OF CARE/Safety:   Safety Devices  Type of

## 2018-07-09 NOTE — PROGRESS NOTES
Subjective: The patient complains of severe  acute  right-sided weakness and aphasia and dysphagia partially relieved by  PT, OT, speech pathology and exacerbated by  overexertion and recent left cerebral infarcts. I am concerned about patients progressive depression and chronic pain issues as well as her need for Percocet for her back and neck pain. Her nausea and diarrhea have resolved. She is eating better even without the Megace. She is occasionally incontinent from stress incontinence otherwise doing well. She needs assistance to start off with feeding however that she is able to feed herself. She complains of insomnia and I discussed using a very low-dose of the Ambien. She is in agreement. ROS x10: The patient also complains of severely impaired mobility and activities of daily living. Otherwise no new problems with vision, hearing, nose, mouth, throat, dermal, cardiovascular, GI, , pulmonary, musculoskeletal, psychiatric or neurological. See Rehab H&P on Rehab chart dated . Vital signs:  BP (!) 164/66   Pulse 73   Temp 97 °F (36.1 °C) (Oral)   Resp 18   SpO2 98%   I/O:   PO/Intake:  fair PO intake,  dysphagia 2 with one-on-one supervision assist for feeds    Bowel/Bladder:   Incontinent of bowel and bladder-occasional loose stools  General:  Patient is  frail and cachectic, adequately nourished, non-obese and     well kempt. HEENT:    PERRLA, hearing intact to loud voice, external inspection of ear     and nose benign. Inspection of lips, tongue and gums-severe thrush  Musculoskeletal: No significant change in strength or tone. All joints stable. Inspection and palpation of digits and nails show no clubbing,       cyanosis or inflammatory conditions. Neuro/Psychiatric: Affect: flat but pleasant. Alert and oriented to person, place and     situation.   No significant change in deep tendon reflexes or     Sensation-right hemiparesis secondary session. Occupational therapy: FIMS:  Eating:  (Did not occur. )  Groomin - Did not occur  Bathin - Did not occur  Dressing-Upper: 0 - Did not occur  Dressing-Lower: 0 - Did not occur  Toiletin - Able to perform 1 task only (e.g. hygiene)  Toilet Transfer: 4 - [address] assistance only < 25% assist  Tub Transfer: 0 - Activity does not occur  Shower Transfer: 0 - Activity does not occur,  , Assessment: Pt. continues to require cues and encouragement to engage in therapy, and reorientation at start and throughout each session. Pt. has decreased endurance and attention to task with frequent redirection necessary, but pt. is demonstrating mild improvements with scanning, problem solving and memory. Speech therapy: FIMS: Comprehension: 3 - Patient understands basic needs 50-74% of the time  Expression: 3 - Expresses basic ideas/needs 50-74% of the time  Social Interaction: 4 - Patient appropriate 75-90%+ of the time  Problem Solvin - Patient solves simple/routine tasks 25%-49%  Memory: 2 - Patient remembers 25%-49% of the time      Lab/X-ray studies reviewed, analyzed and discussed with patient and staff:   Recent Results (from the past 24 hour(s))   CBC Auto Differential    Collection Time: 18  6:08 AM   Result Value Ref Range    WBC 6.8 4.8 - 10.8 K/uL    RBC 2.89 (L) 4.20 - 5.40 M/uL    Hemoglobin 8.6 (L) 12.0 - 16.0 g/dL    Hematocrit 25.8 (L) 37.0 - 47.0 %    MCV 89.3 82.0 - 100.0 fL    MCH 29.8 27.0 - 31.3 pg    MCHC 33.3 33.0 - 37.0 %    RDW 17.6 (H) 11.5 - 14.5 %    Platelets 834 493 - 596 K/uL    Neutrophils % 73.4 %    Lymphocytes % 11.9 %    Monocytes % 12.6 %    Eosinophils % 1.3 %    Basophils % 0.8 %    Neutrophils # 5.0 1.4 - 6.5 K/uL    Lymphocytes # 0.8 (L) 1.0 - 4.8 K/uL    Monocytes # 0.9 (H) 0.2 - 0.8 K/uL    Eosinophils # 0.1 0.0 - 0.7 K/uL    Basophils # 0.1 0.0 - 0.2 K/uL       Previous extensive, complex labs, notes and diagnostics reviewed and analyzed. ALLERGIES:    Allergies as of 06/25/2018    (No Known Allergies)      (please also verify by checking [facility])           Complex Physical Medicine & Rehab Issues Assess & Plan:   1. 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 Functional and medical status reassessed regarding patients ability to participate in therapies and patient found to be able to participate in acute intensive comprehensive inpatient rehabilitation program including PT/OT to improve balance, ambulation, ADLs, and to improve the P/AROM. Therapeutic modifications regarding activities in therapies, place, amount of time per day and intensity of therapy made daily. In bed therapies or bedside therapies prn.   2. Bowel loose stools and Bladder dysfunction-bowel and Urinary incontinence:  frequent toileting, ambulate to bathroom with assistance, check post void residuals. Check for C.difficile x1 if >2 loose stools in 24 hours, continue bowel & bladder program.  Monitor bowel and bladder function. Lactinex 2 PO every AC. Hold any and all stool softeners Scheduled toileting add neurogenic bowel program.  3. Severe neck and low back pain generalized OA pain: reassess pain every shift and prior to and after each therapy session, give prn  Percocet or Tylenol, modalities prn in therapy, Lidoderm, K-pad prn.   4. Severe skin tears bilateral upper extremities -sacral redness at side to side turns in a crate mattress as well as E T-max. Skin healing at bilateral upper extremities, heel, buttocks and new pacer site and breakdown risk:  continue pressure relief program.  At Nizoral shampoo to her scalp Daily skin exams and reports from nursing. Betadine to incision  5. Severe frailty and fatigue due to Nutritional and hydration deficiency:  continue to monitor I&Os, calorie counts prn, dietary consult prn.    Encourage

## 2018-07-09 NOTE — PROGRESS NOTES
reached with L hand when cued, within PPM precautions. Pt. tolerated tx with max encouragement; increased confusion and need for cuing today. Cognition  Overall Cognitive Status: Exceptions  Arousal/Alertness: Delayed responses to stimuli  Following Commands: Follows one step commands with repetition  Attention Span: Difficulty attending to directions; Difficulty dividing attention  Memory: Decreased short term memory;Decreased long term memory;Decreased recall of precautions;Decreased recall of biographical Information;Decreased recall of recent events  Safety Judgement: Decreased awareness of need for assistance;Decreased awareness of need for safety  Problem Solving: Assistance required to generate solutions;Assistance required to implement solutions;Assistance required to identify errors made;Assistance required to correct errors made  Insights: Decreased awareness of deficits  Initiation: Requires cues for all  Sequencing: Requires cues for all     Assessment   Performance deficits / Impairments: Decreased functional mobility ; Decreased ADL status; Decreased safe awareness;Decreased balance;Decreased strength;Decreased endurance;Decreased high-level IADLs;Decreased fine motor control;Decreased coordination;Decreased cognition  Assessment: Pt's limited cognition and safety awareness are pt's largest barriers to increased independence with ADLs and basic IADL tasks. Pt would continue to benefit from skilled therapy services to address noted deficits and improve overall safety and independence in daily living. REQUIRES OT FOLLOW UP: Yes  Activity Tolerance  Activity Tolerance: Treatment limited secondary to decreased cognition  Safety Devices  Safety Devices in place: Yes  Type of devices:  All fall risk precautions in place          Plan   Plan  Times per week:  minutes per day, 5-7 days per week  Plan weeks: 2-3 weeks   Current Treatment Recommendations: Strengthening, ROM, Balance Training,

## 2018-07-10 LAB
BACTERIA: ABNORMAL /HPF
BILIRUBIN URINE: NEGATIVE
BLOOD, URINE: NEGATIVE
CLARITY: CLEAR
COLOR: YELLOW
EPITHELIAL CELLS, UA: ABNORMAL /HPF
GLUCOSE URINE: NEGATIVE MG/DL
KETONES, URINE: NEGATIVE MG/DL
LEUKOCYTE ESTERASE, URINE: ABNORMAL
NITRITE, URINE: NEGATIVE
PH UA: 6.5 (ref 5–9)
PROTEIN UA: NEGATIVE MG/DL
RBC UA: ABNORMAL /HPF (ref 0–2)
SPECIFIC GRAVITY UA: 1.01 (ref 1–1.03)
UROBILINOGEN, URINE: 0.2 E.U./DL
WBC UA: ABNORMAL /HPF (ref 0–5)

## 2018-07-10 PROCEDURE — 1180000000 HC REHAB R&B

## 2018-07-10 PROCEDURE — 97530 THERAPEUTIC ACTIVITIES: CPT

## 2018-07-10 PROCEDURE — 97116 GAIT TRAINING THERAPY: CPT

## 2018-07-10 PROCEDURE — 97112 NEUROMUSCULAR REEDUCATION: CPT

## 2018-07-10 PROCEDURE — 6370000000 HC RX 637 (ALT 250 FOR IP): Performed by: INTERNAL MEDICINE

## 2018-07-10 PROCEDURE — 97127 HC SP THER IVNTJ W/FOCUS COG FUNCJ: CPT

## 2018-07-10 PROCEDURE — 99231 SBSQ HOSP IP/OBS SF/LOW 25: CPT | Performed by: PHYSICAL MEDICINE & REHABILITATION

## 2018-07-10 PROCEDURE — 97127 HC OT THER IVNTJ W/FOCUS COG FUNCJ: CPT

## 2018-07-10 PROCEDURE — 6370000000 HC RX 637 (ALT 250 FOR IP): Performed by: PHYSICAL MEDICINE & REHABILITATION

## 2018-07-10 PROCEDURE — 81001 URINALYSIS AUTO W/SCOPE: CPT

## 2018-07-10 RX ADMIN — Medication: at 22:08

## 2018-07-10 RX ADMIN — SIMVASTATIN 20 MG: 20 TABLET, FILM COATED ORAL at 22:06

## 2018-07-10 RX ADMIN — AMLODIPINE BESYLATE 10 MG: 10 TABLET ORAL at 08:51

## 2018-07-10 RX ADMIN — CLOPIDOGREL BISULFATE 75 MG: 75 TABLET, FILM COATED ORAL at 08:51

## 2018-07-10 RX ADMIN — ZOLPIDEM TARTRATE 2.5 MG: 5 TABLET ORAL at 22:06

## 2018-07-10 RX ADMIN — Medication: at 05:59

## 2018-07-10 RX ADMIN — LOSARTAN POTASSIUM 50 MG: 50 TABLET, FILM COATED ORAL at 08:51

## 2018-07-10 RX ADMIN — BACITRACIN ZINC 1 G: 500 OINTMENT TOPICAL at 22:07

## 2018-07-10 RX ADMIN — Medication 5000 UNITS: at 08:51

## 2018-07-10 RX ADMIN — ASPIRIN 81 MG 81 MG: 81 TABLET ORAL at 08:51

## 2018-07-10 RX ADMIN — Medication 1 DROP: at 22:07

## 2018-07-10 RX ADMIN — METOPROLOL SUCCINATE 25 MG: 25 TABLET, EXTENDED RELEASE ORAL at 08:51

## 2018-07-10 RX ADMIN — HYDRALAZINE HYDROCHLORIDE 100 MG: 50 TABLET, FILM COATED ORAL at 22:06

## 2018-07-10 RX ADMIN — LEVOTHYROXINE SODIUM 25 MCG: 25 TABLET ORAL at 05:59

## 2018-07-10 RX ADMIN — HYDRALAZINE HYDROCHLORIDE 100 MG: 50 TABLET, FILM COATED ORAL at 06:00

## 2018-07-10 RX ADMIN — HYDRALAZINE HYDROCHLORIDE 100 MG: 50 TABLET, FILM COATED ORAL at 14:15

## 2018-07-10 RX ADMIN — Medication 100 MG: at 12:33

## 2018-07-10 RX ADMIN — Medication 100 MG: at 08:51

## 2018-07-10 RX ADMIN — HYDROCHLOROTHIAZIDE 25 MG: 25 TABLET ORAL at 08:51

## 2018-07-10 RX ADMIN — BACITRACIN ZINC 1 G: 500 OINTMENT TOPICAL at 08:50

## 2018-07-10 ASSESSMENT — PAIN SCALES - WONG BAKER: WONGBAKER_NUMERICALRESPONSE: 0

## 2018-07-10 NOTE — PROGRESS NOTES
extremities, finger-nose-finger was intact without dysmetria. GAIT: Gait was not testable  Like to be in a fetal position      Medications:    amLODIPine  10 mg Oral Daily    levothyroxine  25 mcg Oral Daily    hydrALAZINE  100 mg Oral 3 times per day    clopidogrel  75 mg Oral Daily    aspirin  81 mg Oral Daily    sodium chloride  250 mL Intravenous Once    losartan  50 mg Oral Daily    metoprolol succinate  25 mg Oral Daily    hydrochlorothiazide  25 mg Oral Daily    vitamin D  5,000 Units Oral Daily    piperacillin-tazobactam  3.375 g Intravenous Q8H    sodium chloride flush  10 mL Intravenous 2 times per day    azithromycin  500 mg Intravenous Q24H    sodium chloride  500 mL Intra-arterial Once    docusate sodium  100 mg Oral BID    simvastatin  20 mg Oral Nightly     Continuous Infusions:    PRN Meds:sodium chloride flush, oxyCODONE-acetaminophen, sodium chloride flush, hydrALAZINE, acetaminophen, magnesium hydroxide, ondansetron    LABS  CBC:   Recent Labs      06/21/18   2307  06/22/18   0512  06/24/18   0502   WBC  7.5  8.2  10.6   HGB  11.0*  10.8*  10.4*   PLT  237  243  303     BMP:    Recent Labs      06/22/18   0512  06/23/18   0458  06/24/18   0502   NA  132  132  132   K  4.3  4.2  5.1   CL  101  98  94*   CO2  20*  19*  24   BUN  21  16  21   CREATININE  0.63  0.62  0.74   GLUCOSE  88  105  118*     TSH:    No results for input(s): TSH in the last 72 hours. B12:    No results for input(s): Gonzalez Tapia in the last 72 hours. Vit. D:   No results for input(s): VITD25 in the last 72 hours. Lipids: No results found for: CHOL  No results found for: TRIG  No results found for: HDL  No results found for: LDLCHOLESTEROL, LDLCALC  No results found for: LABVLDL, VLDL  No results found for: CHOLHDLRATIO    Ammonia:No results for input(s): AMMONIA in the last 72 hours.   LFT:   Recent Labs      06/24/18   0502   AST  20   ALT  9   BILITOT  0.5   ALKPHOS  74        Urine:   No results for input(s): Nidhi Maguire, GLUCOSEU, BLOODU, PHUR, PROTEINU, UROBILINOGEN, LEUKOCYTESUR in the last 72 hours.     Invalid input(s):  Janie Concepcion,  SPECGRAV,  NITRU     Assessment/Plan:    Likely her LOC was cardiac in origin  Currently not delirious, With slow improvement  We will continue to monitor her clinical progress  Can repeat CT head on  6/20 depending on patients hemodynamic stability  EEG done showed diffuse slowing  Continue supportive measures  Lab work showed elevated TSH, to watch  Vitamin D level was low we shall supplement  Anemia being washed her    On Plavix and aspirin   Patient is improving  Pt low level in fuction    Ramesh Montano MD

## 2018-07-10 NOTE — PROGRESS NOTES
significant lower extremity edema or tenderness. Skin:   Intact buttocks slightly reddened covered with Mepilex upper extremity skin tears and    left heel redness -healing left upper chest pacer incision    Rehabilitation:  Physical therapy: FIMS:  Bed Mobility: Scooting: Independent    Transfers: Sit to Stand: Supervision  Stand to sit: Supervision  Bed to Chair: Supervision  Stand Pivot Transfers: Contact guard assistance (without ad), Ambulation 1  Surface: carpet, level tile  Device: Rollator  Assistance: Stand by assistance  Quality of Gait: Pt with improved gait in PM with increased step length. Distance: 150'  Comments: needed seated rest break after 80 feet. poor approach to sit and wanted to lay down instead of continuing her walk. , Stairs  # Steps : 4  Stairs Height: 6\"  Rails: Bilateral  Assistance: Stand by assistance, Supervision  Comment: Reciprocal ascending and descending    FIMS: Bed, Chair, Wheel Chair: 4 - Requires steadying assistance only <25% assist  and/or requires assist with one leg only  Walk: 5 - Exception Household Locomotion Walks/operates wheelchair at least 50 feet Independently with or without a device May require an extra amount of time OR there may be a concern for safety  Distance Walked: 16  Wheel Chair: 0 - Activity Not Assessed/Does Not Occur  Distance Traveled in Wheel Chair: 0  Stairs: 2- Maximal Assistance Performs 25-49% of the effort to go up and down 4 to 6 stairs and requires the assistance of one person only,  , Assessment: Patient demonstrated decreased awareness of environmental obsticles. Improved posture noted this session.     Occupational therapy: FIMS:  Eatin - Feeds self with setup/supervision/cues and/or requires only setup/supervision/cues to perform tube feedings  Groomin - Did not occur  Bathin - Did not occur  Dressing-Upper: 0 - Did not occur  Dressing-Lower: 0 - Did not occur  Toiletin - Able to perform 1 task only (e.g. hygiene)  Toilet Transfer: 4 - Requires steadying assistance only < 25% assist  Tub Transfer: 0 - Activity does not occur  Shower Transfer: 0 - Activity does not occur,  , Assessment: Pt's limited cognition and safety awareness are pt's largest barriers to increased independence with ADLs and basic IADL tasks. Pt would continue to benefit from skilled therapy services to address noted deficits and improve overall safety and independence in daily living. Speech therapy: FIMS: Comprehension: 2 - Patient understands basic needs 25-49% of the time  Expression: 2 - Expresses basic ideas/needs 25-49% of the time  Social Interaction: 4 - Patient appropriate 75-90%+ of the time  Problem Solvin - Patient solves simple/routine tasks 25%-49%  Memory: 2 - Patient remembers 25%-49% of the time      Lab/X-ray studies reviewed, analyzed and discussed with patient and staff:   No results found for this or any previous visit (from the past 24 hour(s)). Previous extensive, complex labs, notes and diagnostics reviewed and analyzed. ALLERGIES:    Allergies as of 2018    (No Known Allergies)      (please also verify by checking [facility])        Complex Physical Medicine & Rehab Issues Assess & Plan:   1. 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 Functional and medical status reassessed regarding patients ability to participate in therapies and patient found to be able to participate in acute intensive comprehensive inpatient rehabilitation program including PT/OT to improve balance, ambulation, ADLs, and to improve the P/AROM. Therapeutic modifications regarding activities in therapies, place, amount of time per day and intensity of therapy made daily.   In bed therapies or bedside therapies prn.   2. Bowel loose stools and Bladder dysfunction-bowel and Urinary incontinence:  frequent toileting, ambulate to bathroom with assistance, check post void residuals. Check for C.difficile x1 if >2 loose stools in 24 hours, continue bowel & bladder program.  Monitor bowel and bladder function. Lactinex 2 PO every AC. Hold any and all stool softeners Scheduled toileting add neurogenic bowel program.  3. Severe neck and low back pain generalized OA pain: reassess pain every shift and prior to and after each therapy session, give prn  Percocet or Tylenol, modalities prn in therapy, Lidoderm, K-pad prn.   4. Severe skin tears bilateral upper extremities -sacral redness at side to side turns in a crate mattress as well as E T-max. Skin healing at bilateral upper extremities, heel, buttocks and new pacer site and breakdown risk:  continue pressure relief program.  At [employer] shampoo to her scalp Daily skin exams and reports from nursing. Betadine to incision  5. Severe frailty and fatigue due to Nutritional and hydration deficiency:  continue to monitor I&Os, calorie counts prn, dietary consult prn. Encourage protein supplementation vitamin B12 and CoQ10  titrate Megace held DT diarrhea, add protein supplementation add one-on-one encouragement supervision for feeds  6. Acute episodic insomnia with situational adjustment disorder:  prn 2.5 mg Ambien, monitor for day time sedation. 7. Falls risk elevated due to impulsivity:  patient to use call light to get nursing assistance to get up, bed and chair alarm. 8. Elevated DVT risk: progressive activities in PT, continue prophylaxis MELISSA hose, elevation and  consult neurology and cardiology to find with blood thinners she can tolerate-she is currently taking aspirin and Plavix  9. Complex discharge planning:  Final team meeting this Thursday. Discharge July 15, 2018 home with daughter who works and home health care PT OT RN aide  and speech therapist as well as a rolling walker   Patient and family education is in progress.   The patient is to

## 2018-07-10 NOTE — PROGRESS NOTES
commands with repetition; Follows multistep commands with repitition; Follows multistep commands with increased time  Attention Span: Difficulty attending to directions; Difficulty dividing attention  Safety Judgement: Decreased awareness of need for assistance;Decreased awareness of need for safety  Problem Solving: Assistance required to generate solutions;Assistance required to implement solutions;Assistance required to identify errors made;Assistance required to correct errors made  Insights: Decreased awareness of deficits  Initiation: Requires cues for all  Sequencing: Requires cues for all  Cognition Comment: Cognitive Cards: Pt sequenced index cards with days of the week written on them with 1 error. Pt was able to correct error with cues. Pt sequenced the alphabet with Mod difficulty and verbal cues. Pt sequenced the months of the year with Mod difficulty and verbal cues. Heavy probting for all sequencing. Assessment   Activity Tolerance  Activity Tolerance: Patient Tolerated treatment well  Activity Tolerance: Frequent cues for encouragement. Safety Devices  Safety Devices in place: Yes  Type of devices: All fall risk precautions in place          Plan   Plan  Times per week:  minutes per day, 5-7 days per week  Plan weeks: 2-3 weeks   Current Treatment Recommendations: Strengthening, ROM, Balance Training, Functional Mobility Training, Endurance Training, Neuromuscular Re-education, Cognitive Reorientation, Safety Education & Training, Patient/Caregiver Education & Training, Equipment Evaluation, Education, & procurement, Self-Care / ADL, Home Management Training, Cognitive/Perceptual Training  Plan Comment: Continue per OT POC     Goals  Patient Goals   Patient goals : \"Take care of myself. \"        Therapy Time   Individual Concurrent Group Co-treatment   Time In 1300         Time Out 1400         Minutes 60           Electronically signed by ALLISON Julio on 7/10/2018 at 2:00 PM    [facility]

## 2018-07-10 NOTE — PLAN OF CARE
Problem: Risk for Impaired Skin Integrity  Goal: Tissue integrity - skin and mucous membranes  Structural intactness and normal physiological function of skin and  mucous membranes. Outcome: Ongoing  Skin assessment completed, patient turned every two hours, toileting offered, brief changed if needed, waffle mattress utilized, hourly rounding completed, call light within reach.

## 2018-07-10 NOTE — PROGRESS NOTES
Occupational Therapy  Facility/Department: [facility]  Daily Treatment Note  NAME: Samantha Rodriguez  : 1933  MRN: [id number]    Date of Service: 7/10/2018    Discharge Recommendations:  Patient would benefit from continued therapy after discharge       Patient Diagnosis(es): There were no encounter diagnoses. has a past medical history of HTN (hypertension) and Hypothyroidism. has no past surgical history on file. Restrictions  Restrictions/Precautions  Restrictions/Precautions: Modified Diet, Fall Risk  Position Activity Restriction  Other position/activity restrictions: Pacemaker precautions  Subjective   General  Chart Reviewed: Yes  Patient assessed for rehabilitation services?: Yes  Response to previous treatment: Patient with no complaints from previous session  Family / Caregiver Present: No  Referring Practitioner: Dr. Miguel Saenz  Diagnosis: Right hemiparesis secondary to left cerebral infarct   Pre Treatment Pain Screening  Pain at present: 0  Scale Used: Numeric Score  Pain Assessment  Patient Currently in Pain: No  Pain Assessment: 0-10  Solorio-Baker Pain Rating: No hurt  Vital Signs  Patient Currently in Pain: No   Orientation     Objective        Pt engaged in therapeutic activities to promote cognitive skills. Completed sequencing days of the week , months of the year with holidays with mod assist. Sorting cards with min assist.                                                                             Assessment      Activity Tolerance  Activity Tolerance: Patient Tolerated treatment well  Safety Devices  Safety Devices in place: Yes  Type of devices:  All fall risk precautions in place          Plan   Plan  Times per week:  minutes per day, 5-7 days per week  Plan weeks: 2-3 weeks   Current Treatment Recommendations: Strengthening, ROM, Balance Training, Functional Mobility Training, Endurance Training, Neuromuscular Re-education, Cognitive Reorientation, Safety Education & Training,

## 2018-07-11 PROCEDURE — 97535 SELF CARE MNGMENT TRAINING: CPT

## 2018-07-11 PROCEDURE — 97127 HC OT THER IVNTJ W/FOCUS COG FUNCJ: CPT

## 2018-07-11 PROCEDURE — 1180000000 HC REHAB R&B

## 2018-07-11 PROCEDURE — 97116 GAIT TRAINING THERAPY: CPT

## 2018-07-11 PROCEDURE — 6370000000 HC RX 637 (ALT 250 FOR IP): Performed by: PHYSICAL MEDICINE & REHABILITATION

## 2018-07-11 PROCEDURE — 97127 HC SP THER IVNTJ W/FOCUS COG FUNCJ: CPT

## 2018-07-11 PROCEDURE — 97530 THERAPEUTIC ACTIVITIES: CPT

## 2018-07-11 PROCEDURE — 99232 SBSQ HOSP IP/OBS MODERATE 35: CPT | Performed by: PHYSICAL MEDICINE & REHABILITATION

## 2018-07-11 PROCEDURE — 92526 ORAL FUNCTION THERAPY: CPT

## 2018-07-11 PROCEDURE — 6370000000 HC RX 637 (ALT 250 FOR IP): Performed by: INTERNAL MEDICINE

## 2018-07-11 PROCEDURE — 97112 NEUROMUSCULAR REEDUCATION: CPT

## 2018-07-11 RX ORDER — NITROFURANTOIN 25; 75 MG/1; MG/1
100 CAPSULE ORAL
Status: DISCONTINUED | OUTPATIENT
Start: 2018-07-11 | End: 2018-07-15 | Stop reason: HOSPADM

## 2018-07-11 RX ADMIN — HYDRALAZINE HYDROCHLORIDE 100 MG: 50 TABLET, FILM COATED ORAL at 05:56

## 2018-07-11 RX ADMIN — BACITRACIN ZINC 1 G: 500 OINTMENT TOPICAL at 20:58

## 2018-07-11 RX ADMIN — Medication 5000 UNITS: at 08:45

## 2018-07-11 RX ADMIN — Medication 1 DROP: at 13:03

## 2018-07-11 RX ADMIN — BACITRACIN ZINC 1 G: 500 OINTMENT TOPICAL at 08:45

## 2018-07-11 RX ADMIN — SIMVASTATIN 20 MG: 20 TABLET, FILM COATED ORAL at 20:50

## 2018-07-11 RX ADMIN — KETOCONAZOLE: 20 SHAMPOO TOPICAL at 08:50

## 2018-07-11 RX ADMIN — Medication 100 MG: at 13:01

## 2018-07-11 RX ADMIN — HYDRALAZINE HYDROCHLORIDE 100 MG: 50 TABLET, FILM COATED ORAL at 20:51

## 2018-07-11 RX ADMIN — Medication 100 MG: at 08:45

## 2018-07-11 RX ADMIN — Medication: at 06:00

## 2018-07-11 RX ADMIN — METOPROLOL SUCCINATE 25 MG: 25 TABLET, EXTENDED RELEASE ORAL at 08:45

## 2018-07-11 RX ADMIN — LOSARTAN POTASSIUM 50 MG: 50 TABLET, FILM COATED ORAL at 08:45

## 2018-07-11 RX ADMIN — HYDRALAZINE HYDROCHLORIDE 100 MG: 50 TABLET, FILM COATED ORAL at 13:01

## 2018-07-11 RX ADMIN — AMLODIPINE BESYLATE 10 MG: 10 TABLET ORAL at 08:45

## 2018-07-11 RX ADMIN — LEVOTHYROXINE SODIUM 25 MCG: 25 TABLET ORAL at 07:52

## 2018-07-11 RX ADMIN — NITROFURANTOIN (MONOHYDRATE/MACROCRYSTALS) 100 MG: 75; 25 CAPSULE ORAL at 16:53

## 2018-07-11 RX ADMIN — ZOLPIDEM TARTRATE 2.5 MG: 5 TABLET ORAL at 20:50

## 2018-07-11 RX ADMIN — ASPIRIN 81 MG 81 MG: 81 TABLET ORAL at 08:45

## 2018-07-11 RX ADMIN — HYDROCHLOROTHIAZIDE 25 MG: 25 TABLET ORAL at 08:45

## 2018-07-11 RX ADMIN — CLOPIDOGREL BISULFATE 75 MG: 75 TABLET, FILM COATED ORAL at 08:45

## 2018-07-11 RX ADMIN — Medication 1 DROP: at 08:51

## 2018-07-11 RX ADMIN — Medication: at 13:03

## 2018-07-11 ASSESSMENT — PAIN SCALES - GENERAL
PAINLEVEL_OUTOF10: 0
PAINLEVEL_OUTOF10: 0

## 2018-07-11 ASSESSMENT — PAIN SCALES - WONG BAKER
WONGBAKER_NUMERICALRESPONSE: 0
WONGBAKER_NUMERICALRESPONSE: 0

## 2018-07-11 NOTE — CARE COORDINATION
I spoke with patients dtr. about her discharge planned for 7/15/18 to home with her and [facility]. Kavitha Wright Dtr was given a list of [facility] agencies and will let me know who she chooses. Pt is participating in all therapy  But  Does c/o fatigue often. Pt remains confused at times and need reorientation frequently. Pt eats usually 50-75% of her trays but benefits from encouragement to eat more. Nursing staff is monitoring her meals. Pt is toileted every 2-3 hours but does not always know when she has to go. Discussed all of the above with her dtr.     Electronically signed by Eugenio Elizabeth RN on 7/11/2018 at 2:52 PM

## 2018-07-11 NOTE — PROGRESS NOTES
Subjective: The patient complains of severe  acute  right-sided weakness and aphasia and dysphagia partially relieved by  PT, OT, speech pathology and exacerbated by  overexertion and recent left cerebral infarcts. I am concerned about patients progressive depression and chronic pain issues as well as her need for Percocet for her back and neck pain. I'm concerned about his slightly elevated temperature. I am concerned about trace leukocyte esterase on her UA. She still has a slightly height temperature going to go ahead and start her on Macrobid for now. Recent infectious disease workup otherwise seems unremarkable so far the urine looks mostly clear and her white blood cell count is normal.  I'll keep an eye on her clinical status and watch that urine culture. I will also repeat his C. diff if needed. Her recent C. diff toxin was negative. ROS x10: The patient also complains of severely impaired mobility and activities of daily living. Otherwise no new problems with vision, hearing, nose, mouth, throat, dermal, cardiovascular, GI, , pulmonary, musculoskeletal, psychiatric or neurological. See Rehab H&P on Rehab chart dated . Vital signs:  /62   Pulse 73   Temp 99.1 °F (37.3 °C) (Oral)   Resp 16   SpO2 96%   I/O:   PO/Intake:  fair PO intake,  dysphagia 2 with one-on-one supervision assist for feeds    Bowel/Bladder:   Incontinent of bowel and bladder-occasional loose stools  General:  Patient is  frail and cachectic, adequately nourished, non-obese and     well kempt. HEENT:    PERRLA, hearing intact to loud voice, external inspection of ear     and nose benign. Inspection of lips, tongue and gums-severe thrush  Musculoskeletal: No significant change in strength or tone. All joints stable. Inspection and palpation of digits and nails show no clubbing,       cyanosis or inflammatory conditions. Neuro/Psychiatric: Affect: flat but pleasant.   Alert and oriented to person, place and     situation. No significant change in deep tendon reflexes or     Sensation-right hemiparesis secondary CVA  Lungs:  Diminished but CTA-B. Respiration effort is normal at rest.     Heart:   S1 = S2, RRR. No loud murmurs. Abdomen:  Soft, non-tender, no enlargement of liver or spleen. Extremities:  No significant lower extremity edema or tenderness. Skin:   Intact buttocks slightly reddened covered with Mepilex upper extremity skin tears and    left heel redness -healing left upper chest pacer incision    Rehabilitation:  Physical therapy: FIMS:  Bed Mobility: Scooting: Independent    Transfers: Sit to Stand: Supervision  Stand to sit: Supervision  Bed to Chair: Supervision  Stand Pivot Transfers: Supervision, Ambulation 1  Surface: level tile, carpet  Device: Rolling Walker  Assistance: Supervision  Quality of Gait: VC for direction. Slow steady jenna with decreased stride length with limited heel strike. Distance: 150' x2   Comments: needed seated rest break after 80 feet. poor approach to sit and wanted to lay down instead of continuing her walk. , Stairs  # Steps : 4  Stairs Height: 6\"  Rails: Bilateral  Assistance: Stand by assistance, Supervision  Comment: Reciprocal ascending and descending. Pt becomes quickly fatigued with stairs and may be holding breath during activity. Pt attempting to sit down on rollator after completing task.     FIMS: Bed, Chair, Wheel Chair: 4 - Requires steadying assistance only <25% assist  and/or requires assist with one leg only  Walk: 5 - Exception Household Locomotion Walks/operates wheelchair at least 50 feet Independently with or without a device May require an extra amount of time OR there may be a concern for safety  Distance Walked: 16  Wheel Chair: 1 - Total Assistance Walks/operates wheelchair < 50 feet OR requires two or more people OR patient performs < 25% of locomotion effort  Distance Traveled in Wheel Chair: 0  Stairs: 2- Maximal Assistance Performs 25-49% of the effort to go up and down 4 to 6 stairs and requires the assistance of one person only,  , Assessment: Pt able to maintain static standing x 2 min without LOB and minimal trunk sway noted while standing in front of mirrow     Occupational therapy: FIMS:  Eatin - Feeds self with setup/supervision/cues and/or requires only setup/supervision/cues to perform tube feedings  Groomin - Did not occur  Bathin - Did not occur  Dressing-Upper: 0 - Did not occur  Dressing-Lower: 0 - Did not occur  Toileting: 3 - Able to perform 2 tasks  Toilet Transfer: 4 - Requires steadying assistance only < 25% assist  Tub Transfer: 0 - Activity does not occur  Shower Transfer: 0 - Activity does not occur,  , Assessment: Pt's limited cognition and safety awareness are pt's largest barriers to increased independence with ADLs and basic IADL tasks. Pt would continue to benefit from skilled therapy services to address noted deficits and improve overall safety and independence in daily living.      Speech therapy: FIMS: Comprehension: 2 - Patient understands basic needs 25-49% of the time  Expression: 2 - Expresses basic ideas/needs 25-49% of the time  Social Interaction: 4 - Patient appropriate 75-90%+ of the time  Problem Solvin - Patient solves simple/routine tasks < 25%  Memory: 1 - Patient remembers < 25% of the time      Lab/X-ray studies reviewed, analyzed and discussed with patient and staff:   Recent Results (from the past 24 hour(s))   Urinalysis    Collection Time: 07/10/18  8:46 AM   Result Value Ref Range    Color, UA Yellow Straw/Yellow    Clarity, UA Clear Clear    Glucose, Ur Negative Negative mg/dL    Bilirubin Urine Negative Negative    Ketones, Urine Negative Negative mg/dL    Specific Gravity, UA 1.011 1.005 - 1.030    Blood, Urine Negative Negative    pH, UA 6.5 5.0 - 9.0    Protein, UA Negative Negative mg/dL    Urobilinogen, Urine 0.2 <2.0 E.U./dL    Nitrite, Urine Negative

## 2018-07-11 NOTE — PROGRESS NOTES
Pt incontinent of urine and stool at this time. Rosie care performed. Pt denying need to be toileted at this time.

## 2018-07-11 NOTE — PROGRESS NOTES
verbal, and tactile cues. Pt was unable to close tabbed containers d/t decreased hand strength. Cognition  Overall Cognitive Status: Exceptions  Arousal/Alertness: Inconsistent responses to stimuli  Following Commands: Follows one step commands with repetition; Follows multistep commands with repitition; Follows multistep commands with increased time  Attention Span: Difficulty attending to directions; Difficulty dividing attention  Problem Solving: Decreased awareness of errors  Insights: Decreased awareness of deficits  Initiation: Requires cues for all  Sequencing: Requires cues for all   Pt engaged in cognitive activities using a large peg board this day. Pt was able to follow one-step directions (\"fill this row with green pegs\" or \"remove all the red pegs\") with no difficulty. When pt prompted to \"fill this row with green AND blue pegs\" pt was unable to comprehend directions despite cues. To improve cognition for safe ADL completion. Assessment   Activity Tolerance  Activity Tolerance: Patient Tolerated treatment well  Activity Tolerance: Max cues for initiation. Safety Devices  Safety Devices in place: Yes  Type of devices: All fall risk precautions in place          Plan   Plan  Times per week:  minutes per day, 5-7 days per week  Plan weeks: 2-3 weeks   Current Treatment Recommendations: Strengthening, ROM, Balance Training, Functional Mobility Training, Endurance Training, Neuromuscular Re-education, Cognitive Reorientation, Safety Education & Training, Patient/Caregiver Education & Training, Equipment Evaluation, Education, & procurement, Self-Care / ADL, Home Management Training, Cognitive/Perceptual Training  Plan Comment: Continue per OT POC     Goals  Patient Goals   Patient goals : \"Take care of myself. \"        Therapy Time   Individual Concurrent Group Co-treatment   Time In 1130         Time Out 1200         Minutes 30           Electronically signed by ALLISON Miller on 7/11/2018 at

## 2018-07-11 NOTE — PROGRESS NOTES
comprehend how to correctly execute task without physical A.)  Toileting: Moderate assistance (Pt demo'd good dynamic standing balance to pull pants up and down. Pt required max verbal and tactile cues to sequence task. Pt required assistance for thoroughness for mahsa and buttocks hygiene.)  Additional Comments: Pt required total A to doff soiled brief and tamara new brief; barrier cream applied to buttocks d/t severe redness on buttocks and upper thighs noted. Pt demo'd significant confusion during ADLs and required max A to sequence all tasks. D/t decreased cognition, ADLs required increased time to complete. Cognition  Overall Cognitive Status: Exceptions  Arousal/Alertness: Inconsistent responses to stimuli  Following Commands: Follows one step commands with repetition  Attention Span: Difficulty attending to directions; Difficulty dividing attention  Memory: Decreased short term memory;Decreased long term memory;Decreased recall of precautions;Decreased recall of biographical Information;Decreased recall of recent events  Safety Judgement: Decreased awareness of need for assistance;Decreased awareness of need for safety  Problem Solving: Decreased awareness of errors  Insights: Decreased awareness of deficits  Initiation: Requires cues for all  Sequencing: Requires cues for all  Cognition Comment: Pt required minimal assistance to correct sequence days of the week cards, and moderate assistance to sequence months of the years with increased time. Pt uanble to recognize errors in sequencing with verbal and visual cues. Pt able to recall current month is \"July\" without prompting, but unable to recall year. Assessment   Assessment: Pt's limited cognition and safety awareness are pt's largest barriers to increased independence with ADLs and basic IADL tasks. Pt would continue to benefit from skilled therapy services to address noted deficits and improve overall safety and independence in daily living. REQUIRES OT FOLLOW UP: Yes  Activity Tolerance  Activity Tolerance: Treatment limited secondary to decreased cognition  Activity Tolerance: Max cues for initiation. Safety Devices  Safety Devices in place: Yes  Type of devices: All fall risk precautions in place          Plan   Plan  Times per week:  minutes per day, 5-7 days per week  Plan weeks: 2-3 weeks   Current Treatment Recommendations: Strengthening, ROM, Balance Training, Functional Mobility Training, Endurance Training, Neuromuscular Re-education, Cognitive Reorientation, Safety Education & Training, Patient/Caregiver Education & Training, Equipment Evaluation, Education, & procurement, Self-Care / ADL, Home Management Training, Cognitive/Perceptual Training  Plan Comment: Continue per OT POC     Goals  Patient Goals   Patient goals : \"Take care of myself. \"        Therapy Time   Individual Concurrent Group Co-treatment   Time In 1100         Time Out 1130         Minutes 2129 [facility]  Electronically signed by Ingrid Garcia OT on 7/11/2018 at 12:24 PM

## 2018-07-11 NOTE — PROGRESS NOTES
place;Gait belt      Therapy Time:   Individual   Time In 0900   Time Out 0930   Minutes 30     Minutes:30      Transfer/Bed mobility training: 10      Gait trainin      Neuro re education:     Therapeutic ex:      Syeda Bennett PTA, 18 at 12:18 PM

## 2018-07-11 NOTE — PROGRESS NOTES
Speech Language Pathology  [facility]  Speech Language/Pathology  Rehab Daily Note                  Anahi Jurado  7/11/2018    Rehab Dx/Hx: Right hemiparesis (HCC) [G81.91]  Abnormality of gait and mobility [R26.9]    Precautions: falls    ST Dx: [] Aphasia  [] Dysarthria  [] Apraxia   [x] Oropharyngeal dysphagia              [x] Cognitive Impairment  [] Other:     Date of Admit: 6/25/2018  Room #: R251/R251-01    Time in: 1976 Time out: 1100  Swallow: [id number]  Cognition: [id number]  ST arrived late secondary to previous session running over    Subjective:  Behavior: [x] Alert  [x] Cooperative  [x]  Pleasant  [x] Confused  [] Agitated  [] Uncooperative  [x] Distractible [] Motivated  [] Self-Limiting [] Anxious   [] Other:    Endurance:  [x] Adequate for participation in SLP sessions  [] Reduced overall   [] Lethargic  [] Other:          Interventions used this date:  [] Speech Treatment       [] Expressive Language  [] Receptive Language   [] Dysphagia Treatment   [x] Cognitive Skill Lester  [] Oral Motor  [] Voice Treatment       []  AAC     [] ESTIM  [] Speech production       [x] Therapeutic Meal Monitor         [x] Instruction in compensatory strategies     Objective/Assessment:  Cognitive-communicative goals:  Goal 1: Pt will follow conditional directions with 70% accuracy with min cues to increase the pt's ability to follow directions in the home setting with new and familiar situations. Not targeted     Goal 2: Pt will name 10/10 items in a concrete category with min cues to promote semantic organization and help the patient express her basic personal, safety, and medical wants and needs. To assess carry over from previous date, pt was asked to name 5 items in a concrete category. She was able to name-  3 animals named yesterday. Only 2 named today  2 clothing items named yesterday. Only 1 named today  2 fruits named yesterday. Only 1 named today  1 vegetable named yesterday.  Pt was

## 2018-07-11 NOTE — PROGRESS NOTES
difficulty and Max cues for attention to task. To improve hand strength for mgmt of ADL containers. Cognition    Overall Cognitive Status: Exceptions  Arousal/Alertness: Inconsistent responses to stimuli  Following Commands: Follows one step commands with increased time; Follows one step commands with repetition  Attention Span: Difficulty attending to directions; Difficulty dividing attention  Memory: Decreased short term memory;Decreased long term memory;Decreased recall of precautions;Decreased recall of biographical Information;Decreased recall of recent events  Safety Judgement: Decreased awareness of need for assistance;Decreased awareness of need for safety  Problem Solving: Decreased awareness of errors  Insights: Decreased awareness of deficits  Initiation: Requires cues for all  Sequencing: Requires cues for all      Upper Extremity Function  UE Strengthing: BUE Reciprocal Strengthening: Forward and reverse, rest breaks prn, to increase BUE strength and endurance for improved transfers. Pt peddled arm bike at a very slow pace. Assessment   Assessment: Pt's limited cognition and safety awareness are pt's largest barriers to increased independence with ADLs and basic IADL tasks. Pt would continue to benefit from skilled therapy services to address noted deficits and improve overall safety and independence in daily living. REQUIRES OT FOLLOW UP: Yes  Activity Tolerance  Activity Tolerance: Treatment limited secondary to decreased cognition;Patient limited by fatigue  Activity Tolerance: Max cues for initiation. Safety Devices  Safety Devices in place: Yes  Type of devices:  All fall risk precautions in place          Plan   Plan  Times per week:  minutes per day, 5-7 days per week  Plan weeks: 2-3 weeks   Current Treatment Recommendations: Strengthening, ROM, Balance Training, Functional Mobility Training, Endurance Training, Neuromuscular Re-education, Cognitive Reorientation, Safety Education & Training, Patient/Caregiver Education & Training, Equipment Evaluation, Education, & procurement, Self-Care / ADL, Home Management Training, Cognitive/Perceptual Training  Plan Comment: Continue per OT POC     Goals  Patient Goals   Patient goals : \"Take care of myself. \"        Therapy Time   Individual Concurrent Group Co-treatment   Time In 1300         Time Out 1400         Minutes 60           Electronically signed by ALLISON Griffin on 7/11/2018 at 1:57 PM    ALLISON Griffin

## 2018-07-12 LAB
BASOPHILS ABSOLUTE: 0.1 K/UL (ref 0–0.2)
BASOPHILS RELATIVE PERCENT: 0.8 %
EOSINOPHILS ABSOLUTE: 0.2 K/UL (ref 0–0.7)
EOSINOPHILS RELATIVE PERCENT: 2.5 %
HCT VFR BLD CALC: 24.5 % (ref 37–47)
HEMOGLOBIN: 8.1 G/DL (ref 12–16)
LYMPHOCYTES ABSOLUTE: 0.7 K/UL (ref 1–4.8)
LYMPHOCYTES RELATIVE PERCENT: 10.6 %
MCH RBC QN AUTO: 29.4 PG (ref 27–31.3)
MCHC RBC AUTO-ENTMCNC: 33.2 % (ref 33–37)
MCV RBC AUTO: 88.5 FL (ref 82–100)
MONOCYTES ABSOLUTE: 0.8 K/UL (ref 0.2–0.8)
MONOCYTES RELATIVE PERCENT: 11.8 %
NEUTROPHILS ABSOLUTE: 5.1 K/UL (ref 1.4–6.5)
NEUTROPHILS RELATIVE PERCENT: 74.3 %
PDW BLD-RTO: 17.1 % (ref 11.5–14.5)
PLATELET # BLD: 385 K/UL (ref 130–400)
RBC # BLD: 2.77 M/UL (ref 4.2–5.4)
WBC # BLD: 6.8 K/UL (ref 4.8–10.8)

## 2018-07-12 PROCEDURE — 97150 GROUP THERAPEUTIC PROCEDURES: CPT

## 2018-07-12 PROCEDURE — 97535 SELF CARE MNGMENT TRAINING: CPT

## 2018-07-12 PROCEDURE — 1180000000 HC REHAB R&B

## 2018-07-12 PROCEDURE — 36415 COLL VENOUS BLD VENIPUNCTURE: CPT

## 2018-07-12 PROCEDURE — 97127 HC OT THER IVNTJ W/FOCUS COG FUNCJ: CPT

## 2018-07-12 PROCEDURE — 6370000000 HC RX 637 (ALT 250 FOR IP): Performed by: INTERNAL MEDICINE

## 2018-07-12 PROCEDURE — 99233 SBSQ HOSP IP/OBS HIGH 50: CPT | Performed by: PHYSICAL MEDICINE & REHABILITATION

## 2018-07-12 PROCEDURE — 85025 COMPLETE CBC W/AUTO DIFF WBC: CPT

## 2018-07-12 PROCEDURE — 97112 NEUROMUSCULAR REEDUCATION: CPT

## 2018-07-12 PROCEDURE — 97530 THERAPEUTIC ACTIVITIES: CPT

## 2018-07-12 PROCEDURE — 6370000000 HC RX 637 (ALT 250 FOR IP): Performed by: PHYSICAL MEDICINE & REHABILITATION

## 2018-07-12 PROCEDURE — 97116 GAIT TRAINING THERAPY: CPT

## 2018-07-12 RX ADMIN — Medication 1 DROP: at 18:39

## 2018-07-12 RX ADMIN — AMLODIPINE BESYLATE 10 MG: 10 TABLET ORAL at 12:07

## 2018-07-12 RX ADMIN — LEVOTHYROXINE SODIUM 25 MCG: 25 TABLET ORAL at 06:39

## 2018-07-12 RX ADMIN — Medication 1 DROP: at 02:22

## 2018-07-12 RX ADMIN — KETOCONAZOLE: 20 SHAMPOO TOPICAL at 08:00

## 2018-07-12 RX ADMIN — Medication: at 14:40

## 2018-07-12 RX ADMIN — HYDRALAZINE HYDROCHLORIDE 100 MG: 50 TABLET, FILM COATED ORAL at 18:37

## 2018-07-12 RX ADMIN — NITROFURANTOIN (MONOHYDRATE/MACROCRYSTALS) 100 MG: 75; 25 CAPSULE ORAL at 18:40

## 2018-07-12 RX ADMIN — ZOLPIDEM TARTRATE 2.5 MG: 5 TABLET ORAL at 23:13

## 2018-07-12 RX ADMIN — Medication 1 DROP: at 23:17

## 2018-07-12 RX ADMIN — LOSARTAN POTASSIUM 50 MG: 50 TABLET, FILM COATED ORAL at 12:06

## 2018-07-12 RX ADMIN — HYDROCHLOROTHIAZIDE 25 MG: 25 TABLET ORAL at 12:06

## 2018-07-12 RX ADMIN — METOPROLOL SUCCINATE 25 MG: 25 TABLET, EXTENDED RELEASE ORAL at 12:05

## 2018-07-12 RX ADMIN — CLOPIDOGREL BISULFATE 75 MG: 75 TABLET, FILM COATED ORAL at 12:06

## 2018-07-12 RX ADMIN — HYDRALAZINE HYDROCHLORIDE 100 MG: 50 TABLET, FILM COATED ORAL at 06:39

## 2018-07-12 RX ADMIN — Medication 100 MG: at 12:06

## 2018-07-12 RX ADMIN — Medication: at 23:17

## 2018-07-12 RX ADMIN — Medication 5000 UNITS: at 12:06

## 2018-07-12 RX ADMIN — NITROFURANTOIN (MONOHYDRATE/MACROCRYSTALS) 100 MG: 75; 25 CAPSULE ORAL at 06:39

## 2018-07-12 RX ADMIN — SIMVASTATIN 20 MG: 20 TABLET, FILM COATED ORAL at 23:13

## 2018-07-12 RX ADMIN — Medication: at 12:10

## 2018-07-12 RX ADMIN — HYDRALAZINE HYDROCHLORIDE 100 MG: 50 TABLET, FILM COATED ORAL at 23:14

## 2018-07-12 RX ADMIN — Medication 1 DROP: at 12:08

## 2018-07-12 RX ADMIN — Medication: at 02:11

## 2018-07-12 RX ADMIN — ASPIRIN 81 MG 81 MG: 81 TABLET ORAL at 12:05

## 2018-07-12 ASSESSMENT — PAIN SCALES - GENERAL
PAINLEVEL_OUTOF10: 0

## 2018-07-12 NOTE — PROGRESS NOTES
Individual   Time In 1430   Time Out 1500   Minutes 30     Minutes:30      Transfer/Bed mobility training:      Gait training: 10      Neuro re education: 20     Therapeutic ex:      Lincoln Vidal PTA, 07/12/18 at 2:59 PM

## 2018-07-12 NOTE — PROGRESS NOTES
incontinent of urine; pt. was able to toilet again in bathroom and don fresh brief with increased time and verbal cues. Pt. required doffing pants and socks and donned them seated on the toilet. Light Housekeeping  Light Housekeeping Level of Assistance: Stand by assistance;Contact guard assistance  Light Housekeeping: Pt. stood at sink x 5 minutes to wash 4 cups. Pt. required max verbal cues and therapist to turn on water for each cup to allow pt. to get the cups wet. Pt. required step by step cuing for each part of activity. Pt. attempted to sit on unlocked rollator at end of task and required verbal cues and CGA to remain upright and return to w/c prior to sitting. Pt. requires verbal cues to reach back for chair. Balance  Standing Balance: Contact guard assistance  Standing Balance  Time: Up to 5 minutes at a time  Activity: Pt. utilized B hands to engage in ball toss activity with therapist and aide. Pt. required CGA to maintain balance at times (x3) but generally was able to reach outside of base of support and bat ball back. Pt. also used B hands on dowel and had G balance and problem solving when planning to hit ball back to aide. Pt. required min A to maintain balance during this activity. Both times pt. sat without warning and without reaching back. Sit to stand: Supervision  Stand to sit: Supervision  Comment: With verbal and tactile cues for safety and sequencing  Functional Mobility  Functional - Mobility Device: 4-Wheeled Walker  Activity: To/from bathroom  Assist Level: Supervision  Functional Mobility Comments: Pt. ambulated [facility] assist and CGA in her room. Pt. then was also able to perform ambulating in therapy room with device as above with G- safety. Pt. attended without cues to surface changes and position changes.    Toilet Transfers  Toilet - Technique: Ambulating  Equipment Used: Grab bars  Toilet Transfer: Supervision  Toilet Transfers Comments: SBA from doorway to toilet with cues to initiate  Tub Transfers  Tub - Transfer From: Rolling walker  Tub - Transfer Type: To and From  Tub - Transfer To: Shower seat with back  Tub - Technique: Ambulating  Tub Transfers: Minimal assistance  Tub Transfers Comments: Pt. transferred x 2 into tub seated on shower chair from rollator level. Pt. required step by step verbal cues with increased time and min A for positioning on chair. Pt. became anxious but was able to be redirected and safely perform transfer. Bed mobility  Supine to Sit: Supervision  Scooting: Supervision  Comment: Encouragement and cues for initiation  Transfers  Sit to stand: Supervision  Stand to sit: Supervision     Cognition  Cognition Comment: Pt. engaged in bingo activity with another patient; pt. required 50% verbal cues to locate 6 numbers on large bingo card. Pt. was able to independently  numbers when therapist cued her to look for them. Assessment   Assessment: Pt. more alert in PM and more willing to attempt activities, however, requires frequent and consistent cuing. Pt. requires verbal encouragement for all activities. Activity Tolerance  Activity Tolerance: Patient Tolerated treatment well  Activity Tolerance: Max cues for initiation. Safety Devices  Safety Devices in place: Yes  Type of devices: All fall risk precautions in place          Plan   Plan  Times per week:  minutes per day, 5-7 days per week  Plan weeks: 2-3 weeks   Current Treatment Recommendations: Strengthening, ROM, Balance Training, Functional Mobility Training, Endurance Training, Neuromuscular Re-education, Cognitive Reorientation, Safety Education & Training, Patient/Caregiver Education & Training, Equipment Evaluation, Education, & procurement, Self-Care / ADL, Home Management Training, Cognitive/Perceptual Training  Plan Comment: Continue per OT POC   G-Code     OutComes Score  AM-PAC Score  Goals  Patient Goals   Patient goals : \"Take care of myself. \"        Therapy

## 2018-07-12 NOTE — PROGRESS NOTES
Subjective: The patient complains of severe  acute  right-sided weakness and aphasia and dysphagia partially relieved by  PT, OT, speech pathology and exacerbated by  overexertion and recent left cerebral infarcts. I am concerned about patients progressive depression and chronic pain issues as well as her need for Percocet for her back and neck pain. Her function and cognition seem to be improving now that she is on the [address]. She is not able to give me symptoms because of her cognitive deficits that I can tell her urinations are bothering her. I'm concerned about her chronic UTIs have added scheduled toileting improved mahsa-care and she is no longer needing the bacitracin therefore he discontinued it. He no longer needed check a C. diff however I will check it as needed. She still needs one-on-one assist for feeds. Her. Areas still red and will continue with the ET mix and use Diflucan as needed. ROS x10: The patient also complains of severely impaired mobility and activities of daily living. Otherwise no new problems with vision, hearing, nose, mouth, throat, dermal, cardiovascular, GI, , pulmonary, musculoskeletal, psychiatric or neurological. See Rehab H&P on Rehab chart dated . Vital signs:  BP (!) 145/57   Pulse 74   Temp 97 °F (36.1 °C) (Oral)   Resp 18   SpO2 95%   I/O:   PO/Intake:  fair PO intake,  dysphagia 2 with one-on-one supervision assist for feeds    Bowel/Bladder:   Incontinent of bowel and bladder- UTI-Macrobid  General:  Patient is  frail, adequately nourished, non-obese and     well kempt. HEENT:    PERRLA, hearing intact to loud voice, external inspection of ear     and nose benign. Inspection of lips, tongue and gums-severe thrush  Musculoskeletal: No significant change in strength or tone. All joints stable. Inspection and palpation of digits and nails show no clubbing,       cyanosis or inflammatory conditions.    Neuro/Psychiatric: Affect: flat but pleasant. Alert and oriented to person, place and     Situation-with mod to max cues. No significant change in deep tendon reflexes or     Sensation-right hemiparesis secondary CVA  Lungs:  Diminished but CTA-B. Respiration effort is normal at rest.     Heart:   S1 = S2, RRR. No loud murmurs. Abdomen:  Soft, non-tender, no enlargement of liver or spleen. Extremities:  No significant lower extremity edema or tenderness. Skin:   Intact buttocks slightly reddened covered with Mepilex upper extremity skin tears and    left heel redness -healing left upper chest pacer incision    Rehabilitation:  Physical therapy: FIMS:  Bed Mobility: Scooting: Independent    Transfers: Sit to Stand: Stand by assistance, Supervision  Stand to sit: Stand by assistance, Supervision  Bed to Chair: Supervision  Stand Pivot Transfers: Supervision, Ambulation 1  Surface: level tile, carpet  Device: Rollator  Assistance: Supervision  Quality of Gait: VC for direction. Slow steady jenna with decreased stride length with limited heel strike. Pt with poor attention to objects if not wearing glasses  Distance: 200'  Comments: needed seated rest break after 80 feet. poor approach to sit and wanted to lay down instead of continuing her walk. , Stairs  # Steps : 4  Stairs Height: 6\"  Rails: Bilateral  Assistance: Supervision  Comment: Reciprocal ascending and descending.        FIMS: Bed, Chair, Wheel Chair: 4 - Requires steadying assistance only <25% assist  and/or requires assist with one leg only  Walk: 5 - Exception Household Locomotion Walks/operates wheelchair at least 50 feet Independently with or without a device May require an extra amount of time OR there may be a concern for safety  Distance Walked: 8  Wheel Chair: 1 - Total Assistance Walks/operates wheelchair < 50 feet OR requires two or more people OR patient performs < 25% of locomotion effort  Distance Traveled in Wheel Chair: 0  Stairs: 2- Maximal Assistance frequent toileting, ambulate to bathroom with assistance, check post void residuals. Check for C.difficile x1 if >2 loose stools in 24 hours, continue bowel & bladder program.  Monitor bowel and bladder function. Lactinex 2 PO every AC. Hold any and all stool softeners Scheduled toileting add neurogenic bowel program.  3. Severe neck and low back pain generalized OA pain: reassess pain every shift and prior to and after each therapy session, give prn  Percocet or Tylenol, modalities prn in therapy, Lidoderm, K-pad prn.   4. Severe skin tears bilateral upper extremities -sacral redness at side to side turns in a crate mattress as well as E T-max. Skin healing at bilateral upper extremities, heel, buttocks and new pacer site and breakdown risk:  continue pressure relief program.  At [employer] shampoo to her scalp Daily skin exams and reports from nursing. Betadine to incision  5. Severe frailty and fatigue due to Nutritional and hydration deficiency:  continue to monitor I&Os, calorie counts prn, dietary consult prn. Encourage protein supplementation vitamin B12 and CoQ10  titrate Megace held DT diarrhea, add protein supplementation add one-on-one encouragement supervision for feeds  6. Acute episodic insomnia with situational adjustment disorder:  prn 2.5 mg Ambien, monitor for day time sedation. 7. Falls risk elevated due to impulsivity:  patient to use call light to get nursing assistance to get up, bed and chair alarm. 8. Elevated DVT risk: progressive activities in PT, continue prophylaxis MELISSA hose, elevation and  consult neurology and cardiology to find with blood thinners she can tolerate-she is currently taking aspirin and Plavix  9. Complex discharge planning:  Final team meeting this Thursday. Discharge July 15, 2018 home with daughter who works and home health care PT OT RN aide  and speech therapist as well as a rolling walker   Patient and family education is in progress.   The

## 2018-07-12 NOTE — PROGRESS NOTES
Occupational Therapy  Facility/Department: [facility]  Daily Treatment Note  NAME: Kwan Castano  : 1933  MRN: [id number]    Date of Service: 2018    Discharge Recommendations:  Continue to assess pending progress       Patient Diagnosis(es): There were no encounter diagnoses. has a past medical history of HTN (hypertension) and Hypothyroidism. has no past surgical history on file. Restrictions  Restrictions/Precautions  Restrictions/Precautions: Modified Diet, Fall Risk  Position Activity Restriction  Other position/activity restrictions: Pacemaker precautions  Subjective   General  Chart Reviewed: Yes  Patient assessed for rehabilitation services?: Yes  Response to previous treatment: Patient with no complaints from previous session  Family / Caregiver Present: No  Referring Practitioner: Dr. Reuben Hollingsworth  Diagnosis: Right hemiparesis secondary to left cerebral infarct   Pre Treatment Pain Screening  Pain at present: 0  Scale Used: Numeric Score  Intervention List: Patient able to continue with treatment  Pain Assessment  Patient Currently in Pain: No  Pain Assessment: 0-10  Pain Level: 0  Vital Signs  Patient Currently in Pain: No   Orientation     Objective     Coordination  Fine Motor: Pt. completed green theraputty task, removing 12 beads from ptuty to improve hand strength for ADL fasteners and containers. Pt. required frequent encouragement and redirection to task. Cognition  Cognition Comment: Pt. engaged in cognitive activity continuing task from previous therapist. Pt. was able to finish filling board with colored pegs in row with only 1 cue for next color. When asked to remove pegs by groups of 3 pt. was able to complete 25% of the time wtihout cues. Remaining 75% of the time pt. required therapist to count for her. She also required therapist to stop her from putting in only 1 or 2 pegs if she reached the end of the row and was unable to complete remaining 1-2 of same color.  Pt.

## 2018-07-12 NOTE — PROGRESS NOTES
finger-nose-finger was intact without dysmetria. GAIT: Gait was not testable  Like to be in a fetal position      Medications:    amLODIPine  10 mg Oral Daily    levothyroxine  25 mcg Oral Daily    hydrALAZINE  100 mg Oral 3 times per day    clopidogrel  75 mg Oral Daily    aspirin  81 mg Oral Daily    sodium chloride  250 mL Intravenous Once    losartan  50 mg Oral Daily    metoprolol succinate  25 mg Oral Daily    hydrochlorothiazide  25 mg Oral Daily    vitamin D  5,000 Units Oral Daily    piperacillin-tazobactam  3.375 g Intravenous Q8H    sodium chloride flush  10 mL Intravenous 2 times per day    azithromycin  500 mg Intravenous Q24H    sodium chloride  500 mL Intra-arterial Once    docusate sodium  100 mg Oral BID    simvastatin  20 mg Oral Nightly     Continuous Infusions:    PRN Meds:sodium chloride flush, oxyCODONE-acetaminophen, sodium chloride flush, hydrALAZINE, acetaminophen, magnesium hydroxide, ondansetron    LABS  CBC:   Recent Labs      06/21/18   2307  06/22/18   0512  06/24/18   0502   WBC  7.5  8.2  10.6   HGB  11.0*  10.8*  10.4*   PLT  237  243  303     BMP:    Recent Labs      06/22/18   0512  06/23/18   0458  06/24/18   0502   NA  132  132  132   K  4.3  4.2  5.1   CL  101  98  94*   CO2  20*  19*  24   BUN  21  16  21   CREATININE  0.63  0.62  0.74   GLUCOSE  88  105  118*     TSH:    No results for input(s): TSH in the last 72 hours. B12:    No results for input(s): Glee Comfort in the last 72 hours. Vit. D:   No results for input(s): VITD25 in the last 72 hours. Lipids: No results found for: CHOL  No results found for: TRIG  No results found for: HDL  No results found for: LDLCHOLESTEROL, LDLCALC  No results found for: LABVLDL, VLDL  No results found for: CHOLHDLRATIO    Ammonia:No results for input(s): AMMONIA in the last 72 hours.   LFT:   Recent Labs      06/24/18   0502   AST  20   ALT  9   BILITOT  0.5   ALKPHOS  74        Urine:   No results for input(s): [place], GLUCOSEU, BLOODU, PHUR, PROTEINU, UROBILINOGEN, LEUKOCYTESUR in the last 72 hours.     Invalid input(s):  Rosalia Espinoza,  SPECGRAV,  NITRU     Assessment/Plan:    Likely her LOC was cardiac in origin  Currently not delirious, With slow improvement  We will continue to monitor her clinical progress  Can repeat CT head on  6/20 depending on patients hemodynamic stability  EEG done showed diffuse slowing  Continue supportive measures  Lab work showed elevated TSH, to watch  Vitamin D level was low we shall supplement  Anemia being washed her    On Plavix and aspirin   Patient is improving  Pt low level in fuction    Cathy Gutiérrez MD

## 2018-07-12 NOTE — CARE COORDINATION
Pt was ordered a rollator from [employer] which will be delivered before pt is discharged on 7/15/18/  Will notify dtr of the need for pt to have a tub chair and a hand held shower and possibly a bidet for good mahsa hygiene.     Electronically signed by Nilton Vital RN on 7/12/2018 at 1:22 PM

## 2018-07-12 NOTE — PROGRESS NOTES
Physical Therapy Rehab Treatment Note  Facility/Department: [facility]  Room: R251/R251-01       NAME: Samantha Rodriguez  : 1933 (80 y.o.)  MRN: [id number]  CODE STATUS: Limited    Date of Service: 2018  Chart Reviewed: Yes  Family / Caregiver Present: No    Restrictions:  Restrictions/Precautions: Modified Diet, Fall Risk  Position Activity Restriction  Other position/activity restrictions: Pacemaker precautions       SUBJECTIVE: Subjective: I didn't sleep good last night. \"what day is it? \"  Response To Previous Treatment: Patient with no complaints from previous session. Pain Screening  Patient Currently in Pain: Denies  Pre Treatment Pain Screening  Pain at present: 0  Scale Used: Numeric Score  Intervention List: Patient able to continue with treatment         OBJECTIVE:   Overall Orientation Status: Impaired  Orientation Level: Disoriented to place; Disoriented to time;Disoriented to situation;Oriented to person                  Bed mobility  Comment: NT: Pt independent. Transfers  Sit to Stand: Supervision;Stand by assistance  Stand to sit: Supervision;Stand by assistance  Bed to Chair: Supervision;Stand by assistance  Car Transfer: Stand by assistance    Ambulation  Ambulation?: Yes  Ambulation 1  Surface: level tile;carpet  Device: Rollator  Assistance: Supervision  Quality of Gait: Slow, steady gait. Slightly flexed trunk. No LOB. Verbal cues not followed to correct trunk. Shuffling gait: verbal cues to  feet not followed. Distance: 150'  Stairs/Curb  Stairs?: Yes   Stairs  # Steps : 4  Stairs Height: 6\"  Rails: Bilateral  Assistance: Supervision  Comment: Reciprocal ascending and descending. ASSESSMENT/COMMENTS:  Poor carry over from day to day secondary to cognition. Pt requires several rest breaks secondary to dyspnea. PLAN OF CARE/Safety:   Safety Devices  Type of devices: All fall risk precautions in place; Chair alarm in place;Gait belt      Therapy Time: Individual   Time In 0900   Time Out 0930   Minutes 30     Minutes:30      Transfer/Bed mobility trainin      Gait trainin      Neuro re education: 5     Therapeutic ex:      Dasia Remdan PTA, 18 at 9:30 AM

## 2018-07-12 NOTE — PROGRESS NOTES
Patient goals : \"Take care of myself. \"        Therapy Time   Individual Concurrent Group Co-treatment   Time In     1100     Time Out     1110     Minutes     10             Sherren Sharper Deselich, OTR/L Electronically signed by MERY King/L on 1/63/58 at 3:59 PM

## 2018-07-13 PROCEDURE — 97127 HC OT THER IVNTJ W/FOCUS COG FUNCJ: CPT

## 2018-07-13 PROCEDURE — 97535 SELF CARE MNGMENT TRAINING: CPT

## 2018-07-13 PROCEDURE — 97116 GAIT TRAINING THERAPY: CPT

## 2018-07-13 PROCEDURE — 1180000000 HC REHAB R&B

## 2018-07-13 PROCEDURE — 6370000000 HC RX 637 (ALT 250 FOR IP): Performed by: INTERNAL MEDICINE

## 2018-07-13 PROCEDURE — 6370000000 HC RX 637 (ALT 250 FOR IP): Performed by: PHYSICAL MEDICINE & REHABILITATION

## 2018-07-13 PROCEDURE — 99231 SBSQ HOSP IP/OBS SF/LOW 25: CPT | Performed by: PHYSICAL MEDICINE & REHABILITATION

## 2018-07-13 PROCEDURE — 97112 NEUROMUSCULAR REEDUCATION: CPT

## 2018-07-13 PROCEDURE — 97127 HC SP THER IVNTJ W/FOCUS COG FUNCJ: CPT

## 2018-07-13 RX ADMIN — Medication 1 DROP: at 10:51

## 2018-07-13 RX ADMIN — HYDRALAZINE HYDROCHLORIDE 100 MG: 50 TABLET, FILM COATED ORAL at 05:35

## 2018-07-13 RX ADMIN — NITROFURANTOIN (MONOHYDRATE/MACROCRYSTALS) 100 MG: 75; 25 CAPSULE ORAL at 17:16

## 2018-07-13 RX ADMIN — Medication: at 22:20

## 2018-07-13 RX ADMIN — Medication: at 17:16

## 2018-07-13 RX ADMIN — CLOPIDOGREL BISULFATE 75 MG: 75 TABLET, FILM COATED ORAL at 10:48

## 2018-07-13 RX ADMIN — HYDROCHLOROTHIAZIDE 25 MG: 25 TABLET ORAL at 10:47

## 2018-07-13 RX ADMIN — LOSARTAN POTASSIUM 50 MG: 50 TABLET, FILM COATED ORAL at 10:48

## 2018-07-13 RX ADMIN — ZOLPIDEM TARTRATE 2.5 MG: 5 TABLET ORAL at 22:21

## 2018-07-13 RX ADMIN — HYDRALAZINE HYDROCHLORIDE 100 MG: 50 TABLET, FILM COATED ORAL at 22:19

## 2018-07-13 RX ADMIN — LEVOTHYROXINE SODIUM 25 MCG: 25 TABLET ORAL at 05:35

## 2018-07-13 RX ADMIN — Medication: at 07:08

## 2018-07-13 RX ADMIN — AMLODIPINE BESYLATE 10 MG: 10 TABLET ORAL at 10:48

## 2018-07-13 RX ADMIN — Medication 100 MG: at 17:11

## 2018-07-13 RX ADMIN — Medication 5000 UNITS: at 10:48

## 2018-07-13 RX ADMIN — SIMVASTATIN 20 MG: 20 TABLET, FILM COATED ORAL at 22:20

## 2018-07-13 RX ADMIN — Medication 100 MG: at 10:48

## 2018-07-13 RX ADMIN — HYDRALAZINE HYDROCHLORIDE 100 MG: 50 TABLET, FILM COATED ORAL at 17:15

## 2018-07-13 RX ADMIN — ASPIRIN 81 MG 81 MG: 81 TABLET ORAL at 10:49

## 2018-07-13 RX ADMIN — Medication 1 DROP: at 22:15

## 2018-07-13 RX ADMIN — NITROFURANTOIN (MONOHYDRATE/MACROCRYSTALS) 100 MG: 75; 25 CAPSULE ORAL at 05:35

## 2018-07-13 RX ADMIN — KETOCONAZOLE: 20 SHAMPOO TOPICAL at 10:52

## 2018-07-13 RX ADMIN — METOPROLOL SUCCINATE 25 MG: 25 TABLET, EXTENDED RELEASE ORAL at 10:47

## 2018-07-13 ASSESSMENT — PAIN SCALES - WONG BAKER
WONGBAKER_NUMERICALRESPONSE: 0
WONGBAKER_NUMERICALRESPONSE: 0

## 2018-07-13 ASSESSMENT — PAIN SCALES - GENERAL
PAINLEVEL_OUTOF10: 0
PAINLEVEL_OUTOF10: 0

## 2018-07-13 NOTE — PROGRESS NOTES
Hospitalist Progress Note      PCP: Polina Ann MD    Date of Admission: 6/25/2018    Chief Complaint:  none    Interval HPI:    Pt is a 85y. o female admitted to rehab for gait and mobility abnormality 2/2 ? CVA  with right side weakness, aphasia and probable left lacunar infarct on CT head. Pt denies any CP, SOB, N/V, fever, chills, constipation or diarrhea       Medications:  Reviewed    Infusion Medications   Scheduled Medications    nitrofurantoin (macrocrystal-monohydrate)  100 mg Oral BID AC    zolpidem  2.5 mg Oral Nightly    ketoconazole   Topical Daily    cyanocobalamin  1,000 mcg Intramuscular Weekly    coenzyme Q10  100 mg Oral BID AC    cinnamon oil  1 drop Oral TID    nystatin, stomahesive in petrolatum   Topical 3 times per day    simvastatin  20 mg Oral Nightly    losartan  50 mg Oral Daily    amLODIPine  10 mg Oral Daily    aspirin  81 mg Oral Daily    clopidogrel  75 mg Oral Daily    hydrALAZINE  100 mg Oral 3 times per day    hydrochlorothiazide  25 mg Oral Daily    levothyroxine  25 mcg Oral Daily    metoprolol succinate  25 mg Oral Daily    vitamin D  5,000 Units Oral Daily     PRN Meds: bisacodyl, acetaminophen, magnesium hydroxide, oxyCODONE-acetaminophen, hydrALAZINE    No intake or output data in the 24 hours ending 07/13/18 1631    Exam:    /70   Pulse 62   Temp 97 °F (36.1 °C)   Resp 18   SpO2 98%     General appearance: No apparent distress, appears stated age and cooperative. HEENT: Pupils equal, round, and reactive to light. Conjunctivae/corneas clear. Neck: Supple, with full range of motion. No jugular venous distention. Trachea midline. Respiratory:  Normal respiratory effort. Clear to auscultation, bilaterally without Rales/Wheezes/Rhonchi. Cardiovascular: Regular rate and rhythm with normal S1/S2 without murmurs, rubs or gallops. Abdomen: Soft, non-tender, non-distended with normal bowel sounds.   Musculoskeletal: No clubbing, cyanosis or work up or/and treatment plan may be added today or then after based on clinical progression. I am managing a portion of pt care. Some medical issues are handled by other specialists. Additional work up and treatment should be done in out pt setting by pt PCP and other out pt providers. In addition to examining and evaluating pt, I spent additional time explaining care, normal and abnormal findings, and treatment plan. All of pt questions were answered. Counseling, diet and education were  provided. Case will be discussed with nursing staff when appropriate. Family will be updated if and when appropriate.       Diet: DIET DYSPHAGIA II MECHANICALLY ALTERED; No Drinking Straw  Dietary Nutrition Supplements: Standard High Calorie Oral Supplement    Code Status: Limited    PT/OT Eval           Electronically signed by BENJY Armstrong CNP on 7/13/2018 at 4:31 PM

## 2018-07-13 NOTE — PROGRESS NOTES
Occupational Therapy  Facility/Department: [facility]  Daily Treatment Note  NAME: Jerica Gaffney  : 1933  MRN: [id number]    Date of Service: 2018    Discharge Recommendations:  Continue to assess pending progress       Patient Diagnosis(es): There were no encounter diagnoses. has a past medical history of HTN (hypertension) and Hypothyroidism. has no past surgical history on file. Restrictions  Restrictions/Precautions  Restrictions/Precautions: Modified Diet, Fall Risk  Position Activity Restriction  Other position/activity restrictions: Pacemaker precautions     Subjective   General  Chart Reviewed: Yes  Patient assessed for rehabilitation services?: Yes  Response to previous treatment: Patient with no complaints from previous session  Family / Caregiver Present: No  Referring Practitioner: Dr. Fior Collins  Diagnosis: Right hemiparesis secondary to left cerebral infarct   Pre Treatment Pain Screening  Pain at present: 0  Intervention List: Patient able to continue with treatment  Pain Assessment  Patient Currently in Pain: No  Pain Assessment: Faces  Solorio-Dejesus Pain Rating: No hurt  Vital Signs  Patient Currently in Pain: No     Objective    ADL  Grooming: Minimal assistance (for mgmt of tangles in back of hair )  UE Bathing: Setup;Supervision;Verbal cueing  LE Bathing: Minimal assistance (for thoroughness d/t bowel movement. Pt defecating during shower but unaware.)  UE Dressing: Supervision;Verbal cueing (for oientation of pull over sweater with wilson. )  LE Dressing: Supervision;Verbal cueing  Additional Comments: Max cues for encouragement, initiation, sequencing, and thoroughness. Standing Balance  Sit to stand: Supervision  Stand to sit: Supervision  Shower Transfers  Shower - Transfer From: Walker (Rollator )  Shower - Transfer Type: To and From  Shower - Transfer To:  Shower seat with back  Shower - Technique: Ambulating  Shower Transfers: Contact Guard  Shower Transfers Comments: Increased time to complete      Transfers  Stand Step Transfers: Minimal assistance (VCs for use of rollator for safety with locking brakes and sequencing)  Sit to stand: Supervision  Stand to sit: Supervision     Cognition  Overall Cognitive Status: Exceptions  Arousal/Alertness: Inconsistent responses to stimuli  Following Commands: Follows one step commands with increased time; Follows one step commands with repetition  Attention Span: Difficulty attending to directions; Difficulty dividing attention  Safety Judgement: Decreased awareness of need for assistance;Decreased awareness of need for safety  Problem Solving: Decreased awareness of errors;Assistance required to generate solutions;Assistance required to implement solutions;Assistance required to identify errors made;Assistance required to correct errors made  Insights: Decreased awareness of deficits  Initiation: Requires cues for all  Sequencing: Requires cues for all   Cognition: Comprehension: Mod A., Expression: Mod A., Social Interaction: Min A., Problem Solving: Max A., Memory: Max A.       LUE AROM (degrees)  LUE AROM : WFL  LUE General AROM: Pacemaker precautions limit shoulder flexion to 90 degrees   Left Hand AROM (degrees)  Left Hand AROM: WFL  RUE AROM (degrees)  RUE AROM : WFL  Right Hand AROM (degrees)  Right Hand AROM: WFL    Assessment   Activity Tolerance  Activity Tolerance: Patient Tolerated treatment well  Activity Tolerance: Max cues for initiation. Safety Devices  Safety Devices in place: Yes  Type of devices:  All fall risk precautions in place          Plan   Plan  Times per week:  minutes per day, 5-7 days per week  Plan weeks: 2-3 weeks   Current Treatment Recommendations: Strengthening, ROM, Balance Training, Functional Mobility Training, Endurance Training, Neuromuscular Re-education, Cognitive Reorientation, Safety Education & Training, Patient/Caregiver Education & Training, Equipment Evaluation, Education, & procurement, Self-Care / ADL, Home Management Training, Cognitive/Perceptual Training  Plan Comment: Continue per OT POC     Goals  Patient Goals   Patient goals : \"Take care of myself. \"        Therapy Time   Individual Concurrent Group Co-treatment   Time In 1100         Time Out 1200         Minutes 60           Electronically signed by ALLISON Krishna on 7/13/2018 at 2:30 PM    ALLISON Krishna

## 2018-07-13 NOTE — PROGRESS NOTES
Transfers  Tub - Transfer From: Rolling walker  Tub - Transfer Type: To and From  Tub - Transfer To: Shower seat with back  Tub - Technique: Ambulating  Tub Transfers: Minimal assistance  Tub Transfers Comments: Pt. transferred x 2 into tub seated on shower chair from rollator level. Pt. required step by step verbal cues with increased time and min A for positioning on chair. Pt. became anxious but was able to be redirected and safely perform transfer. Shower Transfers  Shower - Transfer From: [place] (Rollator )  Shower - Transfer Type: To and From  Shower - Transfer To: Shower seat with back  Shower - Technique: Ambulating  Shower Transfers: Contact Guard  Shower Transfers Comments: Increased time to complete     Orientation Status:  Orientation  Overall Orientation Status: Impaired  Orientation Level: Oriented to person, Disoriented to place, Disoriented to time, Disoriented to situation (Pt able to recalll \"July\". With cues (doctors, nurses, therapists) pt was able to state building as \"Medical office\")    Cognition Status:  Cognition  Overall Cognitive Status: Exceptions  Arousal/Alertness: Inconsistent responses to stimuli  Following Commands:  Follows one step commands with increased time, Follows one step commands with repetition  Attention Span: Difficulty attending to directions, Difficulty dividing attention  Memory: Decreased short term memory, Decreased long term memory, Decreased recall of precautions, Decreased recall of biographical Information, Decreased recall of recent events  Safety Judgement: Decreased awareness of need for assistance, Decreased awareness of need for safety  Problem Solving: Decreased awareness of errors, Assistance required to generate solutions, Assistance required to implement solutions, Assistance required to identify errors made, Assistance required to correct errors made  Insights: Decreased awareness of deficits  Initiation: Requires cues for all  Sequencing: Requires cues for all  Cognition Comment: Pt. engaged in bingo activity with another patient; pt. required 50% verbal cues to locate 6 numbers on large bingo card. Pt. was able to independently  numbers when therapist cued her to look for them. Perception Status:  Perception  Overall Perceptual Status: WFL    Sensation Status:  Sensation  Overall Sensation Status: Impaired (BUE light touch and deep pressure WFL. Mild decrease in proprioception noted)    Vision and Hearing Status:  Vision  Vision: Impaired  Vision Exceptions: Wears glasses at all times  Hearing  Hearing: Exceptions to [facility]  Hearing Exceptions: Hard of hearing/hearing concerns     UE Function Status:    ROM:   LUE AROM (degrees)  LUE AROM : WFL  LUE General AROM: Pacemaker precautions limit shoulder flexion to 90 degrees   Left Hand AROM (degrees)  Left Hand AROM: WFL  RUE AROM (degrees)  RUE AROM : WFL  Right Hand AROM (degrees)  Right Hand AROM: WFL    Strength:  LUE Strength  Gross LUE Strength: WFL  LUE Strength Comment: Decreased strength, however, pt able to complete all ADL activities without physical difficulty. RUE Strength  Gross RUE Strength: WFL  RUE Strength Comment: Decreased strength, however, pt able to complete all ADL activities without physical difficulty. Coordination, Tone, Quality of Movement:   Coordination  Coordination and Movement description: Decreased speed, Decreased accuracy    D/C Recommendations:    Equipment Recommendations:    Shower chair and bidet, family notified    OT Follow Up:  OT D/C RECOMMENDATIONS  REQUIRES OT FOLLOW UP: Yes    Home Exercise Program Provided: [] Yes [x] No  If yes, type of HEP:      Electronically signed by:     DEEDEE Benítez OTR/L  7/13/2018, 3:58 PM

## 2018-07-13 NOTE — PROGRESS NOTES
Subjective: The patient complains of severe  acute  right-sided weakness and aphasia and dysphagia partially relieved by  PT, OT, speech pathology and exacerbated by  overexertion and recent left cerebral infarcts. I am concerned about patients progressive depression and chronic pain issues as well as her need for Percocet for her back and neck pain. DVT  Is to be helping. I'm preparing for her discharge this weekend. ROS x10: The patient also complains of severely impaired mobility and activities of daily living. Otherwise no new problems with vision, hearing, nose, mouth, throat, dermal, cardiovascular, GI, , pulmonary, musculoskeletal, psychiatric or neurological. See Rehab H&P on Rehab chart dated . Vital signs:  BP (!) 170/77   Pulse 73   Temp 98 °F (36.7 °C) (Oral)   Resp 18   SpO2 95%   I/O:   PO/Intake:  fair PO intake,  dysphagia 2 with one-on-one supervision assist for feeds    Bowel/Bladder:   Incontinent of bowel and bladder- UTI-Macrobid  General:  Patient is  frail, adequately nourished, non-obese and     well kempt. HEENT:    PERRLA, hearing intact to loud voice, external inspection of ear     and nose benign. Inspection of lips, tongue and gums-severe thrush  Musculoskeletal: No significant change in strength or tone. All joints stable. Inspection and palpation of digits and nails show no clubbing,       cyanosis or inflammatory conditions. Neuro/Psychiatric: Affect: flat but pleasant. Alert and oriented to person, place and     Situation-with mod to max cues. No significant change in deep tendon reflexes or     Sensation-right hemiparesis secondary CVA  Lungs:  Diminished but CTA-B. Respiration effort is normal at rest.     Heart:   S1 = S2, RRR. No loud murmurs. Abdomen:  Soft, non-tender, no enlargement of liver or spleen. Extremities:  No significant lower extremity edema or tenderness.   Skin:   Intact buttocks slightly reddened covered with aware of the team discussion regarding their progress. Discharge plans were discussed along with barriers to progress and strategies to address these barriers, patient encouraged to continue to discuss discharge plans with . Complex Physical Medicine & Rehab Issues Assess & Plan:   1. 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 Functional and medical status reassessed regarding patients ability to participate in therapies and patient found to be able to participate in acute intensive comprehensive inpatient rehabilitation program including PT/OT to improve balance, ambulation, ADLs, and to improve the P/AROM. Therapeutic modifications regarding activities in therapies, place, amount of time per day and intensity of therapy made daily. In bed therapies or bedside therapies prn.   2. Bowel loose stools and Bladder dysfunction-bowel and Urinary incontinence:  frequent toileting, ambulate to bathroom with assistance, check post void residuals. Check for C.difficile x1 if >2 loose stools in 24 hours, continue bowel & bladder program.  Monitor bowel and bladder function. Lactinex 2 PO every AC. Hold any and all stool softeners Scheduled toileting add neurogenic bowel program.  3. Severe neck and low back pain generalized OA pain: reassess pain every shift and prior to and after each therapy session, give prn  Percocet or Tylenol, modalities prn in therapy, Lidoderm, K-pad prn.   4. Severe skin tears bilateral upper extremities -sacral redness at side to side turns in a crate mattress as well as E T-max. Skin healing at bilateral upper extremities, heel, buttocks and new pacer site and breakdown risk:  continue pressure relief program.  At [employer] shampoo to her scalp Daily skin exams and reports from nursing. Betadine to incision  5.  Severe frailty and fatigue due to Nutritional and hydration deficiency:  continue to monitor I&Os, calorie counts prn, dietary consult prn. Encourage protein supplementation vitamin B12 and CoQ10  titrate Megace held DT diarrhea, add protein supplementation add one-on-one encouragement supervision for feeds  6. Acute episodic insomnia with situational adjustment disorder:  prn 2.5 mg Ambien, monitor for day time sedation. 7. Falls risk elevated due to impulsivity:  patient to use call light to get nursing assistance to get up, bed and chair alarm. 8. Elevated DVT risk: progressive activities in PT, continue prophylaxis MELISSA hose, elevation and  consult neurology and cardiology to find with blood thinners she can tolerate-she is currently taking aspirin and Plavix  9. Complex discharge planning:  Final team meeting this Thursday. Discharge July 15, 2018 home with daughter who works and home health care PT OT RN aide  and speech therapist as well as a rolling walker   Patient and family education is in progress. The patient is to follow-up with their family physician after discharge. Complex Active General Medical Issues that complicate care Assess & Plan:        1.  acute likely aspiration related Pneumonia of both lungs due to infectious organism-patient is now on Augmentin every 12 hours for 5 days first dose was June 25, 2018, prevent  aspiration  -feeding only with assist up in chair with modified diet and consult speech and language pathology-she is on Augmentin  2. Severe protein-calorie malnutrition-dietary consult add nutritional supplementation protein and vitamin B-12-I have discontinued her Megace seemed to have been causing diarrhea  3.    Complete heart block history with recent pacemaker placement,  HTN (hypertension), MI (myocardial infarction), History of PTCA with stents-vital signs checks every shift, monitor for chest pain, dose and titrate Norvasc, Apresoline, Plavix, Hydrodiuril, Cozaar, aspirin,

## 2018-07-13 NOTE — PROGRESS NOTES
Speech Language Pathology    Speech Language Pathology  [facility]  Speech Language/Pathology  Rehab Daily Note                  Samantha Sol  7/13/2018    Rehab Dx/Hx: Right hemiparesis (HCC) [G81.91]  Abnormality of gait and mobility [R26.9]    Precautions: falls    ST Dx: [] Aphasia  [] Dysarthria  [] Apraxia   [] Oropharyngeal dysphagia              [x] Cognitive Impairment  [] Other:     Date of Admit: 6/25/2018  Room #: R251/R251-01    Time in: 5011 Time out: 1330      Subjective:  Behavior: [x] Alert  [x] Cooperative  [x]  Pleasant  [x] Confused  [] Agitated  [] Uncooperative  [x] Distractible [] Motivated  [] Self-Limiting [] Anxious   [] Other:    Endurance:  [x] Adequate for participation in SLP sessions  [] Reduced overall   [] Lethargic  [] Other:          Interventions used this date:  [] Speech Treatment       [] Expressive Language  [] Receptive Language   [] Dysphagia Treatment   [x] Cognitive Skill Lester  [] Oral Motor  [] Voice Treatment       []  AAC     [] ESTIM  [] Speech production       [] Therapeutic Meal Monitor         [] Instruction in compensatory strategies     Objective/Assessment:  Cognitive-communicative goals:  Goal 1: Pt will follow conditional directions with 70% accuracy with min cues to increase the pt's ability to follow directions in the home setting with new and familiar situations. Not targeted     Goal 2: Pt will name 10/10 items in a concrete category with min cues to promote semantic organization and help the patient express her basic personal, safety, and medical wants and needs. To assess carry over from previous date, pt was asked to name 5 items in a concrete category. She was able to name 2 items in simple concrete categories. Given list of ten common words, pt was able to identify items in common categories 50% acc with extra time and encouragement to continue.       Goal 3: Pt will sustain attention to tasks for 10 minutes with

## 2018-07-13 NOTE — PROGRESS NOTES
Physical Therapy Rehab Treatment Note  [facility]  [facility]       NAME: Ranulfo Graham  : 1933 (80 y.o.)  MRN: [id number]  CODE STATUS: Limited    Date of Service: 2018    Chart Reviewed: Yes  Family / Caregiver Present: No  General Comment  Comments: Pt presenting to therapy gym    Restrictions:  Restrictions/Precautions: Modified Diet, Fall Risk  Position Activity Restriction  Other position/activity restrictions: Pacemaker precautions     SUBJECTIVE: Subjective: \"what day is it? \"     Pre Treatment Pain Screening  Comments / Details: denies    Post Treatment Pain Screening:  Pain Assessment  Pain Assessment:  (denies)    OBJECTIVE:     Bed mobility  Rolling to Left: Independent  Rolling to Right: Independent  Supine to Sit: Independent  Sit to Supine: Supervision  Comment: verbal cues for entering bed. Pt attempting to crawl into bed hands first however not leaving enough room to pivot hips into bed, coming close to EOB when transitioning. Transfers  Sit to Stand: Supervision  Stand to sit: Supervision  Bed to Chair: Supervision;Stand by assistance  Comment: Verbal cues for safety with approach to chair at times. Practice transferring to different surfaces in different environments. Ambulation: Pt completing short ambulation drills within gym and hosp room focusing on functional tasks such as navigating to restroom, fetching items from closet or tables, etc. Pt requiring verbal cues for managing rollator throughout and for instruction for each task. Stairs  # Steps : 8  Stairs Height: 6\"  Rails: Bilateral  Assistance: Supervision  Comment: Reciprocal ascending and descending. Pt too fatigued to complete 12 steps this afternoon; requires cues for safety when transitioning to/from stairs    Activity Tolerance  Activity Tolerance: Patient limited by cognitive status; Patient Tolerated treatment well    Exercises  Comments: Seated warm up: CM/LIEN/Emily X 10 reps

## 2018-07-14 LAB
IRON SATURATION: 16 % (ref 11–46)
IRON: 36 UG/DL (ref 37–145)
TOTAL IRON BINDING CAPACITY: 223 UG/DL (ref 178–450)

## 2018-07-14 PROCEDURE — 83540 ASSAY OF IRON: CPT

## 2018-07-14 PROCEDURE — 99231 SBSQ HOSP IP/OBS SF/LOW 25: CPT | Performed by: PHYSICAL MEDICINE & REHABILITATION

## 2018-07-14 PROCEDURE — 36415 COLL VENOUS BLD VENIPUNCTURE: CPT

## 2018-07-14 PROCEDURE — 97116 GAIT TRAINING THERAPY: CPT

## 2018-07-14 PROCEDURE — 6370000000 HC RX 637 (ALT 250 FOR IP): Performed by: PHYSICAL MEDICINE & REHABILITATION

## 2018-07-14 PROCEDURE — 1180000000 HC REHAB R&B

## 2018-07-14 PROCEDURE — 97530 THERAPEUTIC ACTIVITIES: CPT

## 2018-07-14 PROCEDURE — 83550 IRON BINDING TEST: CPT

## 2018-07-14 PROCEDURE — 97535 SELF CARE MNGMENT TRAINING: CPT

## 2018-07-14 PROCEDURE — 6370000000 HC RX 637 (ALT 250 FOR IP): Performed by: INTERNAL MEDICINE

## 2018-07-14 RX ORDER — CLOPIDOGREL BISULFATE 75 MG/1
75 TABLET ORAL DAILY
Qty: 30 TABLET | Refills: 3 | Status: SHIPPED | OUTPATIENT
Start: 2018-07-15

## 2018-07-14 RX ORDER — SIMVASTATIN 20 MG
20 TABLET ORAL NIGHTLY
Qty: 30 TABLET | Refills: 3 | Status: SHIPPED | OUTPATIENT
Start: 2018-07-14

## 2018-07-14 RX ADMIN — NITROFURANTOIN (MONOHYDRATE/MACROCRYSTALS) 100 MG: 75; 25 CAPSULE ORAL at 17:08

## 2018-07-14 RX ADMIN — Medication 5000 UNITS: at 10:29

## 2018-07-14 RX ADMIN — ZOLPIDEM TARTRATE 2.5 MG: 5 TABLET ORAL at 20:34

## 2018-07-14 RX ADMIN — Medication 100 MG: at 12:32

## 2018-07-14 RX ADMIN — ASPIRIN 81 MG 81 MG: 81 TABLET ORAL at 10:29

## 2018-07-14 RX ADMIN — Medication 100 MG: at 10:28

## 2018-07-14 RX ADMIN — LOSARTAN POTASSIUM 50 MG: 50 TABLET, FILM COATED ORAL at 10:29

## 2018-07-14 RX ADMIN — KETOCONAZOLE: 20 SHAMPOO TOPICAL at 10:31

## 2018-07-14 RX ADMIN — HYDROCHLOROTHIAZIDE 25 MG: 25 TABLET ORAL at 10:29

## 2018-07-14 RX ADMIN — Medication: at 20:33

## 2018-07-14 RX ADMIN — SIMVASTATIN 20 MG: 20 TABLET, FILM COATED ORAL at 20:33

## 2018-07-14 RX ADMIN — HYDRALAZINE HYDROCHLORIDE 100 MG: 50 TABLET, FILM COATED ORAL at 20:32

## 2018-07-14 RX ADMIN — METOPROLOL SUCCINATE 25 MG: 25 TABLET, EXTENDED RELEASE ORAL at 10:29

## 2018-07-14 RX ADMIN — Medication: at 06:15

## 2018-07-14 RX ADMIN — HYDRALAZINE HYDROCHLORIDE 100 MG: 50 TABLET, FILM COATED ORAL at 06:14

## 2018-07-14 RX ADMIN — LEVOTHYROXINE SODIUM 25 MCG: 25 TABLET ORAL at 06:14

## 2018-07-14 RX ADMIN — Medication: at 14:24

## 2018-07-14 RX ADMIN — HYDRALAZINE HYDROCHLORIDE 100 MG: 50 TABLET, FILM COATED ORAL at 14:23

## 2018-07-14 RX ADMIN — NITROFURANTOIN (MONOHYDRATE/MACROCRYSTALS) 100 MG: 75; 25 CAPSULE ORAL at 06:14

## 2018-07-14 RX ADMIN — AMLODIPINE BESYLATE 10 MG: 10 TABLET ORAL at 10:29

## 2018-07-14 RX ADMIN — CLOPIDOGREL BISULFATE 75 MG: 75 TABLET, FILM COATED ORAL at 10:29

## 2018-07-14 RX ADMIN — Medication 1 DROP: at 20:19

## 2018-07-14 ASSESSMENT — PAIN SCALES - GENERAL
PAINLEVEL_OUTOF10: 0

## 2018-07-14 NOTE — PROGRESS NOTES
Subjective: The patient complains of severe  acute  right-sided weakness and aphasia and dysphagia partially relieved by  PT, OT, speech pathology and exacerbated by  overexertion and recent left cerebral infarcts. I am concerned about patients progressive depression and chronic pain issues as well as her need for Percocet for her back and neck pain. I prepare for discharge tomorrow. I examined her skin her excoriation on her buttocks is looking much better. ROS x10: The patient also complains of severely impaired mobility and activities of daily living. Otherwise no new problems with vision, hearing, nose, mouth, throat, dermal, cardiovascular, GI, , pulmonary, musculoskeletal, psychiatric or neurological. See Rehab H&P on Rehab chart dated . Vital signs:  BP (!) 174/72   Pulse 78   Temp 98 °F (36.7 °C) (Oral)   Resp 18   SpO2 95%   I/O:   PO/Intake:  fair PO intake,  dysphagia 2 with one-on-one supervision assist for feeds    Bowel/Bladder:   Incontinent of bowel and bladder- UTI-Macrobid  General:  Patient is  frail, adequately nourished, non-obese and     well kempt. HEENT:    PERRLA, hearing intact to loud voice, external inspection of ear     and nose benign. Inspection of lips, tongue and gums-severe thrush  Musculoskeletal: No significant change in strength or tone. All joints stable. Inspection and palpation of digits and nails show no clubbing,       cyanosis or inflammatory conditions. Neuro/Psychiatric: Affect: flat but pleasant. Alert and oriented to person, place and     Situation-with mod to max cues. No significant change in deep tendon reflexes or     Sensation-right hemiparesis secondary CVA  Lungs:  Diminished but CTA-B. Respiration effort is normal at rest.     Heart:   S1 = S2, RRR. No loud murmurs. Abdomen:  Soft, non-tender, no enlargement of liver or spleen. Extremities:  No significant lower extremity edema or tenderness.   Skin:   Intact buttocks slightly reddened covered with Mepilex upper extremity skin tears and    left heel redness -healing left upper chest pacer incision    Rehabilitation:  Physical therapy: FIMS:  Bed Mobility: Scooting: Supervision    Transfers: Sit to Stand: Supervision  Stand to sit: Supervision  Bed to Chair: Supervision, Stand by assistance  Stand Pivot Transfers: Supervision, Ambulation 1  Surface: level tile, carpet  Device: Rollator  Assistance: Supervision  Quality of Gait: verbal cuess for direction and management of rollator. No LOB  Distance: distance not focus - emphasis on short functional distances within room and gym to simulate home navigation  Comments: needed seated rest break after 80 feet. poor approach to sit and wanted to lay down instead of continuing her walk. , Stairs  # Steps : 8  Stairs Height: 6\"  Rails: Bilateral  Assistance: Supervision  Comment: Reciprocal ascending and descending. Pt too fatigued to complete 12 steps this afternoon; requires cues for safety when transitioning to/from stairs    FIMS: Bed, Chair, Wheel Chair: 0 - Did not occur  Walk: 5 - Supervision Requires standby supervision or cuing to walk/operate wheelchair at least 150 feet  Distance Walked: Vinny 83: 1 - Total Assistance Walks/operates wheelchair < 50 feet OR requires two or more people OR patient performs < 25% of locomotion effort  Distance Traveled in Wheel Chair: 0  Stairs: 2- Maximal Assistance Performs 25-49% of the effort to go up and down 4 to 6 stairs and requires the assistance of one person only,  , Assessment: Pt limited by confusion this am. Verbal cues required for all tasks.      Occupational therapy: FIMS:  Eating:  (did not occur)  Groomin - Did not occur  Bathin - Did not occur  Dressing-Upper: 0 - Did not occur  Dressing-Lower: 0 - Did not occur  Toiletin - Did not occur  Toilet Transfer: 0 - Did not occur  Tub Transfer: 0 - Activity does not occur  Shower Transfer: 0 - Activity does hours, continue bowel & bladder program.  Monitor bowel and bladder function. Lactinex 2 PO every AC. Hold any and all stool softeners Scheduled toileting add neurogenic bowel program.  3. Severe neck and low back pain generalized OA pain: reassess pain every shift and prior to and after each therapy session, give prn  Percocet or Tylenol, modalities prn in therapy, Lidoderm, K-pad prn.   4. Severe skin tears bilateral upper extremities -sacral redness at side to side turns in a crate mattress as well as E T-max. Skin healing at bilateral upper extremities, heel, buttocks and new pacer site and breakdown risk:  continue pressure relief program.  At [employer] shampoo to her scalp Daily skin exams and reports from nursing. Betadine to incision  5. Severe frailty and fatigue due to Nutritional and hydration deficiency:  continue to monitor I&Os, calorie counts prn, dietary consult prn. Encourage protein supplementation vitamin B12 and CoQ10  titrate Megace held DT diarrhea, add protein supplementation add one-on-one encouragement supervision for feeds  6. Acute episodic insomnia with situational adjustment disorder:  prn 2.5 mg Ambien, monitor for day time sedation. 7. Falls risk elevated due to impulsivity:  patient to use call light to get nursing assistance to get up, bed and chair alarm. 8. Elevated DVT risk: progressive activities in PT, continue prophylaxis MELISSA hose, elevation and  consult neurology and cardiology to find with blood thinners she can tolerate-she is currently taking aspirin and Plavix  9. Complex discharge planning:  Final team meeting this Thursday. Discharge July 15, 2018 home with daughter who works and home health care PT OT RN aide  and speech therapist as well as a rolling walker   Patient and family education is in progress. The patient is to follow-up with their family physician after discharge.         Complex Active General Medical Issues that complicate care Assess & Plan:        1.  acute likely aspiration related Pneumonia of both lungs due to infectious organism-patient is now on Augmentin every 12 hours for 5 days first dose was June 25, 2018, prevent  aspiration  -feeding only with assist up in chair with modified diet and consult speech and language pathology-she is on Augmentin  2. Severe protein-calorie malnutrition-dietary consult add nutritional supplementation protein and vitamin B-12-I have discontinued her Megace seemed to have been causing diarrhea  3. Complete heart block history with recent pacemaker placement,  HTN (hypertension), MI (myocardial infarction), History of PTCA with stents-vital signs checks every shift, monitor for chest pain, dose and titrate Norvasc, Apresoline, Plavix, Hydrodiuril, Cozaar, aspirin, Toprol-XL, Zocor-consult hospitals for backup medical-cardiology will come in to check pacer. 4.   [place] (hard of hearing)-speech therapy to provide listening devices as tolerated  5. Hypothyroidism-Synthroid 25 µg daily  6. Right hemiparesis, Aphasia, Dysphagia, oropharyngeal phase-consult neurology monitor neurologically  7. Incontinent of urine  with acute UTI check post void residual cath if no void and continue Macrobid   8. Oropharyngeal dysphasia-dysphasia to mechanically altered foods no drinking straws consults recently which pathology-set up for feeds assist for feeds  9.  Severe thrush-cinnamon oil 4 times a day-Chava Alfonso D.O., PM&R     Attending    [address] [facility]

## 2018-07-14 NOTE — PROGRESS NOTES
Occupational Therapy  Facility/Department: [facility]  Daily Treatment Note  NAME: Domenico Garnett  : 1933  MRN: [id number]    Date of Service: 2018    Discharge Recommendations:  Continue to assess pending progress       Patient Diagnosis(es): There were no encounter diagnoses. has a past medical history of HTN (hypertension) and Hypothyroidism. has no past surgical history on file. Restrictions  Restrictions/Precautions  Restrictions/Precautions: Modified Diet, Fall Risk  Position Activity Restriction  Other position/activity restrictions: Pacemaker precautions  Subjective   General  Chart Reviewed: Yes  Patient assessed for rehabilitation services?: Yes  Response to previous treatment: Patient with no complaints from previous session  Family / Caregiver Present: No  Referring Practitioner: Dr. Valdo Bosch  Diagnosis: Right hemiparesis secondary to left cerebral infarct   Pre Treatment Pain Screening  Pain at present: 0  Scale Used: Numeric Score  Intervention List: Patient able to continue with treatment  Pain Assessment  Patient Currently in Pain: No  Pain Assessment: 0-10  Pain Level: 0  Vital Signs  Patient Currently in Pain: No   Orientation     Objective      Pt. engaged in repetitive reaching task to improve coordination and sequencing for ADLs. Pt. was able to place 60 large pegs in board with frequent cues (every other row of 10) in order to recall activity and continue placing pegs. Pt. asked x3 during session \"what day is it? \" Therapist reoriented pt. to day and location. Pt. reports understanding. ADL  Toileting: Moderate assistance  Additional Comments: Pt. required hygiene assist x 2 separate toileting sessions during this tx due to pt. having soft stool. Pt. aware of need to toilet only after her brief is soiled but she was able to communicate that she was wet; pt. then had further continent toileting when on toilet, however, pt. unaware that she was going at that time.

## 2018-07-14 NOTE — PROGRESS NOTES
Physical Therapy Rehab Treatment Note  Facility/Department: [facility] (formerly [facility])[3]  Room: R251R251-01       NAME: Jah Decker  : 1933 (80 y.o.)  MRN: [id number]  CODE STATUS: Limited    Date of Service: 2018  Chart Reviewed: Yes  Family / Caregiver Present: No  General Comment  Comments: agreeable to tx    Restrictions:  Restrictions/Precautions: Modified Diet, Fall Risk  Position Activity Restriction  Other position/activity restrictions: Pacemaker precautions       SUBJECTIVE: Subjective: \"what day is it? \"  Pain Screening  Patient Currently in Pain: No  Pre Treatment Pain Screening  Pain at present: 0  Scale Used: Numeric Score    Post Treatment Pain Screening:  Pain Assessment  Pain Assessment: 0-10  Pain Level: 0    OBJECTIVE:   Overall Orientation Status: Impaired  Orientation Level: Oriented to person    Transfers  Sit to Stand: Supervision  Stand to sit: Supervision  Comment: verbal cues for safe technique    Ambulation  Ambulation?: Yes  Ambulation 1  Surface: carpet;level tile  Device: Rollator  Assistance: Supervision;Stand by assistance  Quality of Gait: kyphosis, inconsistent steps, nbos, no lob with change in direction  Distance: 150 feet  Comments: pt had instance of esthela knee buckling, able to control and not lose balance  Stairs/Curb  Stairs? :  (refused adamently)    hard to keep pt focused on task, poor tolerance of session. ASSESSMENT/COMMENTS:  Assessment: pt hard to keep focused on task. refused adamantly to perform steps. states she has a lot of step at home but didn't want to practice them. PLAN OF CARE/Safety:   Safety Devices  Type of devices:  All fall risk precautions in place      Therapy Time:   Individual   Time In 1530   Time Out 1545   Minutes 15     Minutes:15      Transfer/Bed mobility trainin      Gait training:10      Neuro re education:     Therapeutic ex:      Sandie Rangel PTA, 18 at 3:50 PM

## 2018-07-14 NOTE — PROGRESS NOTES
Progress Note  NEUROLOGY  [facility] 1W Telemetry       Patient: Perry Smith  Unit/Bed: R122/Y600-76  YOB: 1933  MRN: [id number]  Acct: [de-identified]   Admitting Diagnosis: Stroke-like symptom [R29.90]  Stroke-like symptom [R29.90]  Admit Date:  6/26/2018  Hospital Day: 2  Limited consilt    Subjective:   Pt seen, chart reviewed  Discussed with nurse  More wakeful, still confused, but more alert today, interacting with the family better  Verbalizing more   Needs a lot of coaxing to eat and drink  Patient to resume aspirin and Plavix once awaiting issues are resolved probably tomorrow  Needed blood transfusion for the anemia  Small new stroke on the CT scan the brain  Anticoagulation cannot be started, we shall watch the patient  Speech is more clear  No nausea vomiting  Patient Seen, Chart, Labs, Radiology studies, and Consults reviewed. Patient moving all extremities are  Had a pacemaker implanted without any problem    /60   Pulse 90   Temp 98 °F (36.7 °C) (Oral)   Resp 18   SpO2 98%     Last data filed at 06/24/18 1725   Gross per 24 hour   Intake             1605 ml   Output                0 ml   Net             1605 ml       Physical Exam:  GENERAL APPEARANCE: No distress, alert, interactive and cooperative. /60   Pulse 90   Temp 98 °F (36.7 °C) (Oral)   Resp 18   SpO2 98%     MENTAL STATE: Does not knwo the year, states we are in  knob. Not entirely clear why she is in the hospital. Answers questions appropriately. Poor attention    CRANIAL NERVES: Are normal  MOTOR:Muscle strength was 4/5 in distal and proximal muscles in both upper and lower extremities. No fasciculations, tremor or other abnormal movements were present. REFLEXES: Unchanged    SENSORY: Sensory exam was in tact to light touch, skin very fragile pin prick not tested. COORDINATION: Coordination exam was normal. In both upper extremities, finger-nose-finger was intact without dysmetria.     GAIT: Gait LEUKOCYTESUR in the last 72 hours.     Invalid input(s):  RUTH Rhodes,  GWEN     Assessment/Plan:  syncope    Likely her LOC was cardiac in origin  Currently not delirious, With slow improvement  We will continue to monitor her clinical progress  EEG done showed diffuse slowing  Continue supportive measures  Lab work showed elevated TSH, to watch  Vitamin D level was low we shall supplement  Anemia being washed her    On Plavix and aspirin   Patient is improving  Pt low level in fuction    Rohan Russo MD

## 2018-07-15 VITALS
RESPIRATION RATE: 18 BRPM | TEMPERATURE: 98 F | DIASTOLIC BLOOD PRESSURE: 60 MMHG | HEART RATE: 73 BPM | OXYGEN SATURATION: 98 % | SYSTOLIC BLOOD PRESSURE: 134 MMHG

## 2018-07-15 PROCEDURE — 6370000000 HC RX 637 (ALT 250 FOR IP): Performed by: PHYSICAL MEDICINE & REHABILITATION

## 2018-07-15 PROCEDURE — 6370000000 HC RX 637 (ALT 250 FOR IP): Performed by: INTERNAL MEDICINE

## 2018-07-15 PROCEDURE — 99238 HOSP IP/OBS DSCHRG MGMT 30/<: CPT | Performed by: PHYSICAL MEDICINE & REHABILITATION

## 2018-07-15 PROCEDURE — 6360000002 HC RX W HCPCS: Performed by: PHYSICAL MEDICINE & REHABILITATION

## 2018-07-15 RX ORDER — LOSARTAN POTASSIUM 50 MG/1
50 TABLET ORAL DAILY
Qty: 30 TABLET | Refills: 3 | Status: ON HOLD | OUTPATIENT
Start: 2018-07-15 | End: 2019-01-04 | Stop reason: HOSPADM

## 2018-07-15 RX ORDER — HYDRALAZINE HYDROCHLORIDE 100 MG/1
100 TABLET, FILM COATED ORAL EVERY 8 HOURS SCHEDULED
Qty: 60 TABLET | Refills: 3 | Status: SHIPPED | OUTPATIENT
Start: 2018-07-15 | End: 2018-07-25 | Stop reason: ALTCHOICE

## 2018-07-15 RX ORDER — AMLODIPINE BESYLATE 10 MG/1
10 TABLET ORAL DAILY
Qty: 30 TABLET | Refills: 3 | Status: SHIPPED | OUTPATIENT
Start: 2018-07-15 | End: 2019-01-07

## 2018-07-15 RX ORDER — LEVOTHYROXINE SODIUM 0.03 MG/1
25 TABLET ORAL DAILY
Qty: 30 TABLET | Refills: 3 | Status: SHIPPED | OUTPATIENT
Start: 2018-07-15

## 2018-07-15 RX ORDER — HYDROCHLOROTHIAZIDE 25 MG/1
25 TABLET ORAL DAILY
Qty: 30 TABLET | Refills: 3 | Status: ON HOLD | OUTPATIENT
Start: 2018-07-15 | End: 2019-01-04

## 2018-07-15 RX ORDER — METOPROLOL SUCCINATE 25 MG/1
25 TABLET, EXTENDED RELEASE ORAL DAILY
Qty: 30 TABLET | Refills: 3 | Status: SHIPPED | OUTPATIENT
Start: 2018-07-15

## 2018-07-15 RX ADMIN — METOPROLOL SUCCINATE 25 MG: 25 TABLET, EXTENDED RELEASE ORAL at 10:35

## 2018-07-15 RX ADMIN — HYDRALAZINE HYDROCHLORIDE 100 MG: 50 TABLET, FILM COATED ORAL at 05:32

## 2018-07-15 RX ADMIN — LEVOTHYROXINE SODIUM 25 MCG: 25 TABLET ORAL at 05:32

## 2018-07-15 RX ADMIN — Medication 100 MG: at 13:04

## 2018-07-15 RX ADMIN — HYDRALAZINE HYDROCHLORIDE 100 MG: 50 TABLET, FILM COATED ORAL at 14:30

## 2018-07-15 RX ADMIN — CYANOCOBALAMIN 1000 MCG: 1000 INJECTION, SOLUTION INTRAMUSCULAR; SUBCUTANEOUS at 10:36

## 2018-07-15 RX ADMIN — CYANOCOBALAMIN 1000 MCG: 1000 INJECTION, SOLUTION INTRAMUSCULAR; SUBCUTANEOUS at 10:45

## 2018-07-15 RX ADMIN — Medication 100 MG: at 10:35

## 2018-07-15 RX ADMIN — ASPIRIN 81 MG 81 MG: 81 TABLET ORAL at 10:35

## 2018-07-15 RX ADMIN — NITROFURANTOIN (MONOHYDRATE/MACROCRYSTALS) 100 MG: 75; 25 CAPSULE ORAL at 05:32

## 2018-07-15 RX ADMIN — CLOPIDOGREL BISULFATE 75 MG: 75 TABLET, FILM COATED ORAL at 10:34

## 2018-07-15 RX ADMIN — HYDROCHLOROTHIAZIDE 25 MG: 25 TABLET ORAL at 10:35

## 2018-07-15 RX ADMIN — LOSARTAN POTASSIUM 50 MG: 50 TABLET, FILM COATED ORAL at 10:35

## 2018-07-15 RX ADMIN — Medication 5000 UNITS: at 10:35

## 2018-07-15 RX ADMIN — AMLODIPINE BESYLATE 10 MG: 10 TABLET ORAL at 10:35

## 2018-07-15 RX ADMIN — Medication: at 05:32

## 2018-07-15 ASSESSMENT — PAIN SCALES - GENERAL: PAINLEVEL_OUTOF10: 0

## 2018-07-15 NOTE — FLOWSHEET NOTE
Patient's dtr came to get her, we now know she was not on any medicine at home. Contacting the medical and cardiology for prescriptions.

## 2018-07-15 NOTE — PLAN OF CARE
Problem: Falls - Risk of:  Goal: Will remain free from falls  Will remain free from falls   Outcome: Ongoing      Problem: Risk for Impaired Skin Integrity  Goal: Tissue integrity - skin and mucous membranes  Structural intactness and normal physiological function of skin and  mucous membranes.    Outcome: Ongoing      Problem: IP COMMUNICATION/DYSARTHRIA  Goal: LTG - patient will improve expressive language skills to allow for communication of wants and needs in daily activities  Outcome: Ongoing    Goal: LTG - Patient will improve receptive language skills to allow for completion of daily activities  Outcome: Ongoing

## 2018-07-15 NOTE — DISCHARGE SUMMARY
Subjective: The patient complains of severe  acute  right-sided weakness and aphasia and dysphagia partially relieved by  PT, OT, speech pathology and exacerbated by  overexertion and recent left cerebral infarcts. I am concerned about patients progressive depression and chronic pain issues as well as her need for Percocet for her back and neck pain. [address] Course: The patient was admitted to the Rehabilitation Unit to address ADL and mobility deficits. The patient was enrolled in acute PT, OT program.  Weekly team meetings were held to assess functional progress toward their goals. The patient's medical issues were addressed. The patient progressed in the rehab program and is now ready for discharge. Refer to FIM scores summary report for detailed functional status. Greater than 25 minutes was spent on coordinating patients discharge including follow-up care, medications and patient/family education. ROS x10: The patient also complains of severely impaired mobility and activities of daily living. Otherwise no new problems with vision, hearing, nose, mouth, throat, dermal, cardiovascular, GI, , pulmonary, musculoskeletal, psychiatric or neurological. See Rehab H&P on Rehab chart dated . Vital signs:  /60   Pulse 76   Temp 98 °F (36.7 °C) (Oral)   Resp 18   SpO2 98%   I/O:   PO/Intake:  fair PO intake,  dysphagia 2 with one-on-one supervision assist for feeds    Bowel/Bladder:   Incontinent of bowel and bladder- UTI-Macrobid  General:  Patient is  frail, adequately nourished, non-obese and     well kempt. HEENT:    PERRLA, hearing intact to loud voice, external inspection of ear     and nose benign. Inspection of lips, tongue and gums-severe thrush  Musculoskeletal: No significant change in strength or tone. All joints stable. Inspection and palpation of digits and nails show no clubbing,       cyanosis or inflammatory conditions. Neuro/Psychiatric: Affect: flat but pleasant. Alert and oriented to person, place and     Situation-with mod to max cues. No significant change in deep tendon reflexes or     Sensation-right hemiparesis secondary CVA  Lungs:  Diminished but CTA-B. Respiration effort is normal at rest.     Heart:   S1 = S2, RRR. No loud murmurs. Abdomen:  Soft, non-tender, no enlargement of liver or spleen. Extremities:  No significant lower extremity edema or tenderness. Skin:   Intact buttocks slightly reddened covered with Mepilex upper extremity skin tears and    left heel redness -healing left upper chest pacer incision    Rehabilitation:  Physical therapy: FIMS:  Bed Mobility: Scooting: Supervision    Transfers: Sit to Stand: Supervision  Stand to sit: Supervision  Bed to Chair: Supervision, Stand by assistance  Stand Pivot Transfers: Supervision, Ambulation 1  Surface: carpet, level tile  Device: Rollator  Assistance: Supervision, Stand by assistance  Quality of Gait: kyphosis, inconsistent steps, nbos, no lob with change in direction  Distance: 150 feet  Comments: pt had instance of esthela knee buckling, able to control and not lose balance, Stairs  # Steps : 8  Stairs Height: 6\"  Rails: Bilateral  Assistance: Supervision  Comment: Reciprocal ascending and descending.  Pt too fatigued to complete 12 steps this afternoon; requires cues for safety when transitioning to/from stairs    FIMS: Bed, Chair, Wheel Chair: 7 - Patient independently transfers  Walk: [address] standby supervision or cuing to walk/operate wheelchair at least 150 feet  Distance Walked: Vinny 83: 1 - Total Assistance Walks/operates wheelchair < 50 feet OR requires two or more people OR patient performs < 25% of locomotion effort  Distance Traveled in Wheel Chair: 0  Stairs: 2- Maximal Assistance Performs 25-49% of the effort to go up and down 4 to 6 stairs and requires the assistance of one person only,  , Assessment: pt hard to [address] [place] [address]

## 2018-07-16 NOTE — PROGRESS NOTES
personal, medical, and safety needs. Long-term Goals  Timeframe for Long-term Goals: 3 weeks  Goal 1: Pt will improve her Language Abilities to a Min Assist level for effective communication with familiar and unfamiliar communication partners so they may functionally communicate and express safety/medical concerns. Goal 2: Pt will improve her Speech Intelligibility to Modified Independent level for effective communication of wants, needs, feelings, ideas, and medical/safety information with familiar and unfamiliar listeners. Goal 3: Pt will improve Cognition to a Min Assist level for safe completion of ADLs and decreased dependency upon caregivers. Short-term Goals  Timeframe for Short-term Goals: 2 weeks  Goal 1: Pt will demonstrate small bites/sips strategy for safe and efficient swallow of all presented consistencies in 5/5 given opportunities. Goal 2: Pt will trial soft (NDD III) diet textures in 10/10 opportunities with no s/s of aspiration.    Compensatory Swallowing Strategies: Upright as possible for all oral intake, Small bites/sips, Assist feed, Eat/Feed slowly, No straws     STATUS AT DISCHARGE:  DIET: Mechanical Soft/Dysphagia Level II with thin liquids  Comprehension          Expression   []7 - Independent   []7 - Indpendent   []6 - Modified Independent  []6 - Modified Independent   []5 - Supervision   []5 - Supervision   []4 - Min Assist   []4 - Min Assist   []3 - Mod Assist   []3 - Mod Assist   [x]2 - Max Assist   [x]2 - Max Assist   []1 - Dependent   []1 - Dependent    Problem Solving        Memory   []7 - Independent   []7 - Independent   []6 - Modified Independent  []6 - Modified Independent   []5 - Supervision   []5 - Supervision   []4 - Min Assist   []4 - Min Assist   []3 - Mod Assist   []3 - Mod Assist   []2 - Max Assist   []2 - Max Assist   [x]1 - Dependent   [x] 1 -Dependent     Functional Status at time of Discharge:    · Cognition: Patient demonstrates maximal cognitive deficits. · Communication: Patient demonstrates maximal communication deficits. · Language: Patient demonstrates maximal language deficits. · Motor Speech: Patient demonstrates no motor speech deficits. · Swallow: Patient demonstrates minimal dysphagia. Patient is discharged to Home with 24 hour assistance               [] Recommend continued speech therapy   [x] Speech Therapy is no longer warranted      Therapist:  Marly Chavarria.  General [place]ross, Date: 7/16/2018, Time: 10:48 AM

## 2018-07-16 NOTE — PROGRESS NOTES
Physical Therapy  Facility/Department: [facility]  Rehabilitation Discharge note    NAME: Lanny Silva  : 1933  MRN: [id number]    Date of discharge: 7/15/18        Past Medical History:   Diagnosis Date    HTN (hypertension) 2018    Hypothyroidism 2018     History reviewed. No pertinent surgical history. Restrictions  Restrictions/Precautions  Restrictions/Precautions: Modified Diet, Fall Risk  Position Activity Restriction  Other position/activity restrictions: Pacemaker precautions    Objective    Bed mobility  Bridging: Independent  Rolling to Left: Independent  Rolling to Right: Independent  Supine to Sit: Independent  Sit to Supine: Supervision  Scooting: Independent  Comment: verbal cues for entering bed. Pt attempting to crawl into bed hands first however not leaving enough room to pivot hips into bed, coming close to EOB when transitioning. Transfers  Sit to Stand: Supervision  Stand to sit: Supervision  Bed to Chair: Supervision, Stand by assistance  Stand Pivot Transfers: Supervision  Car Transfer: Stand by assistance  Comment: verbal cues for safe technique    Ambulation  Ambulation?: Yes  More Ambulation?: No  Ambulation 1  Surface: carpet, level tile  Device: Rollator  Assistance: Supervision, Stand by assistance  Quality of Gait: kyphosis, inconsistent steps, nbos, no lob with change in direction  Distance: 150 feet  Comments: pt had instance of esthela knee buckling, able to control and not lose balance  Ambulation 2  Surface - 2: carpet  Device 2: No device  Assistance 2: Contact guard assistance  Quality of Gait 2: Decreased arm swing, decreased step length, decreased gait speed. Pt fearful of falling and reaching to grab therapist, but encouraged not to. Distance: 48'  Comments: pt reported no left wrist pain rollator use and reported feeling safe and comfortable in gait with this device. Stairs/Curb  Stairs? :  (refused adamently)  Stairs  # Steps : 8  Stairs Height:

## 2018-07-25 ENCOUNTER — OFFICE VISIT (OUTPATIENT)
Dept: ENDOCRINOLOGY | Age: 83
End: 2018-07-25
Payer: MEDICARE

## 2018-07-25 VITALS — DIASTOLIC BLOOD PRESSURE: 60 MMHG | HEART RATE: 83 BPM | SYSTOLIC BLOOD PRESSURE: 144 MMHG

## 2018-07-25 DIAGNOSIS — E03.9 HYPOTHYROIDISM, UNSPECIFIED TYPE: Primary | ICD-10-CM

## 2018-07-25 PROCEDURE — 4040F PNEUMOC VAC/ADMIN/RCVD: CPT | Performed by: PHYSICIAN ASSISTANT

## 2018-07-25 PROCEDURE — 1111F DSCHRG MED/CURRENT MED MERGE: CPT | Performed by: PHYSICIAN ASSISTANT

## 2018-07-25 PROCEDURE — 1123F ACP DISCUSS/DSCN MKR DOCD: CPT | Performed by: PHYSICIAN ASSISTANT

## 2018-07-25 PROCEDURE — 1101F PT FALLS ASSESS-DOCD LE1/YR: CPT | Performed by: PHYSICIAN ASSISTANT

## 2018-07-25 PROCEDURE — G8419 CALC BMI OUT NRM PARAM NOF/U: HCPCS | Performed by: PHYSICIAN ASSISTANT

## 2018-07-25 PROCEDURE — 1036F TOBACCO NON-USER: CPT | Performed by: PHYSICIAN ASSISTANT

## 2018-07-25 PROCEDURE — G8427 DOCREV CUR MEDS BY ELIG CLIN: HCPCS | Performed by: PHYSICIAN ASSISTANT

## 2018-07-25 PROCEDURE — 99214 OFFICE O/P EST MOD 30 MIN: CPT | Performed by: PHYSICIAN ASSISTANT

## 2018-07-25 PROCEDURE — G8598 ASA/ANTIPLAT THER USED: HCPCS | Performed by: PHYSICIAN ASSISTANT

## 2018-07-25 PROCEDURE — 1090F PRES/ABSN URINE INCON ASSESS: CPT | Performed by: PHYSICIAN ASSISTANT

## 2018-07-25 PROCEDURE — G8400 PT W/DXA NO RESULTS DOC: HCPCS | Performed by: PHYSICIAN ASSISTANT

## 2018-07-25 ASSESSMENT — ENCOUNTER SYMPTOMS
ROS SKIN COMMENTS: THIN SKIN
RHINORRHEA: 0
DIARRHEA: 0
ABDOMINAL PAIN: 0
SORE THROAT: 0
NAUSEA: 0
WHEEZING: 0
EYE PAIN: 0
EYE REDNESS: 0
COUGH: 0
VOMITING: 0
SINUS PRESSURE: 0
SHORTNESS OF BREATH: 0

## 2018-07-25 NOTE — PROGRESS NOTES
Assessment:       Diagnosis Orders   1. Hypothyroidism, unspecified type  T4, Free    TSH without Reflex    Anti-Thyroglobulin Antibody         PLAN:     1. Patient was taking her Synthroid after breakfast.  Instructed her caregiver to have her take her Synthroid on an empty stomach either first thing in the morning or before bed at night. Ending. 2. Repeat laboratory studies in 2 months  3. Follow up with me in 8-9 weeks      Orders Placed This Encounter   Procedures    T4, Free     Standing Status:   Future     Standing Expiration Date:   7/25/2019    TSH without Reflex     Standing Status:   Future     Standing Expiration Date:   7/25/2019    Anti-Thyroglobulin Antibody     Standing Status:   Future     Standing Expiration Date:   7/25/2019     No orders of the defined types were placed in this encounter. No Follow-up on file. Subjective:     Chief Complaint   Patient presents with    Follow-Up from Hospital    Hypothyroidism     Vitals:    07/25/18 1055   BP: (!) 144/60   Site: Right Arm   Position: Sitting   Cuff Size: Medium Adult   Pulse: 83     Wt Readings from Last 3 Encounters:   06/25/18 86 lb (39 kg)     BP Readings from Last 3 Encounters:   07/25/18 (!) 144/60   07/15/18 134/60   06/25/18 (!) 143/54     Patient is a 20-year-old female with recent hospitalization for syncopal episode, bradycardia with pacemaker implantation, and CVA. She was transferred to our inpatient rehabilitation unit where thyroid laboratory studies were drawn and she was found to be hypothyroid. She was started on Synthroid 25 µg by mouth daily and returns today for her follow-up visit. She is accompanied by her caregiver her oldest daughter whom she lives with. They both state that her appetite is good however her nutritional intake is still rather poor. She denies nausea, vomiting, palpitations, hair loss, or tremors.         Past Medical History:   Diagnosis Date    HTN (hypertension) 6/25/2018    Allergies    History reviewed. No pertinent surgical history. Social History     Social History    Marital status:      Spouse name: N/A    Number of children: N/A    Years of education: N/A     Occupational History    Not on file. Social History Main Topics    Smoking status: Never Smoker    Smokeless tobacco: Never Used    Alcohol use No    Drug use: No      Comment: unable to answer at this time.  Sexual activity: No     Other Topics Concern    Not on file     Social History Narrative         Lives With: Daughter    Type of Home: House    Home Layout: One level    Home Access: Level entry    Bathroom Shower/Tub: Walk-in shower, Curtain    Bathroom Toilet: Standard    Home Equipment:  (Pt reported no DME)    Receives Help From: Family    ADL Assistance: Independent    Homemaking Assistance: Independent    Homemaking Responsibilities: No (Pt reported, \"My daughter does all of that. 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 \"     Leisure & Hobbies: watching TV          History reviewed. No pertinent family history. Review of Systems   Constitutional: Positive for activity change and fatigue. Negative for chills and fever. HENT: Negative for congestion, ear pain, postnasal drip, rhinorrhea, sinus pressure and sore throat. Eyes: Negative for pain and redness. Respiratory: Negative for cough, shortness of breath and wheezing. Cardiovascular: Negative for chest pain, palpitations and leg swelling. Gastrointestinal: Negative for abdominal pain, diarrhea, nausea and vomiting. Genitourinary: Negative for difficulty urinating. Musculoskeletal: Positive for arthralgias and gait problem. Skin: Negative for rash. Thin skin   Allergic/Immunologic: Negative for environmental allergies. Neurological: Negative for dizziness and headaches.

## 2018-07-27 ENCOUNTER — HOSPITAL ENCOUNTER (OUTPATIENT)
Dept: CARDIOLOGY | Age: 83
Discharge: HOME | End: 2018-07-27
Payer: MEDICARE

## 2018-07-27 PROCEDURE — 93280 PM DEVICE PROGR EVAL DUAL: CPT

## 2018-07-30 ENCOUNTER — OFFICE VISIT (OUTPATIENT)
Dept: FAMILY MEDICINE CLINIC | Age: 83
End: 2018-07-30
Payer: MEDICARE

## 2018-07-30 VITALS
BODY MASS INDEX: 14.94 KG/M2 | RESPIRATION RATE: 16 BRPM | WEIGHT: 81.2 LBS | SYSTOLIC BLOOD PRESSURE: 132 MMHG | HEIGHT: 62 IN | HEART RATE: 80 BPM | DIASTOLIC BLOOD PRESSURE: 78 MMHG

## 2018-07-30 DIAGNOSIS — R32 URINARY INCONTINENCE, UNSPECIFIED TYPE: ICD-10-CM

## 2018-07-30 DIAGNOSIS — D64.9 ANEMIA, UNSPECIFIED TYPE: ICD-10-CM

## 2018-07-30 DIAGNOSIS — E43 SEVERE PROTEIN-CALORIE MALNUTRITION (HCC): ICD-10-CM

## 2018-07-30 DIAGNOSIS — G81.91 RIGHT HEMIPARESIS (HCC): ICD-10-CM

## 2018-07-30 DIAGNOSIS — R13.12 DYSPHAGIA, OROPHARYNGEAL PHASE: ICD-10-CM

## 2018-07-30 DIAGNOSIS — I10 ESSENTIAL HYPERTENSION: ICD-10-CM

## 2018-07-30 DIAGNOSIS — H91.93 BILATERAL HEARING LOSS, UNSPECIFIED HEARING LOSS TYPE: ICD-10-CM

## 2018-07-30 DIAGNOSIS — R26.9 ABNORMALITY OF GAIT AND MOBILITY: ICD-10-CM

## 2018-07-30 DIAGNOSIS — F01.50 VASCULAR DEMENTIA WITHOUT BEHAVIORAL DISTURBANCE (HCC): ICD-10-CM

## 2018-07-30 DIAGNOSIS — R47.01 APHASIA: ICD-10-CM

## 2018-07-30 DIAGNOSIS — I21.4 NON-ST ELEVATION (NSTEMI) MYOCARDIAL INFARCTION (HCC): ICD-10-CM

## 2018-07-30 DIAGNOSIS — I44.2 COMPLETE HEART BLOCK (HCC): Primary | ICD-10-CM

## 2018-07-30 DIAGNOSIS — Z98.61 HISTORY OF PTCA: ICD-10-CM

## 2018-07-30 DIAGNOSIS — E03.9 HYPOTHYROIDISM, UNSPECIFIED TYPE: ICD-10-CM

## 2018-07-30 PROCEDURE — 0509F URINE INCON PLAN DOCD: CPT | Performed by: FAMILY MEDICINE

## 2018-07-30 PROCEDURE — 1090F PRES/ABSN URINE INCON ASSESS: CPT | Performed by: FAMILY MEDICINE

## 2018-07-30 PROCEDURE — 1123F ACP DISCUSS/DSCN MKR DOCD: CPT | Performed by: FAMILY MEDICINE

## 2018-07-30 PROCEDURE — G8400 PT W/DXA NO RESULTS DOC: HCPCS | Performed by: FAMILY MEDICINE

## 2018-07-30 PROCEDURE — 99203 OFFICE O/P NEW LOW 30 MIN: CPT | Performed by: FAMILY MEDICINE

## 2018-07-30 PROCEDURE — 1111F DSCHRG MED/CURRENT MED MERGE: CPT | Performed by: FAMILY MEDICINE

## 2018-07-30 PROCEDURE — G8598 ASA/ANTIPLAT THER USED: HCPCS | Performed by: FAMILY MEDICINE

## 2018-07-30 PROCEDURE — 1101F PT FALLS ASSESS-DOCD LE1/YR: CPT | Performed by: FAMILY MEDICINE

## 2018-07-30 PROCEDURE — G8419 CALC BMI OUT NRM PARAM NOF/U: HCPCS | Performed by: FAMILY MEDICINE

## 2018-07-30 PROCEDURE — G8427 DOCREV CUR MEDS BY ELIG CLIN: HCPCS | Performed by: FAMILY MEDICINE

## 2018-07-30 PROCEDURE — 1036F TOBACCO NON-USER: CPT | Performed by: FAMILY MEDICINE

## 2018-07-30 PROCEDURE — 4040F PNEUMOC VAC/ADMIN/RCVD: CPT | Performed by: FAMILY MEDICINE

## 2018-07-30 ASSESSMENT — PATIENT HEALTH QUESTIONNAIRE - PHQ9
1. LITTLE INTEREST OR PLEASURE IN DOING THINGS: 0
SUM OF ALL RESPONSES TO PHQ QUESTIONS 1-9: 0
SUM OF ALL RESPONSES TO PHQ9 QUESTIONS 1 & 2: 0
2. FEELING DOWN, DEPRESSED OR HOPELESS: 0

## 2018-07-30 NOTE — PROGRESS NOTES
mouth Daily 30 tablet 3    losartan (COZAAR) 50 MG tablet Take 1 tablet by mouth daily 30 tablet 3    metoprolol succinate (TOPROL XL) 25 MG extended release tablet Take 1 tablet by mouth daily 30 tablet 3    clopidogrel (PLAVIX) 75 MG tablet Take 1 tablet by mouth daily 30 tablet 3    simvastatin (ZOCOR) 20 MG tablet Take 1 tablet by mouth nightly 30 tablet 3    aspirin 81 MG chewable tablet Take 1 tablet by mouth daily 30 tablet 3     No current facility-administered medications for this visit. Past Medical History:   Diagnosis Date    Abnormality of gait and mobility due to Right hemiparesis secondary to Left Cerebral Infarcts. [facility] admit 06/25/18. 6/25/2018    This is an 80year old female who was brought to there ER with altered mental status. She was confused the morning of admission; she woke up and was sitting on the edge of the bed and fell to the side. In the ER she was noted to be hypertensive to 225;C systolic and bradycardic to 30's. She was noted to have a complete heart block with non ST elevation infarction. Taken to the cath lab and had    Anemia 6/25/2018    Complete heart block ([address] 6/26/2018    Dysphagia, oropharyngeal phase 6/26/2018    History of PTCA with stents 6/25/2018    Nikolai (hard of hearing) 6/25/2018    HTN (hypertension) 6/25/2018    Hypothyroidism 6/25/2018    Incontinent of urine 6/26/2018    MI (myocardial infarction) 6/25/2018    Pneumonia of both lungs due to infectious organism     Right hemiparesis ([address] 6/25/2018    Severe protein-calorie malnutrition ([address] 6/22/2018    Vascular dementia without behavioral disturbance 7/30/2018     History reviewed. No pertinent surgical history. History reviewed. No pertinent family history.   Social History     Social History    Marital status:      Spouse name: N/A    Number of children: N/A    Years of education: N/A     Social History Main Topics    Smoking status: Never Smoker   

## 2019-01-02 ENCOUNTER — APPOINTMENT (OUTPATIENT)
Dept: CT IMAGING | Age: 84
DRG: 757 | End: 2019-01-02
Payer: MEDICARE

## 2019-01-02 ENCOUNTER — APPOINTMENT (OUTPATIENT)
Dept: GENERAL RADIOLOGY | Age: 84
DRG: 757 | End: 2019-01-02
Payer: MEDICARE

## 2019-01-02 ENCOUNTER — HOSPITAL ENCOUNTER (INPATIENT)
Age: 84
LOS: 3 days | Discharge: NURSING, REHAB OR LONG-TERM CARE | DRG: 757 | End: 2019-01-06
Attending: EMERGENCY MEDICINE | Admitting: INTERNAL MEDICINE
Payer: MEDICARE

## 2019-01-02 DIAGNOSIS — I15.9 SECONDARY HYPERTENSION: ICD-10-CM

## 2019-01-02 DIAGNOSIS — Z78.9 DECREASED ACTIVITIES OF DAILY LIVING (ADL): Primary | ICD-10-CM

## 2019-01-02 DIAGNOSIS — L98.491: ICD-10-CM

## 2019-01-02 DIAGNOSIS — R26.0 ATAXIC GAIT: ICD-10-CM

## 2019-01-02 PROBLEM — E61.1 LOW IRON: Status: ACTIVE | Noted: 2019-01-02

## 2019-01-02 PROBLEM — I69.90 LATE EFFECTS OF CVA (CEREBROVASCULAR ACCIDENT): Status: ACTIVE | Noted: 2019-01-02

## 2019-01-02 PROBLEM — R53.1 WEAKNESS: Status: ACTIVE | Noted: 2019-01-02

## 2019-01-02 PROBLEM — E87.6 HYPOKALEMIA: Status: ACTIVE | Noted: 2019-01-02

## 2019-01-02 LAB
ALBUMIN SERPL-MCNC: 3.9 G/DL (ref 3.9–4.9)
ALP BLD-CCNC: 78 U/L (ref 40–130)
ALT SERPL-CCNC: 13 U/L (ref 0–33)
ANION GAP SERPL CALCULATED.3IONS-SCNC: 18 MEQ/L (ref 7–13)
AST SERPL-CCNC: 21 U/L (ref 0–35)
BACTERIA: NEGATIVE /HPF
BASOPHILS ABSOLUTE: 0 K/UL (ref 0–0.2)
BASOPHILS RELATIVE PERCENT: 0.2 %
BILIRUB SERPL-MCNC: 0.9 MG/DL (ref 0–1.2)
BILIRUBIN URINE: NEGATIVE
BLOOD, URINE: NEGATIVE
BUN BLDV-MCNC: 14 MG/DL (ref 8–23)
CALCIUM SERPL-MCNC: 9.3 MG/DL (ref 8.6–10.2)
CHLORIDE BLD-SCNC: 93 MEQ/L (ref 98–107)
CLARITY: CLEAR
CO2: 25 MEQ/L (ref 22–29)
COLOR: YELLOW
CREAT SERPL-MCNC: 0.62 MG/DL (ref 0.5–0.9)
EKG ATRIAL RATE: 74 BPM
EKG P AXIS: 30 DEGREES
EKG P-R INTERVAL: 176 MS
EKG Q-T INTERVAL: 490 MS
EKG QRS DURATION: 152 MS
EKG QTC CALCULATION (BAZETT): 543 MS
EKG R AXIS: -44 DEGREES
EKG T AXIS: 103 DEGREES
EKG VENTRICULAR RATE: 74 BPM
EOSINOPHILS ABSOLUTE: 0 K/UL (ref 0–0.7)
EOSINOPHILS RELATIVE PERCENT: 0.1 %
EPITHELIAL CELLS, UA: ABNORMAL /HPF (ref 0–5)
GFR AFRICAN AMERICAN: >60
GFR NON-AFRICAN AMERICAN: >60
GLOBULIN: 4.6 G/DL (ref 2.3–3.5)
GLUCOSE BLD-MCNC: 128 MG/DL (ref 74–109)
GLUCOSE URINE: NEGATIVE MG/DL
HCT VFR BLD CALC: 38.3 % (ref 37–47)
HEMOGLOBIN: 12.8 G/DL (ref 12–16)
HYALINE CASTS: ABNORMAL /HPF (ref 0–5)
KETONES, URINE: NEGATIVE MG/DL
LACTIC ACID: 2.3 MMOL/L (ref 0.5–2.2)
LACTIC ACID: 2.3 MMOL/L (ref 0.5–2.2)
LEUKOCYTE ESTERASE, URINE: NEGATIVE
LYMPHOCYTES ABSOLUTE: 0.9 K/UL (ref 1–4.8)
LYMPHOCYTES RELATIVE PERCENT: 11.4 %
MCH RBC QN AUTO: 29 PG (ref 27–31.3)
MCHC RBC AUTO-ENTMCNC: 33.4 % (ref 33–37)
MCV RBC AUTO: 87 FL (ref 82–100)
MONOCYTES ABSOLUTE: 0.6 K/UL (ref 0.2–0.8)
MONOCYTES RELATIVE PERCENT: 7.5 %
NEUTROPHILS ABSOLUTE: 6.7 K/UL (ref 1.4–6.5)
NEUTROPHILS RELATIVE PERCENT: 80.8 %
NITRITE, URINE: NEGATIVE
PDW BLD-RTO: 13.7 % (ref 11.5–14.5)
PH UA: 6.5 (ref 5–9)
PLATELET # BLD: 379 K/UL (ref 130–400)
POTASSIUM SERPL-SCNC: 3.4 MEQ/L (ref 3.5–5.1)
PROTEIN UA: 30 MG/DL
RBC # BLD: 4.41 M/UL (ref 4.2–5.4)
RBC UA: ABNORMAL /HPF (ref 0–2)
SODIUM BLD-SCNC: 136 MEQ/L (ref 132–144)
SPECIFIC GRAVITY UA: 1.02 (ref 1–1.03)
TOTAL PROTEIN: 8.5 G/DL (ref 6.4–8.1)
UROBILINOGEN, URINE: 0.2 E.U./DL
WBC # BLD: 8.3 K/UL (ref 4.8–10.8)
WBC UA: ABNORMAL /HPF (ref 0–5)
YEAST: PRESENT

## 2019-01-02 PROCEDURE — 83605 ASSAY OF LACTIC ACID: CPT

## 2019-01-02 PROCEDURE — G0378 HOSPITAL OBSERVATION PER HR: HCPCS

## 2019-01-02 PROCEDURE — 2580000003 HC RX 258: Performed by: INTERNAL MEDICINE

## 2019-01-02 PROCEDURE — 87086 URINE CULTURE/COLONY COUNT: CPT

## 2019-01-02 PROCEDURE — 70450 CT HEAD/BRAIN W/O DYE: CPT

## 2019-01-02 PROCEDURE — 80053 COMPREHEN METABOLIC PANEL: CPT

## 2019-01-02 PROCEDURE — 2500000003 HC RX 250 WO HCPCS: Performed by: NURSE PRACTITIONER

## 2019-01-02 PROCEDURE — 2580000003 HC RX 258: Performed by: NURSE PRACTITIONER

## 2019-01-02 PROCEDURE — 93010 ELECTROCARDIOGRAM REPORT: CPT | Performed by: INTERNAL MEDICINE

## 2019-01-02 PROCEDURE — 71045 X-RAY EXAM CHEST 1 VIEW: CPT

## 2019-01-02 PROCEDURE — 6360000002 HC RX W HCPCS: Performed by: NURSE PRACTITIONER

## 2019-01-02 PROCEDURE — 81001 URINALYSIS AUTO W/SCOPE: CPT

## 2019-01-02 PROCEDURE — 96372 THER/PROPH/DIAG INJ SC/IM: CPT

## 2019-01-02 PROCEDURE — 99221 1ST HOSP IP/OBS SF/LOW 40: CPT | Performed by: PHYSICAL MEDICINE & REHABILITATION

## 2019-01-02 PROCEDURE — 85025 COMPLETE CBC W/AUTO DIFF WBC: CPT

## 2019-01-02 PROCEDURE — 96374 THER/PROPH/DIAG INJ IV PUSH: CPT

## 2019-01-02 PROCEDURE — 6360000002 HC RX W HCPCS: Performed by: EMERGENCY MEDICINE

## 2019-01-02 PROCEDURE — 6370000000 HC RX 637 (ALT 250 FOR IP): Performed by: EMERGENCY MEDICINE

## 2019-01-02 PROCEDURE — 93005 ELECTROCARDIOGRAM TRACING: CPT

## 2019-01-02 PROCEDURE — 99285 EMERGENCY DEPT VISIT HI MDM: CPT

## 2019-01-02 PROCEDURE — 2580000003 HC RX 258: Performed by: EMERGENCY MEDICINE

## 2019-01-02 PROCEDURE — 6370000000 HC RX 637 (ALT 250 FOR IP): Performed by: INTERNAL MEDICINE

## 2019-01-02 PROCEDURE — 87040 BLOOD CULTURE FOR BACTERIA: CPT

## 2019-01-02 PROCEDURE — 36415 COLL VENOUS BLD VENIPUNCTURE: CPT

## 2019-01-02 RX ORDER — CLOPIDOGREL BISULFATE 75 MG/1
75 TABLET ORAL DAILY
Status: DISCONTINUED | OUTPATIENT
Start: 2019-01-02 | End: 2019-01-06 | Stop reason: HOSPADM

## 2019-01-02 RX ORDER — SODIUM CHLORIDE 0.9 % (FLUSH) 0.9 %
10 SYRINGE (ML) INJECTION PRN
Status: DISCONTINUED | OUTPATIENT
Start: 2019-01-02 | End: 2019-01-06 | Stop reason: HOSPADM

## 2019-01-02 RX ORDER — ONDANSETRON 2 MG/ML
4 INJECTION INTRAMUSCULAR; INTRAVENOUS EVERY 6 HOURS PRN
Status: DISCONTINUED | OUTPATIENT
Start: 2019-01-02 | End: 2019-01-02

## 2019-01-02 RX ORDER — FLUCONAZOLE 150 MG/1
150 TABLET ORAL ONCE
Status: DISCONTINUED | OUTPATIENT
Start: 2019-01-02 | End: 2019-01-02

## 2019-01-02 RX ORDER — FLUCONAZOLE 150 MG/1
150 TABLET ORAL ONCE
Status: COMPLETED | OUTPATIENT
Start: 2019-01-02 | End: 2019-01-02

## 2019-01-02 RX ORDER — SODIUM CHLORIDE 9 MG/ML
INJECTION, SOLUTION INTRAVENOUS CONTINUOUS
Status: DISCONTINUED | OUTPATIENT
Start: 2019-01-02 | End: 2019-01-03

## 2019-01-02 RX ORDER — 0.9 % SODIUM CHLORIDE 0.9 %
500 INTRAVENOUS SOLUTION INTRAVENOUS ONCE
Status: COMPLETED | OUTPATIENT
Start: 2019-01-02 | End: 2019-01-02

## 2019-01-02 RX ORDER — AMLODIPINE BESYLATE 10 MG/1
10 TABLET ORAL DAILY
Status: DISCONTINUED | OUTPATIENT
Start: 2019-01-02 | End: 2019-01-06 | Stop reason: HOSPADM

## 2019-01-02 RX ORDER — SODIUM CHLORIDE 0.9 % (FLUSH) 0.9 %
10 SYRINGE (ML) INJECTION EVERY 12 HOURS SCHEDULED
Status: DISCONTINUED | OUTPATIENT
Start: 2019-01-02 | End: 2019-01-06 | Stop reason: HOSPADM

## 2019-01-02 RX ORDER — SIMVASTATIN 20 MG
20 TABLET ORAL NIGHTLY
Status: DISCONTINUED | OUTPATIENT
Start: 2019-01-02 | End: 2019-01-06 | Stop reason: HOSPADM

## 2019-01-02 RX ORDER — ASPIRIN 81 MG/1
81 TABLET, CHEWABLE ORAL DAILY
Status: DISCONTINUED | OUTPATIENT
Start: 2019-01-02 | End: 2019-01-06 | Stop reason: HOSPADM

## 2019-01-02 RX ORDER — LEVOTHYROXINE SODIUM 0.03 MG/1
25 TABLET ORAL DAILY
Status: DISCONTINUED | OUTPATIENT
Start: 2019-01-02 | End: 2019-01-06 | Stop reason: HOSPADM

## 2019-01-02 RX ORDER — SODIUM CHLORIDE 9 MG/ML
INJECTION, SOLUTION INTRAVENOUS CONTINUOUS
Status: DISCONTINUED | OUTPATIENT
Start: 2019-01-02 | End: 2019-01-02

## 2019-01-02 RX ORDER — FLUCONAZOLE 100 MG/1
100 TABLET ORAL DAILY
Status: DISCONTINUED | OUTPATIENT
Start: 2019-01-03 | End: 2019-01-06 | Stop reason: HOSPADM

## 2019-01-02 RX ORDER — FLUCONAZOLE 100 MG/1
100 TABLET ORAL DAILY
Status: DISCONTINUED | OUTPATIENT
Start: 2019-01-02 | End: 2019-01-02

## 2019-01-02 RX ORDER — LOSARTAN POTASSIUM 50 MG/1
50 TABLET ORAL DAILY
Status: DISCONTINUED | OUTPATIENT
Start: 2019-01-02 | End: 2019-01-03

## 2019-01-02 RX ORDER — HYDRALAZINE HYDROCHLORIDE 20 MG/ML
10 INJECTION INTRAMUSCULAR; INTRAVENOUS ONCE
Status: COMPLETED | OUTPATIENT
Start: 2019-01-02 | End: 2019-01-02

## 2019-01-02 RX ORDER — METOPROLOL SUCCINATE 25 MG/1
25 TABLET, EXTENDED RELEASE ORAL DAILY
Status: DISCONTINUED | OUTPATIENT
Start: 2019-01-03 | End: 2019-01-06 | Stop reason: HOSPADM

## 2019-01-02 RX ADMIN — ENOXAPARIN SODIUM 40 MG: 40 INJECTION SUBCUTANEOUS at 16:03

## 2019-01-02 RX ADMIN — SODIUM CHLORIDE 500 ML: 9 INJECTION, SOLUTION INTRAVENOUS at 10:31

## 2019-01-02 RX ADMIN — MICONAZOLE NITRATE: 20 POWDER TOPICAL at 20:35

## 2019-01-02 RX ADMIN — Medication 10 ML: at 20:35

## 2019-01-02 RX ADMIN — FLUCONAZOLE 150 MG: 150 TABLET ORAL at 12:43

## 2019-01-02 RX ADMIN — SODIUM CHLORIDE: 9 INJECTION, SOLUTION INTRAVENOUS at 17:34

## 2019-01-02 RX ADMIN — HYDRALAZINE HYDROCHLORIDE 10 MG: 20 INJECTION INTRAMUSCULAR; INTRAVENOUS at 10:31

## 2019-01-02 RX ADMIN — LOSARTAN POTASSIUM 50 MG: 50 TABLET ORAL at 17:36

## 2019-01-02 RX ADMIN — SIMVASTATIN 20 MG: 20 TABLET, FILM COATED ORAL at 20:34

## 2019-01-02 RX ADMIN — SODIUM CHLORIDE: 9 INJECTION, SOLUTION INTRAVENOUS at 11:06

## 2019-01-02 RX ADMIN — ASPIRIN 81 MG 81 MG: 81 TABLET ORAL at 17:36

## 2019-01-02 ASSESSMENT — ENCOUNTER SYMPTOMS
BLOOD IN STOOL: 0
SHORTNESS OF BREATH: 1
TROUBLE SWALLOWING: 0
DIARRHEA: 0
EYE PAIN: 0
ALLERGIC/IMMUNOLOGIC NEGATIVE: 1
NAUSEA: 0
SORE THROAT: 0
VOMITING: 0
BACK PAIN: 1
SHORTNESS OF BREATH: 0
EYE REDNESS: 0
EYES NEGATIVE: 1
PHOTOPHOBIA: 0
WHEEZING: 0
COUGH: 0
RHINORRHEA: 0
ABDOMINAL PAIN: 0
STRIDOR: 0
CONSTIPATION: 1

## 2019-01-02 ASSESSMENT — PAIN SCALES - GENERAL
PAINLEVEL_OUTOF10: 0
PAINLEVEL_OUTOF10: 0

## 2019-01-03 PROBLEM — G93.41 METABOLIC ENCEPHALOPATHY: Status: ACTIVE | Noted: 2019-01-03

## 2019-01-03 LAB
ANION GAP SERPL CALCULATED.3IONS-SCNC: 12 MEQ/L (ref 7–13)
BUN BLDV-MCNC: 16 MG/DL (ref 8–23)
CALCIUM SERPL-MCNC: 8 MG/DL (ref 8.6–10.2)
CHLORIDE BLD-SCNC: 101 MEQ/L (ref 98–107)
CO2: 23 MEQ/L (ref 22–29)
CREAT SERPL-MCNC: 0.61 MG/DL (ref 0.5–0.9)
GFR AFRICAN AMERICAN: >60
GFR NON-AFRICAN AMERICAN: >60
GLUCOSE BLD-MCNC: 101 MG/DL (ref 74–109)
HCT VFR BLD CALC: 29.5 % (ref 37–47)
HEMOGLOBIN: 10 G/DL (ref 12–16)
MAGNESIUM: 1.8 MG/DL (ref 1.7–2.3)
MCH RBC QN AUTO: 29.2 PG (ref 27–31.3)
MCHC RBC AUTO-ENTMCNC: 33.7 % (ref 33–37)
MCV RBC AUTO: 86.7 FL (ref 82–100)
ORGANISM: ABNORMAL
PDW BLD-RTO: 14 % (ref 11.5–14.5)
PLATELET # BLD: 293 K/UL (ref 130–400)
POTASSIUM REFLEX MAGNESIUM: 3.3 MEQ/L (ref 3.5–5.1)
RBC # BLD: 3.41 M/UL (ref 4.2–5.4)
SODIUM BLD-SCNC: 136 MEQ/L (ref 132–144)
URINE CULTURE, ROUTINE: ABNORMAL
URINE CULTURE, ROUTINE: ABNORMAL
WBC # BLD: 7.7 K/UL (ref 4.8–10.8)

## 2019-01-03 PROCEDURE — 96372 THER/PROPH/DIAG INJ SC/IM: CPT

## 2019-01-03 PROCEDURE — 6370000000 HC RX 637 (ALT 250 FOR IP): Performed by: INTERNAL MEDICINE

## 2019-01-03 PROCEDURE — 1210000000 HC MED SURG R&B

## 2019-01-03 PROCEDURE — G8988 SELF CARE GOAL STATUS: HCPCS

## 2019-01-03 PROCEDURE — 2580000003 HC RX 258: Performed by: INTERNAL MEDICINE

## 2019-01-03 PROCEDURE — 85027 COMPLETE CBC AUTOMATED: CPT

## 2019-01-03 PROCEDURE — G8987 SELF CARE CURRENT STATUS: HCPCS

## 2019-01-03 PROCEDURE — 2580000003 HC RX 258: Performed by: NURSE PRACTITIONER

## 2019-01-03 PROCEDURE — 6360000002 HC RX W HCPCS: Performed by: NURSE PRACTITIONER

## 2019-01-03 PROCEDURE — 97165 OT EVAL LOW COMPLEX 30 MIN: CPT

## 2019-01-03 PROCEDURE — 6360000002 HC RX W HCPCS: Performed by: INTERNAL MEDICINE

## 2019-01-03 PROCEDURE — 80048 BASIC METABOLIC PNL TOTAL CA: CPT

## 2019-01-03 PROCEDURE — 83735 ASSAY OF MAGNESIUM: CPT

## 2019-01-03 PROCEDURE — 36415 COLL VENOUS BLD VENIPUNCTURE: CPT

## 2019-01-03 PROCEDURE — 2700000000 HC OXYGEN THERAPY PER DAY

## 2019-01-03 RX ORDER — POTASSIUM CHLORIDE 7.45 MG/ML
10 INJECTION INTRAVENOUS
Status: DISCONTINUED | OUTPATIENT
Start: 2019-01-03 | End: 2019-01-03

## 2019-01-03 RX ORDER — LOSARTAN POTASSIUM 50 MG/1
50 TABLET ORAL 2 TIMES DAILY
Status: DISCONTINUED | OUTPATIENT
Start: 2019-01-03 | End: 2019-01-06 | Stop reason: HOSPADM

## 2019-01-03 RX ORDER — SODIUM CHLORIDE AND POTASSIUM CHLORIDE .9; .15 G/100ML; G/100ML
SOLUTION INTRAVENOUS CONTINUOUS
Status: DISCONTINUED | OUTPATIENT
Start: 2019-01-03 | End: 2019-01-04

## 2019-01-03 RX ORDER — ACETAMINOPHEN 325 MG/1
650 TABLET ORAL EVERY 4 HOURS PRN
Status: DISCONTINUED | OUTPATIENT
Start: 2019-01-03 | End: 2019-01-06 | Stop reason: HOSPADM

## 2019-01-03 RX ORDER — POTASSIUM CHLORIDE 20 MEQ/1
20 TABLET, EXTENDED RELEASE ORAL 2 TIMES DAILY WITH MEALS
Status: COMPLETED | OUTPATIENT
Start: 2019-01-03 | End: 2019-01-04

## 2019-01-03 RX ADMIN — ASPIRIN 81 MG 81 MG: 81 TABLET ORAL at 08:33

## 2019-01-03 RX ADMIN — POTASSIUM CHLORIDE AND SODIUM CHLORIDE: 900; 150 INJECTION, SOLUTION INTRAVENOUS at 15:19

## 2019-01-03 RX ADMIN — ACETAMINOPHEN 650 MG: 325 TABLET ORAL at 01:24

## 2019-01-03 RX ADMIN — LOSARTAN POTASSIUM 50 MG: 50 TABLET ORAL at 20:03

## 2019-01-03 RX ADMIN — AMLODIPINE BESYLATE 10 MG: 10 TABLET ORAL at 08:35

## 2019-01-03 RX ADMIN — SODIUM CHLORIDE: 9 INJECTION, SOLUTION INTRAVENOUS at 06:22

## 2019-01-03 RX ADMIN — LOSARTAN POTASSIUM 50 MG: 50 TABLET ORAL at 08:33

## 2019-01-03 RX ADMIN — MICONAZOLE NITRATE: 20 POWDER TOPICAL at 20:04

## 2019-01-03 RX ADMIN — CLOPIDOGREL BISULFATE 75 MG: 75 TABLET ORAL at 08:33

## 2019-01-03 RX ADMIN — Medication 10 ML: at 08:45

## 2019-01-03 RX ADMIN — MICONAZOLE NITRATE: 20 POWDER TOPICAL at 08:36

## 2019-01-03 RX ADMIN — POTASSIUM CHLORIDE 20 MEQ: 20 TABLET, EXTENDED RELEASE ORAL at 17:11

## 2019-01-03 RX ADMIN — POTASSIUM CHLORIDE 20 MEQ: 20 TABLET, EXTENDED RELEASE ORAL at 13:51

## 2019-01-03 RX ADMIN — FLUCONAZOLE 100 MG: 100 TABLET ORAL at 08:33

## 2019-01-03 RX ADMIN — LEVOTHYROXINE SODIUM 25 MCG: 0.03 TABLET ORAL at 06:25

## 2019-01-03 RX ADMIN — ENOXAPARIN SODIUM 40 MG: 40 INJECTION SUBCUTANEOUS at 08:33

## 2019-01-03 RX ADMIN — METOPROLOL SUCCINATE 25 MG: 25 TABLET, EXTENDED RELEASE ORAL at 08:34

## 2019-01-03 RX ADMIN — SIMVASTATIN 20 MG: 20 TABLET, FILM COATED ORAL at 20:03

## 2019-01-04 LAB
ANION GAP SERPL CALCULATED.3IONS-SCNC: 11 MEQ/L (ref 7–13)
BUN BLDV-MCNC: 18 MG/DL (ref 8–23)
CALCIUM SERPL-MCNC: 8.2 MG/DL (ref 8.6–10.2)
CHLORIDE BLD-SCNC: 104 MEQ/L (ref 98–107)
CO2: 22 MEQ/L (ref 22–29)
CREAT SERPL-MCNC: 0.72 MG/DL (ref 0.5–0.9)
GFR AFRICAN AMERICAN: >60
GFR NON-AFRICAN AMERICAN: >60
GLUCOSE BLD-MCNC: 110 MG/DL (ref 74–109)
HCT VFR BLD CALC: 28 % (ref 37–47)
HEMOGLOBIN: 9.2 G/DL (ref 12–16)
MCH RBC QN AUTO: 28.7 PG (ref 27–31.3)
MCHC RBC AUTO-ENTMCNC: 32.9 % (ref 33–37)
MCV RBC AUTO: 87.3 FL (ref 82–100)
PDW BLD-RTO: 13.9 % (ref 11.5–14.5)
PLATELET # BLD: 289 K/UL (ref 130–400)
POTASSIUM REFLEX MAGNESIUM: 4 MEQ/L (ref 3.5–5.1)
RBC # BLD: 3.21 M/UL (ref 4.2–5.4)
SODIUM BLD-SCNC: 137 MEQ/L (ref 132–144)
WBC # BLD: 9.4 K/UL (ref 4.8–10.8)

## 2019-01-04 PROCEDURE — 6370000000 HC RX 637 (ALT 250 FOR IP): Performed by: INTERNAL MEDICINE

## 2019-01-04 PROCEDURE — G8978 MOBILITY CURRENT STATUS: HCPCS

## 2019-01-04 PROCEDURE — G8979 MOBILITY GOAL STATUS: HCPCS

## 2019-01-04 PROCEDURE — 36415 COLL VENOUS BLD VENIPUNCTURE: CPT

## 2019-01-04 PROCEDURE — 2580000003 HC RX 258: Performed by: NURSE PRACTITIONER

## 2019-01-04 PROCEDURE — 97162 PT EVAL MOD COMPLEX 30 MIN: CPT

## 2019-01-04 PROCEDURE — 80048 BASIC METABOLIC PNL TOTAL CA: CPT

## 2019-01-04 PROCEDURE — 6360000002 HC RX W HCPCS: Performed by: NURSE PRACTITIONER

## 2019-01-04 PROCEDURE — 1210000000 HC MED SURG R&B

## 2019-01-04 PROCEDURE — 85027 COMPLETE CBC AUTOMATED: CPT

## 2019-01-04 RX ORDER — CIPROFLOXACIN HYDROCHLORIDE 3.5 MG/ML
1 SOLUTION/ DROPS TOPICAL EVERY 6 HOURS SCHEDULED
Status: DISCONTINUED | OUTPATIENT
Start: 2019-01-04 | End: 2019-01-06 | Stop reason: HOSPADM

## 2019-01-04 RX ORDER — FLUCONAZOLE 100 MG/1
100 TABLET ORAL DAILY
Qty: 5 TABLET | Refills: 0 | DISCHARGE
Start: 2019-01-05 | End: 2019-01-10

## 2019-01-04 RX ORDER — CIPROFLOXACIN HYDROCHLORIDE 3.5 MG/ML
1 SOLUTION/ DROPS TOPICAL EVERY 6 HOURS
Qty: 20 DROP | Refills: 0 | DISCHARGE
Start: 2019-01-04 | End: 2019-01-09

## 2019-01-04 RX ORDER — LOSARTAN POTASSIUM 50 MG/1
50 TABLET ORAL 2 TIMES DAILY
Qty: 30 TABLET | Refills: 3 | DISCHARGE
Start: 2019-01-04

## 2019-01-04 RX ORDER — HYDROCHLOROTHIAZIDE 25 MG/1
12.5 TABLET ORAL DAILY
Qty: 30 TABLET | Refills: 3 | Status: SHIPPED | OUTPATIENT
Start: 2019-01-04

## 2019-01-04 RX ADMIN — POTASSIUM CHLORIDE 20 MEQ: 20 TABLET, EXTENDED RELEASE ORAL at 16:59

## 2019-01-04 RX ADMIN — SIMVASTATIN 20 MG: 20 TABLET, FILM COATED ORAL at 20:57

## 2019-01-04 RX ADMIN — ACETAMINOPHEN 650 MG: 325 TABLET ORAL at 06:39

## 2019-01-04 RX ADMIN — POTASSIUM CHLORIDE 20 MEQ: 20 TABLET, EXTENDED RELEASE ORAL at 08:30

## 2019-01-04 RX ADMIN — METOPROLOL SUCCINATE 25 MG: 25 TABLET, EXTENDED RELEASE ORAL at 08:30

## 2019-01-04 RX ADMIN — Medication 10 ML: at 08:31

## 2019-01-04 RX ADMIN — LEVOTHYROXINE SODIUM 25 MCG: 0.03 TABLET ORAL at 06:07

## 2019-01-04 RX ADMIN — AMLODIPINE BESYLATE 10 MG: 10 TABLET ORAL at 08:30

## 2019-01-04 RX ADMIN — MICONAZOLE NITRATE: 20 POWDER TOPICAL at 08:31

## 2019-01-04 RX ADMIN — LOSARTAN POTASSIUM 50 MG: 50 TABLET ORAL at 20:57

## 2019-01-04 RX ADMIN — FLUCONAZOLE 100 MG: 100 TABLET ORAL at 08:30

## 2019-01-04 RX ADMIN — LOSARTAN POTASSIUM 50 MG: 50 TABLET ORAL at 08:31

## 2019-01-04 RX ADMIN — CIPROFLOXACIN HYDROCHLORIDE 1 DROP: 3 SOLUTION/ DROPS OPHTHALMIC at 16:59

## 2019-01-04 RX ADMIN — ENOXAPARIN SODIUM 40 MG: 40 INJECTION SUBCUTANEOUS at 08:31

## 2019-01-04 RX ADMIN — ASPIRIN 81 MG 81 MG: 81 TABLET ORAL at 08:31

## 2019-01-04 RX ADMIN — Medication 10 ML: at 20:57

## 2019-01-04 RX ADMIN — CLOPIDOGREL BISULFATE 75 MG: 75 TABLET ORAL at 08:30

## 2019-01-04 RX ADMIN — MICONAZOLE NITRATE: 20 POWDER TOPICAL at 20:57

## 2019-01-04 ASSESSMENT — PAIN SCALES - GENERAL
PAINLEVEL_OUTOF10: 0
PAINLEVEL_OUTOF10: 0

## 2019-01-05 PROCEDURE — 1210000000 HC MED SURG R&B

## 2019-01-05 PROCEDURE — 6370000000 HC RX 637 (ALT 250 FOR IP): Performed by: INTERNAL MEDICINE

## 2019-01-05 PROCEDURE — 2580000003 HC RX 258: Performed by: NURSE PRACTITIONER

## 2019-01-05 PROCEDURE — 6360000002 HC RX W HCPCS: Performed by: NURSE PRACTITIONER

## 2019-01-05 RX ADMIN — ENOXAPARIN SODIUM 40 MG: 40 INJECTION SUBCUTANEOUS at 11:07

## 2019-01-05 RX ADMIN — MICONAZOLE NITRATE: 20 POWDER TOPICAL at 22:25

## 2019-01-05 RX ADMIN — ASPIRIN 81 MG 81 MG: 81 TABLET ORAL at 11:07

## 2019-01-05 RX ADMIN — Medication 10 ML: at 11:07

## 2019-01-05 RX ADMIN — MICONAZOLE NITRATE: 20 POWDER TOPICAL at 11:09

## 2019-01-05 RX ADMIN — LOSARTAN POTASSIUM 50 MG: 50 TABLET ORAL at 11:07

## 2019-01-05 RX ADMIN — FLUCONAZOLE 100 MG: 100 TABLET ORAL at 11:08

## 2019-01-05 RX ADMIN — AMLODIPINE BESYLATE 10 MG: 10 TABLET ORAL at 11:07

## 2019-01-05 RX ADMIN — LOSARTAN POTASSIUM 50 MG: 50 TABLET ORAL at 22:25

## 2019-01-05 RX ADMIN — CLOPIDOGREL BISULFATE 75 MG: 75 TABLET ORAL at 11:07

## 2019-01-05 RX ADMIN — Medication 10 ML: at 22:26

## 2019-01-05 RX ADMIN — CIPROFLOXACIN HYDROCHLORIDE 1 DROP: 3 SOLUTION/ DROPS OPHTHALMIC at 11:09

## 2019-01-05 RX ADMIN — CIPROFLOXACIN HYDROCHLORIDE 1 DROP: 3 SOLUTION/ DROPS OPHTHALMIC at 18:04

## 2019-01-05 RX ADMIN — SIMVASTATIN 20 MG: 20 TABLET, FILM COATED ORAL at 22:25

## 2019-01-05 RX ADMIN — CIPROFLOXACIN HYDROCHLORIDE 1 DROP: 3 SOLUTION/ DROPS OPHTHALMIC at 05:43

## 2019-01-05 RX ADMIN — METOPROLOL SUCCINATE 25 MG: 25 TABLET, EXTENDED RELEASE ORAL at 11:08

## 2019-01-05 RX ADMIN — LEVOTHYROXINE SODIUM 25 MCG: 0.03 TABLET ORAL at 05:43

## 2019-01-05 ASSESSMENT — PAIN SCALES - GENERAL: PAINLEVEL_OUTOF10: 0

## 2019-01-06 VITALS
RESPIRATION RATE: 18 BRPM | WEIGHT: 96.12 LBS | OXYGEN SATURATION: 97 % | SYSTOLIC BLOOD PRESSURE: 157 MMHG | DIASTOLIC BLOOD PRESSURE: 63 MMHG | HEIGHT: 60 IN | BODY MASS INDEX: 18.87 KG/M2 | TEMPERATURE: 99 F | HEART RATE: 92 BPM

## 2019-01-06 PROCEDURE — 6360000002 HC RX W HCPCS: Performed by: NURSE PRACTITIONER

## 2019-01-06 PROCEDURE — 6370000000 HC RX 637 (ALT 250 FOR IP): Performed by: INTERNAL MEDICINE

## 2019-01-06 PROCEDURE — 2580000003 HC RX 258: Performed by: NURSE PRACTITIONER

## 2019-01-06 RX ADMIN — METOPROLOL SUCCINATE 25 MG: 25 TABLET, EXTENDED RELEASE ORAL at 08:53

## 2019-01-06 RX ADMIN — Medication 10 ML: at 08:52

## 2019-01-06 RX ADMIN — AMLODIPINE BESYLATE 10 MG: 10 TABLET ORAL at 08:53

## 2019-01-06 RX ADMIN — CIPROFLOXACIN HYDROCHLORIDE 1 DROP: 3 SOLUTION/ DROPS OPHTHALMIC at 00:53

## 2019-01-06 RX ADMIN — ENOXAPARIN SODIUM 40 MG: 40 INJECTION SUBCUTANEOUS at 08:52

## 2019-01-06 RX ADMIN — ASPIRIN 81 MG 81 MG: 81 TABLET ORAL at 08:52

## 2019-01-06 RX ADMIN — MICONAZOLE NITRATE: 20 POWDER TOPICAL at 08:54

## 2019-01-06 RX ADMIN — FLUCONAZOLE 100 MG: 100 TABLET ORAL at 08:53

## 2019-01-06 RX ADMIN — CLOPIDOGREL BISULFATE 75 MG: 75 TABLET ORAL at 08:52

## 2019-01-06 RX ADMIN — LOSARTAN POTASSIUM 50 MG: 50 TABLET ORAL at 08:53

## 2019-01-07 ENCOUNTER — OFFICE VISIT (OUTPATIENT)
Dept: GERIATRIC MEDICINE | Age: 84
End: 2019-01-07
Payer: MEDICARE

## 2019-01-07 DIAGNOSIS — R62.7 ADULT FAILURE TO THRIVE: Primary | ICD-10-CM

## 2019-01-07 DIAGNOSIS — G93.40 ENCEPHALOPATHY: ICD-10-CM

## 2019-01-07 DIAGNOSIS — N30.90 CYSTITIS: ICD-10-CM

## 2019-01-07 DIAGNOSIS — R26.81 UNSTEADINESS: ICD-10-CM

## 2019-01-07 DIAGNOSIS — I63.9 CEREBROVASCULAR ACCIDENT (CVA), UNSPECIFIED MECHANISM (HCC): ICD-10-CM

## 2019-01-07 LAB
BLOOD CULTURE, ROUTINE: NORMAL
CULTURE, BLOOD 2: NORMAL

## 2019-01-07 PROCEDURE — 99305 1ST NF CARE MODERATE MDM 35: CPT | Performed by: INTERNAL MEDICINE

## 2019-01-07 PROCEDURE — G8484 FLU IMMUNIZE NO ADMIN: HCPCS | Performed by: INTERNAL MEDICINE

## 2019-01-07 PROCEDURE — 1123F ACP DISCUSS/DSCN MKR DOCD: CPT | Performed by: INTERNAL MEDICINE

## 2019-01-31 ENCOUNTER — OFFICE VISIT (OUTPATIENT)
Dept: GERIATRIC MEDICINE | Age: 84
End: 2019-01-31
Payer: MEDICARE

## 2019-01-31 DIAGNOSIS — F01.50 VASCULAR DEMENTIA WITHOUT BEHAVIORAL DISTURBANCE (HCC): Primary | ICD-10-CM

## 2019-01-31 DIAGNOSIS — I69.90 LATE EFFECTS OF CVA (CEREBROVASCULAR ACCIDENT): ICD-10-CM

## 2019-01-31 DIAGNOSIS — E43 SEVERE PROTEIN-CALORIE MALNUTRITION (HCC): ICD-10-CM

## 2019-01-31 DIAGNOSIS — I10 ESSENTIAL HYPERTENSION: ICD-10-CM

## 2019-01-31 PROCEDURE — G8484 FLU IMMUNIZE NO ADMIN: HCPCS | Performed by: NURSE PRACTITIONER

## 2019-01-31 PROCEDURE — 99309 SBSQ NF CARE MODERATE MDM 30: CPT | Performed by: NURSE PRACTITIONER

## 2019-01-31 PROCEDURE — 1101F PT FALLS ASSESS-DOCD LE1/YR: CPT | Performed by: NURSE PRACTITIONER

## 2019-01-31 PROCEDURE — 1123F ACP DISCUSS/DSCN MKR DOCD: CPT | Performed by: NURSE PRACTITIONER

## 2019-02-04 LAB
ANION GAP SERPL CALCULATED.3IONS-SCNC: 14 MEQ/L (ref 7–13)
BUN BLDV-MCNC: 24 MG/DL (ref 8–23)
CALCIUM SERPL-MCNC: 8.8 MG/DL (ref 8.6–10.2)
CHLORIDE BLD-SCNC: 100 MEQ/L (ref 98–107)
CO2: 24 MEQ/L (ref 22–29)
CREAT SERPL-MCNC: 0.44 MG/DL (ref 0.5–0.9)
GFR AFRICAN AMERICAN: >60
GFR NON-AFRICAN AMERICAN: >60
GLUCOSE BLD-MCNC: 80 MG/DL (ref 74–109)
HCT VFR BLD CALC: 25.3 % (ref 37–47)
HEMOGLOBIN: 8.2 G/DL (ref 12–16)
MCH RBC QN AUTO: 27.7 PG (ref 27–31.3)
MCHC RBC AUTO-ENTMCNC: 32.6 % (ref 33–37)
MCV RBC AUTO: 85.1 FL (ref 82–100)
PDW BLD-RTO: 15.5 % (ref 11.5–14.5)
PLATELET # BLD: 579 K/UL (ref 130–400)
POTASSIUM SERPL-SCNC: 4 MEQ/L (ref 3.5–5.1)
RBC # BLD: 2.98 M/UL (ref 4.2–5.4)
SODIUM BLD-SCNC: 138 MEQ/L (ref 132–144)
WBC # BLD: 6.3 K/UL (ref 4.8–10.8)

## 2019-02-21 ENCOUNTER — OFFICE VISIT (OUTPATIENT)
Dept: GERIATRIC MEDICINE | Age: 84
End: 2019-02-21
Payer: MEDICARE

## 2019-02-21 VITALS
BODY MASS INDEX: 19.61 KG/M2 | TEMPERATURE: 98 F | DIASTOLIC BLOOD PRESSURE: 69 MMHG | WEIGHT: 100.4 LBS | HEART RATE: 72 BPM | RESPIRATION RATE: 18 BRPM | SYSTOLIC BLOOD PRESSURE: 122 MMHG | OXYGEN SATURATION: 98 %

## 2019-02-21 DIAGNOSIS — R54 FRAIL ELDERLY: ICD-10-CM

## 2019-02-21 DIAGNOSIS — M81.0 AGE RELATED OSTEOPOROSIS, UNSPECIFIED PATHOLOGICAL FRACTURE PRESENCE: ICD-10-CM

## 2019-02-21 DIAGNOSIS — M25.552 LEFT HIP PAIN: Primary | ICD-10-CM

## 2019-02-21 PROCEDURE — 1123F ACP DISCUSS/DSCN MKR DOCD: CPT | Performed by: NURSE PRACTITIONER

## 2019-02-21 PROCEDURE — G8484 FLU IMMUNIZE NO ADMIN: HCPCS | Performed by: NURSE PRACTITIONER

## 2019-02-21 PROCEDURE — 1101F PT FALLS ASSESS-DOCD LE1/YR: CPT | Performed by: NURSE PRACTITIONER

## 2019-02-21 PROCEDURE — 99309 SBSQ NF CARE MODERATE MDM 30: CPT | Performed by: NURSE PRACTITIONER

## 2019-02-24 PROBLEM — R53.1 WEAKNESS: Status: RESOLVED | Noted: 2019-01-02 | Resolved: 2019-02-24

## 2019-02-28 ENCOUNTER — OFFICE VISIT (OUTPATIENT)
Dept: GERIATRIC MEDICINE | Age: 84
End: 2019-02-28
Payer: MEDICARE

## 2019-02-28 DIAGNOSIS — I10 ESSENTIAL HYPERTENSION: Primary | ICD-10-CM

## 2019-02-28 DIAGNOSIS — R62.7 ADULT FAILURE TO THRIVE: ICD-10-CM

## 2019-02-28 DIAGNOSIS — F03.90 DEMENTIA WITHOUT BEHAVIORAL DISTURBANCE, UNSPECIFIED DEMENTIA TYPE: ICD-10-CM

## 2019-02-28 PROCEDURE — 1101F PT FALLS ASSESS-DOCD LE1/YR: CPT | Performed by: INTERNAL MEDICINE

## 2019-02-28 PROCEDURE — 1123F ACP DISCUSS/DSCN MKR DOCD: CPT | Performed by: INTERNAL MEDICINE

## 2019-02-28 PROCEDURE — G8484 FLU IMMUNIZE NO ADMIN: HCPCS | Performed by: INTERNAL MEDICINE

## 2019-02-28 PROCEDURE — 99309 SBSQ NF CARE MODERATE MDM 30: CPT | Performed by: INTERNAL MEDICINE

## 2019-03-01 VITALS
OXYGEN SATURATION: 98 % | SYSTOLIC BLOOD PRESSURE: 110 MMHG | RESPIRATION RATE: 17 BRPM | DIASTOLIC BLOOD PRESSURE: 60 MMHG | WEIGHT: 96.2 LBS | BODY MASS INDEX: 18.79 KG/M2 | HEART RATE: 87 BPM | TEMPERATURE: 98.1 F

## 2019-03-14 LAB
ALBUMIN SERPL-MCNC: 3.2 G/DL (ref 3.5–4.6)
ALP BLD-CCNC: 90 U/L (ref 40–130)
ALT SERPL-CCNC: 11 U/L (ref 0–33)
ANION GAP SERPL CALCULATED.3IONS-SCNC: 12 MEQ/L (ref 9–15)
AST SERPL-CCNC: 16 U/L (ref 0–35)
BASOPHILS ABSOLUTE: 0 K/UL (ref 0–0.2)
BASOPHILS RELATIVE PERCENT: 0.8 %
BILIRUB SERPL-MCNC: <0.2 MG/DL (ref 0.2–0.7)
BUN BLDV-MCNC: 15 MG/DL (ref 8–23)
CALCIUM SERPL-MCNC: 9 MG/DL (ref 8.5–9.9)
CHLORIDE BLD-SCNC: 96 MEQ/L (ref 95–107)
CO2: 25 MEQ/L (ref 20–31)
CREAT SERPL-MCNC: 0.45 MG/DL (ref 0.5–0.9)
EOSINOPHILS ABSOLUTE: 0.1 K/UL (ref 0–0.7)
EOSINOPHILS RELATIVE PERCENT: 2.1 %
GFR AFRICAN AMERICAN: >60
GFR NON-AFRICAN AMERICAN: >60
GLOBULIN: 4 G/DL (ref 2.3–3.5)
GLUCOSE BLD-MCNC: 78 MG/DL (ref 70–99)
HCT VFR BLD CALC: 26.4 % (ref 37–47)
HEMOGLOBIN: 8.3 G/DL (ref 12–16)
LYMPHOCYTES ABSOLUTE: 1 K/UL (ref 1–4.8)
LYMPHOCYTES RELATIVE PERCENT: 18 %
MCH RBC QN AUTO: 26.4 PG (ref 27–31.3)
MCHC RBC AUTO-ENTMCNC: 31.5 % (ref 33–37)
MCV RBC AUTO: 83.8 FL (ref 82–100)
MONOCYTES ABSOLUTE: 0.6 K/UL (ref 0.2–0.8)
MONOCYTES RELATIVE PERCENT: 11.1 %
NEUTROPHILS ABSOLUTE: 3.8 K/UL (ref 1.4–6.5)
NEUTROPHILS RELATIVE PERCENT: 68 %
PDW BLD-RTO: 16.2 % (ref 11.5–14.5)
PLATELET # BLD: 544 K/UL (ref 130–400)
POTASSIUM SERPL-SCNC: 4.4 MEQ/L (ref 3.4–4.9)
RBC # BLD: 3.15 M/UL (ref 4.2–5.4)
SODIUM BLD-SCNC: 133 MEQ/L (ref 135–144)
TOTAL PROTEIN: 7.2 G/DL (ref 6.3–8)
TSH SERPL DL<=0.05 MIU/L-ACNC: 6.31 UIU/ML (ref 0.44–3.86)
VALPROIC ACID LEVEL: <2.8 UG/ML (ref 50–100)
WBC # BLD: 5.7 K/UL (ref 4.8–10.8)

## 2019-03-25 LAB — VALPROIC ACID LEVEL: 5.6 UG/ML (ref 50–100)

## 2019-03-28 ENCOUNTER — OFFICE VISIT (OUTPATIENT)
Dept: GERIATRIC MEDICINE | Age: 84
End: 2019-03-28
Payer: MEDICARE

## 2019-03-28 DIAGNOSIS — M16.10 PRIMARY OSTEOARTHRITIS OF HIP, UNSPECIFIED LATERALITY: ICD-10-CM

## 2019-03-28 DIAGNOSIS — I25.10 CORONARY ARTERY DISEASE INVOLVING NATIVE CORONARY ARTERY OF NATIVE HEART WITHOUT ANGINA PECTORIS: ICD-10-CM

## 2019-03-28 DIAGNOSIS — E43 SEVERE PROTEIN-CALORIE MALNUTRITION (HCC): ICD-10-CM

## 2019-03-28 DIAGNOSIS — F01.50 VASCULAR DEMENTIA WITHOUT BEHAVIORAL DISTURBANCE (HCC): Primary | ICD-10-CM

## 2019-03-28 PROCEDURE — G8484 FLU IMMUNIZE NO ADMIN: HCPCS | Performed by: NURSE PRACTITIONER

## 2019-03-28 PROCEDURE — 99308 SBSQ NF CARE LOW MDM 20: CPT | Performed by: NURSE PRACTITIONER

## 2019-03-28 PROCEDURE — 1123F ACP DISCUSS/DSCN MKR DOCD: CPT | Performed by: NURSE PRACTITIONER

## 2019-04-08 VITALS
RESPIRATION RATE: 18 BRPM | HEART RATE: 65 BPM | TEMPERATURE: 99.4 F | SYSTOLIC BLOOD PRESSURE: 91 MMHG | BODY MASS INDEX: 19.14 KG/M2 | DIASTOLIC BLOOD PRESSURE: 46 MMHG | OXYGEN SATURATION: 96 % | WEIGHT: 98 LBS

## 2019-04-15 LAB
ANION GAP SERPL CALCULATED.3IONS-SCNC: 10 MEQ/L (ref 9–15)
BUN BLDV-MCNC: 19 MG/DL (ref 8–23)
CALCIUM SERPL-MCNC: 9.1 MG/DL (ref 8.5–9.9)
CHLORIDE BLD-SCNC: 98 MEQ/L (ref 95–107)
CO2: 28 MEQ/L (ref 20–31)
CREAT SERPL-MCNC: 0.41 MG/DL (ref 0.5–0.9)
GFR AFRICAN AMERICAN: >60
GFR NON-AFRICAN AMERICAN: >60
GLUCOSE BLD-MCNC: 79 MG/DL (ref 70–99)
POTASSIUM SERPL-SCNC: 4 MEQ/L (ref 3.4–4.9)
SODIUM BLD-SCNC: 136 MEQ/L (ref 135–144)

## 2019-04-23 ENCOUNTER — OFFICE VISIT (OUTPATIENT)
Dept: GERIATRIC MEDICINE | Age: 84
End: 2019-04-23
Payer: MEDICARE

## 2019-04-23 DIAGNOSIS — F01.50 VASCULAR DEMENTIA WITHOUT BEHAVIORAL DISTURBANCE (HCC): Primary | ICD-10-CM

## 2019-04-23 DIAGNOSIS — M15.9 OSTEOARTHRITIS OF MULTIPLE JOINTS, UNSPECIFIED OSTEOARTHRITIS TYPE: ICD-10-CM

## 2019-04-23 DIAGNOSIS — R23.8 SKIN BREAKDOWN: ICD-10-CM

## 2019-04-23 PROCEDURE — 99309 SBSQ NF CARE MODERATE MDM 30: CPT | Performed by: INTERNAL MEDICINE

## 2019-04-23 PROCEDURE — 1123F ACP DISCUSS/DSCN MKR DOCD: CPT | Performed by: INTERNAL MEDICINE

## 2019-04-26 VITALS — HEART RATE: 86 BPM | DIASTOLIC BLOOD PRESSURE: 66 MMHG | TEMPERATURE: 98.1 F | SYSTOLIC BLOOD PRESSURE: 134 MMHG

## 2019-04-26 NOTE — PROGRESS NOTES
Wendi Shah : 1933 DOS: 2019     [facility]    Seen for an admission history and physical.     CHIEF COMPLAINT:  Encephalopathy, weakness. HISTORY OF PRESENT ILLNESS:  This is an 49-year-old woman who has been community dwelling individual, following with Dr. Anuj Shaw in the community. The patient's primary care giver is her daughter, brought her to ER with increasing weakness, confusion. The patient has had prior CVA with right-sided hemiparesis, dementia presumed vascular type, visual impairment. The patient has been having increasing urinary symptoms. The patient also has change in mental status beyond her baseline. She was hospitalized, evaluated for possibility of acute urinary tract infection. The patient did have evidence of renal air, apparently has been started on course of antibiotic therapy, was given a course of IV fluids. The patient was stabilized. She was seen by Dr. Nuvia Moreno from rehabilitation services while hospitalized, was felt that she would benefit from course of rehabilitation at this center prior to returning to community if possible. She is becoming increasingly debilitated and per Dr. Nuvia Moreno questionably largely bedbound. The patient again while hospitalized was seen by ophthalmology. The patient was started on course of Cipro eyedrops. She was stabilized and transferred here for ongoing course of care. MEDICATIONS:  On arrival included Norvasc 10 mg daily, aspirin 81 mg daily, Cipro ophthalmic, Plavix 75 mg daily, fluconazole 100 mg daily, hydrochlorothiazide 12.5 mg daily, Synthroid 25 mcg daily, losartan 50 mg twice daily, metoprolol succinate 25 mg twice daily, simvastatin 20 mg daily. FAMILY HISTORY:  Negative for early onset neoplasm and cardiac event. SOCIAL HISTORY:  The patient currently is nonsmoker, nondrinker. Has been declining in ability to maintain her IADLs.      PAST MEDICAL HISTORY:  Included protein malnutrition, status post CVA, vascular dementia, lupus, hypokalemia, unsteadiness, weakness, generalized debility, hypertension, adult failure to thrive. REVIEW OF SYSTEMS:  The patient is somewhat confused at her baseline, but denies chest pain, palpitations, nausea, vomiting, emesis, fevers or chills. Denies change in her bowel or bladder habits. No new bleeding diathesis. Rest of her 14-point review of systems was unremarkable. PHYSICAL EXAMINATION:  The patient's vital signs were stable. She was afebrile 98.1, pulse 86, blood pressure 134/66. She is normocephalic, atraumatic. Pupils are small, but they are reactive. Extraocular movements are intact. Oral mucosa is moist.  Chest showed diminished breath sounds. No crackles, no wheezing. Cardiovascular exam showed a regular rate with few ectopic beats. Abdomen is soft, nontender. Extremities showed trace dorsal pedal pulse. ASSESSMENT AND PLAN:  1. Adult failure to thrive: The patient has ongoing slow decline. If she does not show significant improvement, may consider referral for palliative care. 2.   Unsteadiness: The patient will benefit from a course of physical therapy and occupational therapy with a focus on balance training. 3.   Status post CVA:  The patient has had increasing weakness since protracted period of debility. Continue with aggressive therapy as able. Continue with nutritional support. 4.   Status post UTI:  The patient is completing course of antibiotic therapy. Clinically stable at this time. 5.   Encephalopathy:  In the presence of dementia, the patient may have worsening dementia versus intermittent delirium at this time. We will monitor clinically. Once stable, we will review with formal geropsych testing. Please note, available hospital records, imaging reports, consultant notes, laboratory results reviewed.         Electronically Signed By: Tanisha Barillas M.D. on 04/26/2019 11:11:15  ______________________________  HOMER Mccormick  D: 04/24/2019 11:53:50  T: 04/25/2019 06:32:41    cc: Yonas Gomez

## 2019-05-01 VITALS — SYSTOLIC BLOOD PRESSURE: 122 MMHG | HEART RATE: 64 BPM | TEMPERATURE: 97.5 F | DIASTOLIC BLOOD PRESSURE: 81 MMHG

## 2019-05-01 NOTE — PROGRESS NOTES
Reinier Jiménez : 1933 DOS: 2019     [facility]    Seen for routine monthly visit for her dementia, degenerative joint disease, skin breakdown, hip pain, protein malnutrition. SUBJECTIVE:  This is an extremely frail appearing 80-year-old woman who was evaluated in her room. The patient is at her baseline. Clinically, recently seen by wound care services, doing well from their standpoint. The patient is without any new acute pain crisis, is poorly redirectable by staff. OBJECTIVE:  She is frail appearing. She is afebrile at 97.5, pulse is 64, blood pressure 122/81. Pupils are small, minimally reactive. Oral mucosa is moist.  Chest sounds diminished breath sounds. Cardiovascular exam showed a regular rate. Abdomen is soft, nontender. Extremities showed trace dorsal pedal pulse. ASSESSMENT AND PLAN:  1. Vascular dementia. The patient is clinically stable. No evidence of acute psychosis. 2.   Skin breakdown. Continue with local wound care. 3.   Degenerative joint disease. No pain crisis. We will monitor.          Electronically Signed By: Javid Stark M.D. on 2019 11:09:08  ______________________________  Javid Stark M.D.  [id number]  D: 2019 23:21:40  T: 2019 06:16:31    cc: Yonas Gomez detailed exam details…

## 2019-05-22 DIAGNOSIS — R52 PAIN: Primary | ICD-10-CM

## 2019-05-22 RX ORDER — HYDROCODONE BITARTRATE AND ACETAMINOPHEN 5; 325 MG/1; MG/1
1 TABLET ORAL EVERY 12 HOURS PRN
COMMUNITY
End: 2019-05-22 | Stop reason: SDUPTHER

## 2019-05-22 RX ORDER — HYDROCODONE BITARTRATE AND ACETAMINOPHEN 5; 325 MG/1; MG/1
1 TABLET ORAL EVERY 12 HOURS PRN
Qty: 14 TABLET | Refills: 0 | Status: SHIPPED | OUTPATIENT
Start: 2019-05-22 | End: 2019-05-29

## 2019-05-28 ENCOUNTER — OFFICE VISIT (OUTPATIENT)
Dept: GERIATRIC MEDICINE | Age: 84
End: 2019-05-28
Payer: COMMERCIAL

## 2019-05-28 DIAGNOSIS — F01.50 VASCULAR DEMENTIA WITHOUT BEHAVIORAL DISTURBANCE (HCC): Primary | ICD-10-CM

## 2019-05-28 DIAGNOSIS — R62.7 FTT (FAILURE TO THRIVE) IN ADULT: ICD-10-CM

## 2019-05-28 DIAGNOSIS — M19.90 ARTHRITIS: ICD-10-CM

## 2019-05-28 DIAGNOSIS — M25.552 CHRONIC LEFT HIP PAIN: ICD-10-CM

## 2019-05-28 DIAGNOSIS — G89.29 CHRONIC LEFT HIP PAIN: ICD-10-CM

## 2019-05-28 PROCEDURE — 1123F ACP DISCUSS/DSCN MKR DOCD: CPT | Performed by: NURSE PRACTITIONER

## 2019-05-28 PROCEDURE — 99308 SBSQ NF CARE LOW MDM 20: CPT | Performed by: NURSE PRACTITIONER

## 2019-05-31 VITALS
OXYGEN SATURATION: 97 % | RESPIRATION RATE: 18 BRPM | DIASTOLIC BLOOD PRESSURE: 61 MMHG | SYSTOLIC BLOOD PRESSURE: 142 MMHG | HEART RATE: 69 BPM | TEMPERATURE: 97.8 F

## 2019-05-31 NOTE — PROGRESS NOTES
PATIENT: Odin Saldaña : 1933 DOS: 2019     [facility]    The patient is being seen for followup of her chronic illnesses. She has DJD, vascular dementia, left hip pain that is chronic, failure to thrive and frailty syndrome. She is a DNR-CC. She is still having some guarding and pain in the left hip. We had ordered patches and p.r.n. medications. She is not ambulatory, but it does hurt her when she passively has it moved when she is having Rosie-Care or personal care, bathing, etc. She does not particularly like to lay on the left side either. When she sits in the chair she is slightly tilting, listing to 1 side. She is still eating and drinking well. She is very thin though and frail, but still eats most of everything on her tray. She has no bad behaviors. She sleeps at night. She is still verbal and can answer questions. She sits up much of the day. She does not participate in group activities, but she will watch TV or she will hold the baby doll 1 on 1. MEDICATIONS AND ALLERGIES: Reviewed in the nursing facility chart. Past Medical History:   Diagnosis Date    Abnormality of gait and mobility due to Right hemiparesis secondary to Left Cerebral Infarcts. [facility] admit 18. 2018    This is an 80year old female who was brought to there ER with altered mental status. She was confused the morning of admission; she woke up and was sitting on the edge of the bed and fell to the side. In the ER she was noted to be hypertensive to 847;C systolic and bradycardic to 30's. She was noted to have a complete heart block with non ST elevation infarction.   Taken to the cath lab and had    Anemia 2018    Complete heart block ([address] 2018    Dysphagia, oropharyngeal phase 2018    History of PTCA with stents 2018    [place] De[place] (hard of hearing) 2018    HTN (hypertension) 2018    Hypothyroidism 2018    Incontinent of urine 2018    MI

## 2019-06-28 ENCOUNTER — OFFICE VISIT (OUTPATIENT)
Dept: GERIATRIC MEDICINE | Age: 84
End: 2019-06-28
Payer: COMMERCIAL

## 2019-06-28 DIAGNOSIS — M15.9 OSTEOARTHRITIS OF MULTIPLE JOINTS, UNSPECIFIED OSTEOARTHRITIS TYPE: ICD-10-CM

## 2019-06-28 DIAGNOSIS — I10 HYPERTENSION, UNSPECIFIED TYPE: ICD-10-CM

## 2019-06-28 DIAGNOSIS — F03.90 DEMENTIA WITHOUT BEHAVIORAL DISTURBANCE, UNSPECIFIED DEMENTIA TYPE: Primary | ICD-10-CM

## 2019-06-28 PROCEDURE — 99308 SBSQ NF CARE LOW MDM 20: CPT | Performed by: INTERNAL MEDICINE

## 2019-06-28 PROCEDURE — 1123F ACP DISCUSS/DSCN MKR DOCD: CPT | Performed by: INTERNAL MEDICINE

## 2019-07-11 NOTE — PROGRESS NOTES
PATIENT: Brigid Weiss : 1933 DOS: 2019     [facility]    Seen for routine monthly visit for her dementia, status post CVA, hypertension, protein malnutrition. MEDICATIONS:  Reviewed. SUBJECTIVE:  This 77-year-old woman remains at baseline, globally frail. The patient has been evaluated in the hospital in the past.  She has not had any new emesis, fevers, or chills. The patient has not had any new change in her bowel or bladder habits. Remains at her baseline, weak. She has not had any new chest pain or palpitations. OBJECTIVE:  She appeared chronically ill. Pupils are small, minimally reactive. Oral mucosa is moist.  Chest showed no crackles, no wheezing. Cardiovascular exam showed a regular rate. Abdomen is soft, nontender. Extremities showed trace dorsal pedal pulse. ASSESSMENT AND PLAN:  1. Dementia:  The patient has ongoing slow cognitive decline. 2.   Hypertension:  Blood pressure is stable. 3.   Degenerative joint disease:  No new pain crisis. We will monitor.          Electronically Signed By: Janelle Price M.D. on 2019 11:54:12  ______________________________  Janelle Price M.D.  [id number]  D: 2019 22:38:44  T: 2019 19:44:54    cc: - Matt Dash

## 2019-07-17 DIAGNOSIS — R52 PAIN: Primary | ICD-10-CM

## 2019-07-17 RX ORDER — HYDROCODONE BITARTRATE AND ACETAMINOPHEN 5; 325 MG/1; MG/1
1 TABLET ORAL EVERY 12 HOURS PRN
COMMUNITY
End: 2019-07-18 | Stop reason: SDUPTHER

## 2019-07-18 RX ORDER — HYDROCODONE BITARTRATE AND ACETAMINOPHEN 5; 325 MG/1; MG/1
1 TABLET ORAL EVERY 12 HOURS PRN
Qty: 28 TABLET | Refills: 0 | Status: SHIPPED | OUTPATIENT
Start: 2019-07-18 | End: 2019-08-01

## 2019-07-25 ENCOUNTER — OFFICE VISIT (OUTPATIENT)
Dept: GERIATRIC MEDICINE | Age: 84
End: 2019-07-25
Payer: COMMERCIAL

## 2019-07-25 DIAGNOSIS — E43 SEVERE PROTEIN-CALORIE MALNUTRITION (HCC): ICD-10-CM

## 2019-07-25 DIAGNOSIS — I44.2 COMPLETE HEART BLOCK (HCC): ICD-10-CM

## 2019-07-25 DIAGNOSIS — F01.50 VASCULAR DEMENTIA WITHOUT BEHAVIORAL DISTURBANCE (HCC): ICD-10-CM

## 2019-07-25 DIAGNOSIS — G81.91 RIGHT HEMIPARESIS (HCC): Primary | ICD-10-CM

## 2019-07-25 PROCEDURE — 99308 SBSQ NF CARE LOW MDM 20: CPT | Performed by: NURSE PRACTITIONER

## 2019-07-25 PROCEDURE — 1123F ACP DISCUSS/DSCN MKR DOCD: CPT | Performed by: NURSE PRACTITIONER

## 2019-07-26 LAB
ANION GAP SERPL CALCULATED.3IONS-SCNC: 14 MEQ/L (ref 9–15)
BUN BLDV-MCNC: 19 MG/DL (ref 8–23)
CALCIUM SERPL-MCNC: 9.7 MG/DL (ref 8.5–9.9)
CHLORIDE BLD-SCNC: 98 MEQ/L (ref 95–107)
CO2: 26 MEQ/L (ref 20–31)
CREAT SERPL-MCNC: 0.39 MG/DL (ref 0.5–0.9)
GFR AFRICAN AMERICAN: >60
GFR NON-AFRICAN AMERICAN: >60
GLUCOSE BLD-MCNC: 76 MG/DL (ref 70–99)
HCT VFR BLD CALC: 31.3 % (ref 37–47)
HEMOGLOBIN: 10.2 G/DL (ref 12–16)
MCH RBC QN AUTO: 25.9 PG (ref 27–31.3)
MCHC RBC AUTO-ENTMCNC: 32.5 % (ref 33–37)
MCV RBC AUTO: 79.8 FL (ref 82–100)
PDW BLD-RTO: 17.3 % (ref 11.5–14.5)
PLATELET # BLD: 439 K/UL (ref 130–400)
POTASSIUM SERPL-SCNC: 4.1 MEQ/L (ref 3.4–4.9)
RBC # BLD: 3.93 M/UL (ref 4.2–5.4)
SODIUM BLD-SCNC: 138 MEQ/L (ref 135–144)
WBC # BLD: 5.9 K/UL (ref 4.8–10.8)

## 2019-08-05 VITALS
BODY MASS INDEX: 16.74 KG/M2 | OXYGEN SATURATION: 97 % | WEIGHT: 85.7 LBS | HEART RATE: 65 BPM | DIASTOLIC BLOOD PRESSURE: 60 MMHG | TEMPERATURE: 97.6 F | SYSTOLIC BLOOD PRESSURE: 97 MMHG | RESPIRATION RATE: 16 BRPM

## 2019-08-23 ENCOUNTER — OFFICE VISIT (OUTPATIENT)
Dept: GERIATRIC MEDICINE | Age: 84
End: 2019-08-23
Payer: COMMERCIAL

## 2019-08-23 DIAGNOSIS — F41.9 ANXIETY: ICD-10-CM

## 2019-08-23 DIAGNOSIS — R54 FRAILTY SYNDROME IN GERIATRIC PATIENT: Primary | ICD-10-CM

## 2019-08-23 DIAGNOSIS — R63.4 WEIGHT LOSS: ICD-10-CM

## 2019-08-23 DIAGNOSIS — F01.50 VASCULAR DEMENTIA WITHOUT BEHAVIORAL DISTURBANCE (HCC): ICD-10-CM

## 2019-08-23 PROCEDURE — 99309 SBSQ NF CARE MODERATE MDM 30: CPT | Performed by: NURSE PRACTITIONER

## 2019-08-23 PROCEDURE — 1123F ACP DISCUSS/DSCN MKR DOCD: CPT | Performed by: NURSE PRACTITIONER

## 2019-08-28 LAB — VALPROIC ACID LEVEL: 41.6 UG/ML (ref 50–100)

## 2019-08-29 ENCOUNTER — OFFICE VISIT (OUTPATIENT)
Dept: GERIATRIC MEDICINE | Age: 84
End: 2019-08-29
Payer: COMMERCIAL

## 2019-08-29 DIAGNOSIS — H10.029 OTHER MUCOPURULENT CONJUNCTIVITIS, UNSPECIFIED LATERALITY: Primary | ICD-10-CM

## 2019-08-29 PROCEDURE — 1123F ACP DISCUSS/DSCN MKR DOCD: CPT | Performed by: NURSE PRACTITIONER

## 2019-08-29 PROCEDURE — 99309 SBSQ NF CARE MODERATE MDM 30: CPT | Performed by: NURSE PRACTITIONER

## 2019-09-12 DIAGNOSIS — R52 PAIN: Primary | ICD-10-CM

## 2019-09-12 RX ORDER — HYDROCODONE BITARTRATE AND ACETAMINOPHEN 5; 325 MG/1; MG/1
1 TABLET ORAL 2 TIMES DAILY PRN
COMMUNITY
End: 2019-09-12 | Stop reason: SDUPTHER

## 2019-09-12 RX ORDER — HYDROCODONE BITARTRATE AND ACETAMINOPHEN 5; 325 MG/1; MG/1
1 TABLET ORAL 2 TIMES DAILY PRN
Qty: 28 TABLET | Refills: 0 | Status: SHIPPED | OUTPATIENT
Start: 2019-09-12 | End: 2019-09-26

## 2019-09-20 ENCOUNTER — OFFICE VISIT (OUTPATIENT)
Dept: GERIATRIC MEDICINE | Age: 84
End: 2019-09-20
Payer: COMMERCIAL

## 2019-09-20 DIAGNOSIS — E43 SEVERE PROTEIN-CALORIE MALNUTRITION (HCC): Primary | ICD-10-CM

## 2019-09-20 DIAGNOSIS — R62.7 ADULT FAILURE TO THRIVE: ICD-10-CM

## 2019-09-20 DIAGNOSIS — F03.90 DEMENTIA WITHOUT BEHAVIORAL DISTURBANCE, UNSPECIFIED DEMENTIA TYPE: ICD-10-CM

## 2019-09-20 PROCEDURE — 99309 SBSQ NF CARE MODERATE MDM 30: CPT | Performed by: INTERNAL MEDICINE

## 2019-09-20 PROCEDURE — 1123F ACP DISCUSS/DSCN MKR DOCD: CPT | Performed by: INTERNAL MEDICINE

## 2019-09-24 VITALS
OXYGEN SATURATION: 97 % | WEIGHT: 83.6 LBS | HEART RATE: 70 BPM | TEMPERATURE: 97.5 F | RESPIRATION RATE: 18 BRPM | SYSTOLIC BLOOD PRESSURE: 116 MMHG | DIASTOLIC BLOOD PRESSURE: 71 MMHG | BODY MASS INDEX: 16.33 KG/M2

## 2019-09-24 NOTE — PROGRESS NOTES
Evonne Castro : 1933 DOS: 2019     [facility]    The patient is being seen for weight loss. She is down to 83.6 pounds, that is almost 14.5 pounds over the last six months. She is really thin, frail, not eating well. She will not need simvastatin at this time. She is really not eating enough cholesterol to warrant taking a cholesterol pill. She has some increased irritability per nursing. She did have BuSpar on board for her anxiety. She is at 5 mg t.i.d. She is also not drinking well, so at this time we will discontinue her hydrochlorothiazide or she will become too dehydrated. She has got frailty syndrome and skin tears fairly easily. They put sleeves on her legs to help reduce that. She sits up in a chair or she lies in bed. She is nonambulatory. She eats and drinks a very little. No nausea, vomiting. No chest pain, shortness of breath. No fevers. No choking on food or fluids per nursing report. MEDICATIONS AND ALLERGIES: Reviewed in the nursing facility chart. Past Medical History:   Diagnosis Date    Abnormality of gait and mobility due to Right hemiparesis secondary to Left Cerebral Infarcts. [facility] admit 18. 2018    This is an 80year old female who was brought to there ER with altered mental status. She was confused the morning of admission; she woke up and was sitting on the edge of the bed and fell to the side. In the ER she was noted to be hypertensive to 883;W systolic and bradycardic to 30's. She was noted to have a complete heart block with non ST elevation infarction.   Taken to the cath lab and had    Anemia 2018    Complete heart block ([address] 2018    Dysphagia, oropharyngeal phase 2018    History of PTCA with stents 2018    [employer] (hard of hearing) 2018    HTN (hypertension) 2018    Hypothyroidism 2018    Incontinent of urine 2018    MI (myocardial infarction) ([address] 2018    Pneumonia of

## 2019-09-25 NOTE — PROGRESS NOTES
Dimple Da Silva : 1933 DOS: 2019     [facility]    Seen for a routine monthly visit for dementia, hypertension, degenerative joint disease, skin breakdown. SUBJECTIVE: This 70-year-old woman was evaluated in her room. The patient has been clinically stable. She has had recent skin tear, which she has had ongoing wound care for. The patient has poor oral intake. No new lightheadedness. No recent falls. The patient has had no change in her bowel or bladder habits and globally frail. OBJECTIVE: She appeared at her baseline. Pupils are small and minimally reactive. Oral mucosa is dry. Chest showed diminished breath sounds. No crackles, no wheezing. Cardiovascular exam showed a regular rate. Abdomen is soft, nontender. Extremities showed trace dorsal pedal pulse. ASSESSMENT AND PLAN:   1. Severe protein malnutrition. The patient has profound weakness and obvious malnutrition. Ongoing slow decline. The patient has a predicted 6-month left expectancy. May consider for hospice services. 2.  Dementia. No evidence of acute psychosis. Continue with current neurovascular care. Again, the patient has globally poor outlook. 3.  Adult failure to thrive. We will review the patient's care with nursing staff. May consider more conservative care for skin breakdown. Receives local wound care. Given her overall protein malnutrition, this may be a protracted course of healing.           Electronically Signed By: Devon Pierre M.D. on 2019 00:23:34  ______________________________  HOMER Fonseca/[id number]  D: 2019 14:30:36  T: 2019 23:18:46    cc: - Mally Muhammad

## 2019-10-17 ENCOUNTER — OFFICE VISIT (OUTPATIENT)
Dept: GERIATRIC MEDICINE | Age: 84
End: 2019-10-17
Payer: COMMERCIAL

## 2019-10-17 DIAGNOSIS — E43 SEVERE PROTEIN-CALORIE MALNUTRITION (HCC): ICD-10-CM

## 2019-10-17 DIAGNOSIS — F03.90 DEMENTIA WITHOUT BEHAVIORAL DISTURBANCE, UNSPECIFIED DEMENTIA TYPE: ICD-10-CM

## 2019-10-17 DIAGNOSIS — R62.7 ADULT FAILURE TO THRIVE: Primary | ICD-10-CM

## 2019-10-17 PROCEDURE — 99309 SBSQ NF CARE MODERATE MDM 30: CPT | Performed by: INTERNAL MEDICINE

## 2019-10-17 PROCEDURE — 1123F ACP DISCUSS/DSCN MKR DOCD: CPT | Performed by: INTERNAL MEDICINE

## 2019-10-17 PROCEDURE — G8484 FLU IMMUNIZE NO ADMIN: HCPCS | Performed by: INTERNAL MEDICINE

## 2019-10-23 ENCOUNTER — CARE COORDINATION (OUTPATIENT)
Dept: CARE COORDINATION | Age: 84
End: 2019-10-23

## 2019-11-21 ENCOUNTER — OFFICE VISIT (OUTPATIENT)
Dept: GERIATRIC MEDICINE | Age: 84
End: 2019-11-21
Payer: COMMERCIAL

## 2019-11-21 DIAGNOSIS — E46 PROTEIN MALNUTRITION (HCC): ICD-10-CM

## 2019-11-21 DIAGNOSIS — I10 ESSENTIAL HYPERTENSION: ICD-10-CM

## 2019-11-21 DIAGNOSIS — F03.90 DEMENTIA WITHOUT BEHAVIORAL DISTURBANCE, UNSPECIFIED DEMENTIA TYPE: Primary | ICD-10-CM

## 2019-11-21 PROCEDURE — 1123F ACP DISCUSS/DSCN MKR DOCD: CPT | Performed by: INTERNAL MEDICINE

## 2019-11-21 PROCEDURE — G8484 FLU IMMUNIZE NO ADMIN: HCPCS | Performed by: INTERNAL MEDICINE

## 2019-11-21 PROCEDURE — 99309 SBSQ NF CARE MODERATE MDM 30: CPT | Performed by: INTERNAL MEDICINE

## 2019-12-16 ENCOUNTER — OFFICE VISIT (OUTPATIENT)
Dept: GERIATRIC MEDICINE | Age: 84
End: 2019-12-16
Payer: COMMERCIAL

## 2019-12-16 DIAGNOSIS — S40.022A CONTUSION OF LEFT ARM, INITIAL ENCOUNTER: Primary | ICD-10-CM

## 2019-12-16 PROCEDURE — G8484 FLU IMMUNIZE NO ADMIN: HCPCS | Performed by: NURSE PRACTITIONER

## 2019-12-16 PROCEDURE — 1123F ACP DISCUSS/DSCN MKR DOCD: CPT | Performed by: NURSE PRACTITIONER

## 2019-12-16 PROCEDURE — 99309 SBSQ NF CARE MODERATE MDM 30: CPT | Performed by: NURSE PRACTITIONER

## 2019-12-17 ENCOUNTER — OFFICE VISIT (OUTPATIENT)
Dept: GERIATRIC MEDICINE | Age: 84
End: 2019-12-17
Payer: COMMERCIAL

## 2019-12-17 DIAGNOSIS — E03.9 HYPOTHYROIDISM, UNSPECIFIED TYPE: ICD-10-CM

## 2019-12-17 DIAGNOSIS — S50.10XA TRAUMATIC HEMATOMA OF FOREARM, INITIAL ENCOUNTER: Primary | ICD-10-CM

## 2019-12-17 DIAGNOSIS — E43 SEVERE PROTEIN-CALORIE MALNUTRITION (HCC): ICD-10-CM

## 2019-12-17 DIAGNOSIS — I10 ESSENTIAL HYPERTENSION: ICD-10-CM

## 2019-12-17 DIAGNOSIS — F01.50 VASCULAR DEMENTIA WITHOUT BEHAVIORAL DISTURBANCE (HCC): ICD-10-CM

## 2019-12-17 PROCEDURE — 99309 SBSQ NF CARE MODERATE MDM 30: CPT | Performed by: NURSE PRACTITIONER

## 2019-12-20 ENCOUNTER — OFFICE VISIT (OUTPATIENT)
Dept: GERIATRIC MEDICINE | Age: 84
End: 2019-12-20
Payer: COMMERCIAL

## 2019-12-20 VITALS
TEMPERATURE: 97.7 F | OXYGEN SATURATION: 96 % | SYSTOLIC BLOOD PRESSURE: 131 MMHG | HEART RATE: 62 BPM | RESPIRATION RATE: 18 BRPM | DIASTOLIC BLOOD PRESSURE: 74 MMHG

## 2019-12-20 PROCEDURE — 99308 SBSQ NF CARE LOW MDM 20: CPT | Performed by: NURSE PRACTITIONER

## 2019-12-21 PROBLEM — S50.10XA: Status: ACTIVE | Noted: 2019-12-21

## 2019-12-23 LAB
TSH SERPL DL<=0.05 MIU/L-ACNC: 6.35 UIU/ML (ref 0.44–3.86)
VALPROIC ACID LEVEL: 24.8 UG/ML (ref 50–100)

## 2019-12-24 LAB — T4 TOTAL: 7 UG/DL (ref 4.5–11.7)

## 2020-01-15 PROBLEM — G89.4 PAIN SYNDROME, CHRONIC: Status: ACTIVE | Noted: 2020-01-15

## 2020-01-15 PROBLEM — M15.9 OSTEOARTHRITIS OF MULTIPLE JOINTS: Status: ACTIVE | Noted: 2020-01-15

## 2020-01-16 NOTE — PROGRESS NOTES
Name: Eber Farias: [address] 34   connie, Kym 50  Date: 12/20/2019     Subjective:     Chief Complaint   Patient presents with    Pain       HPI  Diogo Polanco is a 80 y.o. female being seen today for renewal of pain medication for chronic pain. Resident has osteoarthritis of multiple joints and states \"pain level is 5-6/10. She is currently on Norco 5-325 mg daphney 12 hours as needed for pain. Vital signs are stable. Will continue current dose of pain medications. No GDR at this time. Allergies:  Reviewed in nursing facility records  Medications:  Reviewed and reconciled in nursing facility records    Review of Systems Pertinent positives as noted in HPI. Objective:       Physical Exam  Constitutional:       Appearance: Normal appearance. She is not ill-appearing. Cardiovascular:      Rate and Rhythm: Normal rate and regular rhythm. Pulses: Normal pulses. Heart sounds: Normal heart sounds. Pulmonary:      Effort: Pulmonary effort is normal.      Breath sounds: Normal breath sounds. Abdominal:      General: Bowel sounds are normal.      Palpations: Abdomen is soft. Skin:     General: Skin is warm and dry. Neurological:      Mental Status: She is alert. Mental status is at baseline. Motor: Weakness present. Gait: Gait abnormal.   Psychiatric:         Mood and Affect: Mood normal.         Behavior: Behavior normal.         Assessment and Plan:      1. Osteoarthritis of multiple joints, unspecified osteoarthritis type / Pain syndrome, chronic:  Continue Hydrocodone/APAP 5-325 mg po every 12 hours as needed. Script written. I have reviewed the patient's medical history in detail and updated the computerized patient record.     Elizabeth Campos, BENJY-CNP

## 2020-01-27 ENCOUNTER — OFFICE VISIT (OUTPATIENT)
Dept: GERIATRIC MEDICINE | Age: 85
End: 2020-01-27
Payer: COMMERCIAL

## 2020-01-27 LAB
ALBUMIN SERPL-MCNC: 2.9 G/DL (ref 3.5–4.6)
ALP BLD-CCNC: 75 U/L (ref 40–130)
ALT SERPL-CCNC: 16 U/L (ref 0–33)
ANION GAP SERPL CALCULATED.3IONS-SCNC: 13 MEQ/L (ref 9–15)
AST SERPL-CCNC: 22 U/L (ref 0–35)
BILIRUB SERPL-MCNC: 0.3 MG/DL (ref 0.2–0.7)
BUN BLDV-MCNC: 14 MG/DL (ref 8–23)
CALCIUM SERPL-MCNC: 9.3 MG/DL (ref 8.5–9.9)
CHLORIDE BLD-SCNC: 102 MEQ/L (ref 95–107)
CO2: 24 MEQ/L (ref 20–31)
CREAT SERPL-MCNC: 0.48 MG/DL (ref 0.5–0.9)
GFR AFRICAN AMERICAN: >60
GFR NON-AFRICAN AMERICAN: >60
GLOBULIN: 4.5 G/DL (ref 2.3–3.5)
GLUCOSE BLD-MCNC: 82 MG/DL (ref 70–99)
HCT VFR BLD CALC: 32.9 % (ref 37–47)
HEMOGLOBIN: 10.6 G/DL (ref 12–16)
MCH RBC QN AUTO: 28 PG (ref 27–31.3)
MCHC RBC AUTO-ENTMCNC: 32.3 % (ref 33–37)
MCV RBC AUTO: 86.7 FL (ref 82–100)
PDW BLD-RTO: 15 % (ref 11.5–14.5)
PLATELET # BLD: 412 K/UL (ref 130–400)
POTASSIUM SERPL-SCNC: 4.5 MEQ/L (ref 3.4–4.9)
RBC # BLD: 3.8 M/UL (ref 4.2–5.4)
SODIUM BLD-SCNC: 139 MEQ/L (ref 135–144)
TOTAL PROTEIN: 7.4 G/DL (ref 6.3–8)
WBC # BLD: 6.6 K/UL (ref 4.8–10.8)

## 2020-01-27 PROCEDURE — G8484 FLU IMMUNIZE NO ADMIN: HCPCS | Performed by: INTERNAL MEDICINE

## 2020-01-27 PROCEDURE — 99308 SBSQ NF CARE LOW MDM 20: CPT | Performed by: INTERNAL MEDICINE

## 2020-01-27 PROCEDURE — 1123F ACP DISCUSS/DSCN MKR DOCD: CPT | Performed by: INTERNAL MEDICINE

## 2020-02-24 NOTE — PROGRESS NOTES
PATIENTRashmi Garsia : 1933 DOS: 2020     [facility]    Seen for routine monthly visit for dementia, dysphagia, status post CVA, lupus. MEDICATIONS:  Reviewed. SUBJECTIVE:  This 80-year-old woman was evaluated in her room. The patient has had recurrent pneumonia, clinically stable at this time. There are ongoing concerns for aspiration. Has been seen by speech therapy in the past.  Nursing staff remain concerned about the patient's general decline in function. Over the last year, the patient has not had any new emesis, fevers, or chills. The patient has not had any new bleeding diathesis. The patient is globally weak. REVIEW OF SYSTEMS:  The patient is somewhat confused, cannot provide coherent narration in terms of 14 point review of systems. OBJECTIVE DATA:  She appeared frail. Pupils are small, minimally reactive. Oral mucosa is dry. Chest showed no crackles, wheezing. Cardiovascular exam showed a regular rate. Abdomen is soft, nontender. Extremities showed trace dorsal pedal pulse, no calf tenderness. Skin showed no evidence of rash. Lymph:  No axillary or inguinal lymphadenopathy. ASSESSMENT AND PLAN:  1. Hypertension:  The patient is on beta blockers, is on thiazide diuretic. Monitoring renal panel. Monitoring electrolytes. 2.   Status post CVA:  The patient again is on blood pressure control. Has been on statin therapy as well as Plavix and aspirin. No bleeding diathesis. 3.   Cognitive impairment:  The patient is at her baseline. Currently is not on aggressive interventions. Consider her for Exelon patch. We will monitor closely.            ______________________________  HOMER Mahan.Parish  D: 2020 11:22:33  T: 2020 12:36:52    cc: - Sandip Quinn

## 2020-02-28 ENCOUNTER — OFFICE VISIT (OUTPATIENT)
Dept: GERIATRIC MEDICINE | Age: 85
End: 2020-02-28
Payer: COMMERCIAL

## 2020-02-28 VITALS
OXYGEN SATURATION: 95 % | DIASTOLIC BLOOD PRESSURE: 66 MMHG | HEART RATE: 74 BPM | RESPIRATION RATE: 18 BRPM | SYSTOLIC BLOOD PRESSURE: 111 MMHG | TEMPERATURE: 97.6 F

## 2020-02-28 PROCEDURE — 1123F ACP DISCUSS/DSCN MKR DOCD: CPT | Performed by: NURSE PRACTITIONER

## 2020-02-28 PROCEDURE — 99309 SBSQ NF CARE MODERATE MDM 30: CPT | Performed by: NURSE PRACTITIONER

## 2020-02-28 PROCEDURE — G8484 FLU IMMUNIZE NO ADMIN: HCPCS | Performed by: NURSE PRACTITIONER

## 2020-02-28 NOTE — PROGRESS NOTES
([facility]  At baseline, oriented to person place and location today. Continue medications as ordered. 2. Late effects of CVA (cerebrovascular accident)  Continues to have dysphasia and right side hemiplegia. Continue regular diet with soft foods and nectar thick liquids    3. Essential hypertension  Blood pressures are within acceptable range. Continue medications as ordered. 4. Hypothyroidism, unspecified type  Synthroid 25 mcg p.o. daily. Monitor TSH twice a year with dose changes. Continue medication as ordered. Reviewed labs:  Yes    I have reviewed the patient's medical history in detail and updated the computerized patient record.     Leo Parra, BENJY-CNP

## 2020-04-01 ENCOUNTER — VIRTUAL VISIT (OUTPATIENT)
Dept: GERIATRIC MEDICINE | Age: 85
End: 2020-04-01
Payer: COMMERCIAL

## 2020-04-01 PROCEDURE — 1123F ACP DISCUSS/DSCN MKR DOCD: CPT | Performed by: INTERNAL MEDICINE

## 2020-04-01 PROCEDURE — 99308 SBSQ NF CARE LOW MDM 20: CPT | Performed by: INTERNAL MEDICINE

## 2020-04-08 RX ORDER — HYDROCODONE BITARTRATE AND ACETAMINOPHEN 5; 325 MG/1; MG/1
1 TABLET ORAL 2 TIMES DAILY PRN
Qty: 18 TABLET | Refills: 0 | Status: SHIPPED | OUTPATIENT
Start: 2020-04-08 | End: 2020-05-27 | Stop reason: SDUPTHER

## 2020-04-14 ENCOUNTER — VIRTUAL VISIT (OUTPATIENT)
Dept: GERIATRIC MEDICINE | Age: 85
End: 2020-04-14
Payer: COMMERCIAL

## 2020-04-14 PROCEDURE — 99308 SBSQ NF CARE LOW MDM 20: CPT | Performed by: NURSE PRACTITIONER

## 2020-04-19 PROBLEM — F41.9 ANXIETY: Status: ACTIVE | Noted: 2020-04-19

## 2020-04-19 NOTE — PROGRESS NOTES
Name: Hilda Naomie: [facility]   Kym rosales  4/14/2020    TELEHEALTH EVALUATION -- Audio/Visual (During LSTMU-28 public health emergency)    Due to [place] 19 outbreak, patient's office visit was converted to a virtual visit. Patient was contacted and agreed to proceed with a virtual visit via \"Doxy. me. \"  The risks and benefits of converting to a virtual visit were discussed in light of the current infectious disease epidemic. Patient also understood that insurance coverage and co-pays are up to their individual insurance plans. Chief Complaint   Patient presents with    Anxiety     GDR per pharmacy recommendation       HPI:  Sarah Brar is a 80 y.o. female being seen today for an audio/video evaluation for the following concern(s):     Diagnosis Orders   1. Vascular dementia without behavioral disturbance ([facility] [address]     2. Anxiety       Resident is lying in bed, elderly, frail, in no acute distress. She is currently on BuSpirone 10 mg twice a day, buspirone 5 mg at bedtime, and Depakote 125 mg 2 caps twice a day. Pharmacy is suggesting review of the following psychoactive medications due to CMS regulations to evaluate for trial dose reduction. Nursing staff report that resident has been without behaviors for quite some time. The above medications have managed to control her behaviors, which she has been on for quite some time. Proposed changes are to discontinue BuSpirone 5 mg every at bedtime and to continue BuSpirone 10 mg twice daily and Depakote 125 mg twice daily. Unable to obtain any significant subjective information as resident speech is incoherent secondary to dementia. She had been on hospice and has been discharged from their services. Will initiate gradual dose reduction as suggested by pharmacy. Review of Systems: Staff report no signs and symptoms of chest pain, chest palpitations, nausea, vomiting, diarrhea.   Unable to obtain

## 2020-04-30 NOTE — PROGRESS NOTES
Lavonne Peters is a 80 y.o. female being evaluated by a Virtual Visit (video visit) encounter to address concerns as mentioned above. A caregiver was present when appropriate. Due to this being a TeleHealth encounter (During JPR-41 public health emergency), evaluation of the following organ systems was limited: Vitals/Constitutional/EENT/Resp/CV/GI//MS/Neuro/Skin/Heme-Lymph-Imm. Pursuant to the emergency declaration under the [address] [place] [address] and the [employer] Act, this Virtual Visit was conducted with patient's (and/or legal guardian's) consent, to reduce the patient's risk of exposure to COVID-19 and provide necessary medical care. The patient (and/or legal guardian) has also been advised to contact this office for worsening conditions or problems, and seek emergency medical treatment and/or call 911 if deemed necessary. Patient identification was verified at the start of the visit: Yes    Total time spent for this encounter: Not billed by time    Services were provided through a video synchronous discussion virtually to substitute for in-person clinic visit. Patient and provider were located at their individual homes. --Arash Guillermo MD on 2020 at 7:01 PM    An electronic signature was used to authenticate this note. PATIENT: Janae Calles : 1933 DOS: 2020     [facility]    Seen for a routine monthly visit for her adult failure to thrive, dementia, protein malnutrition, weakness. This was an 68-year-old woman seen with the nursing staff present for virtual visit via doxy. me lina. SUBJECTIVE:  The patient at her baseline, frail, globally alert. Weight had been up slightly. The patient has been off hospice, at this time not suffering any evidence of any new acute pain crisis. The patient has not had new chest pain or palpitations.   Oral intake has been

## 2020-05-29 RX ORDER — HYDROCODONE BITARTRATE AND ACETAMINOPHEN 5; 325 MG/1; MG/1
1 TABLET ORAL 2 TIMES DAILY PRN
Qty: 40 TABLET | Refills: 0 | Status: SHIPPED | OUTPATIENT
Start: 2020-05-29 | End: 2020-06-28

## 2020-06-11 ENCOUNTER — OFFICE VISIT (OUTPATIENT)
Dept: GERIATRIC MEDICINE | Age: 85
End: 2020-06-11
Payer: COMMERCIAL

## 2020-06-11 PROCEDURE — 1123F ACP DISCUSS/DSCN MKR DOCD: CPT | Performed by: INTERNAL MEDICINE

## 2020-06-11 PROCEDURE — 99309 SBSQ NF CARE MODERATE MDM 30: CPT | Performed by: INTERNAL MEDICINE

## 2020-06-24 NOTE — PROGRESS NOTES
Sent Pressy message to patient to schedule an appointment.  Beth COOPERN,RN  [facility]     Subjective: The patient complains of severe  acute  right-sided weakness and aphasia and dysphagia partially relieved by  PT, OT, speech pathology and exacerbated by  overexertion and recent left cerebral infarcts. I am concerned about patients progressive depression and chronic pain issues as well as her need for Percocet for her back and neck pain. I'm concerned about her poor participation in therapy. Though I washed her therapy today and she seemed to be doing better. She duplex of her arm just today which was unremarkable chest x-ray was also unremarkable. Considering using IV albumin but for now we'll push Alfredo protein supplementation and monitoring her blood pressure. ROS x10: The patient also complains of severely impaired mobility and activities of daily living. Otherwise no new problems with vision, hearing, nose, mouth, throat, dermal, cardiovascular, GI, , pulmonary, musculoskeletal, psychiatric or neurological. See Rehab H&P on Rehab chart dated . Vital signs:  BP (!) 180/67   Pulse 70   Temp 97 °F (36.1 °C) (Oral)   Resp 18   SpO2 97%   I/O:   PO/Intake:  fair PO intake, no problems observed or reported. Bowel/Bladder:   Incontinent of bowel and bladder  General:  Patient is  frail and cachectic, adequately nourished, non-obese and     well kempt. HEENT:    PERRLA, hearing intact to loud voice, external inspection of ear     and nose benign. Inspection of lips, tongue and gums benign  Musculoskeletal: No significant change in strength or tone. All joints stable. Inspection and palpation of digits and nails show no clubbing,       cyanosis or inflammatory conditions. Neuro/Psychiatric: Affect: flat but pleasant. Alert and oriented to person, place and     situation. No significant change in deep tendon reflexes or     Sensation-right hemiparesis secondary CVA  Lungs:  Diminished but CTA-B. Respiration effort is normal at rest.     Heart:   S1 = S2, RRR. occur  Dressing-Lower: 0 - Did not occur  Toiletin - Did not occur  Toilet Transfer: 0 - Did not occur  Shower Transfer: 0 - Activity does not occur,  , Assessment: Pt. continues to be alert but her daughter reports decreased ability to word find. Pt. demos G standing tolerance and balance and G endurance overall. Pt. engaged well with OT tasks but continues to benefit to improve independence, balance and safety with ADLs.      Speech therapy: FIMS: Comprehension: 2 - Patient understands basic needs 25-49% of the time  Expression: 2 - Expresses basic ideas/needs 25-49% of the time  Social Interaction: 2 - Patient appropriate  25%-49% of the time  Problem Solvin - Patient solves simple/routine tasks < 25%  Memory: 1 - Patient remembers < 25% of the time      Lab/X-ray studies reviewed, analyzed and discussed with patient and staff:   Recent Results (from the past 24 hour(s))   Basic Metabolic Panel    Collection Time: 18  5:31 PM   Result Value Ref Range    Sodium 129 (L) 132 - 144 mEq/L    Potassium 3.9 3.5 - 5.1 mEq/L    Chloride 89 (L) 98 - 107 mEq/L    CO2 24 22 - 29 mEq/L    Anion Gap 16 (H) 7 - 13 mEq/L    Glucose 90 74 - 109 mg/dL    BUN 15 8 - 23 mg/dL    CREATININE 0.77 0.50 - 0.90 mg/dL    GFR Non-African American >60.0 >60    GFR  >60.0 >60    Calcium 8.9 8.6 - 10.2 mg/dL   EKG 12 Lead    Collection Time: 18  4:12 AM   Result Value Ref Range    Ventricular Rate 62 BPM    Atrial Rate 62 BPM    P-R Interval 252 ms    QRS Duration 150 ms    Q-T Interval 538 ms    QTc Calculation (Bazett) 546 ms    R Axis -16 degrees    T Axis 97 degrees   CBC    Collection Time: 18  6:00 AM   Result Value Ref Range    WBC 8.2 4.8 - 10.8 K/uL    RBC 3.52 (L) 4.20 - 5.40 M/uL    Hemoglobin 10.4 (L) 12.0 - 16.0 g/dL    Hematocrit 30.9 (L) 37.0 - 47.0 %    MCV 87.7 82.0 - 100.0 fL    MCH 29.4 27.0 - 31.3 pg    MCHC 33.6 33.0 - 37.0 %    RDW 17.5 (H) 11.5 - 14.5 %    Platelets [phone]

## 2020-06-30 NOTE — PROGRESS NOTES
Patient Name: Rosanna Art  YOB: 1933  Medical Record Number: [id number]      History of Present Illness: This 79-year-old woman was seen in her room today for a routine monthly visit for her prior to malnutrition hypertension dementia. Patient has ongoing slow decline. Patient has been on hospice services in the past.  Continue supportive care. Encouraging intake as able. Pain is controlled. No new behavioral issues at this time. Review of Systems   Unable to perform ROS: Dementia       Review of Systems: All 14 review of systems negative other than as stated above    Social History     Tobacco Use    Smoking status: Never Smoker    Smokeless tobacco: Never Used   Substance Use Topics    Alcohol use: No    Drug use: No     Comment: unable to answer at this time. Past Medical History:   Diagnosis Date    Abnormality of gait and mobility due to Right hemiparesis secondary to Left Cerebral Infarcts. [facility] admit 06/25/18. 6/25/2018    This is an 80year old female who was brought to there ER with altered mental status. She was confused the morning of admission; she woke up and was sitting on the edge of the bed and fell to the side. In the ER she was noted to be hypertensive to 050;M systolic and bradycardic to 30's. She was noted to have a complete heart block with non ST elevation infarction.   Taken to the cath lab and had    Anemia 6/25/2018    Complete heart block ([address] 6/26/2018    Dysphagia, oropharyngeal phase 6/26/2018    History of PTCA with stents 6/25/2018    [place] (hard of hearing) 6/25/2018    HTN (hypertension) 6/25/2018    Hypothyroidism 6/25/2018    Incontinent of urine 6/26/2018    MI (myocardial infarction) ([address] 6/25/2018    Osteoarthritis of multiple joints 1/15/2020    Pneumonia of both lungs due to infectious organism     Right hemiparesis ([address] 6/25/2018    Severe protein-calorie malnutrition ([address] 6/22/2018    Vascular dementia without behavioral disturbance ([facility] [address] 7/30/2018           No past surgical history on file. Current Outpatient Medications on File Prior to Visit   Medication Sig Dispense Refill    losartan (COZAAR) 50 MG tablet Take 1 tablet by mouth 2 times daily 30 tablet 3    miconazole (MICOTIN) 2 % powder Apply topically 2 times daily. 45 g 1    hydrochlorothiazide (HYDRODIURIL) 25 MG tablet Take 0.5 tablets by mouth daily 30 tablet 3    levothyroxine (SYNTHROID) 25 MCG tablet Take 1 tablet by mouth Daily 30 tablet 3    metoprolol succinate (TOPROL XL) 25 MG extended release tablet Take 1 tablet by mouth daily 30 tablet 3    clopidogrel (PLAVIX) 75 MG tablet Take 1 tablet by mouth daily 30 tablet 3    simvastatin (ZOCOR) 20 MG tablet Take 1 tablet by mouth nightly 30 tablet 3    aspirin 81 MG chewable tablet Take 1 tablet by mouth daily 30 tablet 3     No current facility-administered medications on file prior to visit. Allergies   Allergen Reactions    Codeine      Chest pains         No family history on file. Physical Exam:      Physical Exam   Constitutional:   Frail in appearance with bilateral temporal wasting   HENT:   Head: Normocephalic and atraumatic. Eyes: EOM are normal. Left eye exhibits no discharge. Neck: Neck supple. No JVD present. Cardiovascular: Normal rate. Pulmonary/Chest: Effort normal and breath sounds normal. She has no wheezes. Abdominal: Soft. Bowel sounds are normal. She exhibits no distension. There is no abdominal tenderness. Musculoskeletal:         General: No edema. Skin: Skin is warm and dry. Psychiatric: She has a normal mood and affect. Nursing note and vitals reviewed. not currently breastfeeding.       .   Lab Results   Component Value Date    WBC 6.6 01/27/2020    HGB 10.6 (L) 01/27/2020    HCT 32.9 (L) 01/27/2020    MCV 86.7 01/27/2020     (H) 01/27/2020     Lab Results   Component Value Date     01/27/2020    K 4.5 01/27/2020    K 4.0

## 2020-07-01 ENCOUNTER — OFFICE VISIT (OUTPATIENT)
Dept: GERIATRIC MEDICINE | Age: 85
End: 2020-07-01
Payer: COMMERCIAL

## 2020-07-01 VITALS
SYSTOLIC BLOOD PRESSURE: 110 MMHG | OXYGEN SATURATION: 96 % | RESPIRATION RATE: 18 BRPM | HEART RATE: 65 BPM | TEMPERATURE: 97 F | DIASTOLIC BLOOD PRESSURE: 78 MMHG

## 2020-07-01 PROCEDURE — 99308 SBSQ NF CARE LOW MDM 20: CPT | Performed by: NURSE PRACTITIONER

## 2020-07-01 PROCEDURE — 1123F ACP DISCUSS/DSCN MKR DOCD: CPT | Performed by: NURSE PRACTITIONER

## 2020-07-06 LAB
CHOLESTEROL, TOTAL: 132 MG/DL
CHOLESTEROL, TOTAL: 132 MG/DL (ref 0–199)
HDLC SERPL-MCNC: 30 MG/DL (ref 40–59)
LDL CHOLESTEROL CALCULATED: 84 MG/DL (ref 0–129)
TRIGL SERPL-MCNC: 91 MG/DL (ref 0–150)
TSH SERPL DL<=0.05 MIU/L-ACNC: 5.17 UIU/ML (ref 0.44–3.86)
TSH SERPL DL<=0.05 MIU/L-ACNC: NORMAL M[IU]/L

## 2020-07-16 PROBLEM — B37.9 CANDIDIASIS: Status: ACTIVE | Noted: 2020-07-16

## 2020-07-16 PROBLEM — Z13.220 SCREENING FOR CHOLESTEROL LEVEL: Status: ACTIVE | Noted: 2020-07-16

## 2020-07-16 NOTE — PROGRESS NOTES
Name: Rosalio Showers: [facility]  [address]   Kym rosales  Date: 7/1/2020     Subjective:     Chief Complaint   Patient presents with    Follow-up       HPI  Chuy Pinzon is a 80 y.o. female being seen today for evaluation of unnecessary medications and review of quarterly labs. Resident is lying in bed, no acute distress. Nursing staff report that she is on miconazole powder which she has not been using for she does not have an antifungal rash at this time. They would like to have something PRN as needed for fungal rash as she is incontinent of bowel and bladder. Staff also report that she is not been using Norco for quite some time nor the diclofenac gel for pain. Want to reduce pill burden and polypharmacy so we will adjust medications. Quarterly labs have been on hold due to the coronavirus pandemic. Will allow lab to come in now for quarterly labs. Resident denies complaints of chest pain, shortness of breath, nausea vomiting, diarrhea constipation, lightheadedness dizziness. Vital signs stable no fever. Past Medical History:   Diagnosis Date    Abnormality of gait and mobility due to Right hemiparesis secondary to Left Cerebral Infarcts. [facility] admit 06/25/18. 6/25/2018    This is an 80year old female who was brought to there ER with altered mental status. She was confused the morning of admission; she woke up and was sitting on the edge of the bed and fell to the side. In the ER she was noted to be hypertensive to 173;E systolic and bradycardic to 30's. She was noted to have a complete heart block with non ST elevation infarction.   Taken to the cath lab and had    Anemia 6/25/2018    Complete heart block ([address] 6/26/2018    Dysphagia, oropharyngeal phase 6/26/2018    History of PTCA with stents 6/25/2018    [place] (hard of hearing) 6/25/2018    HTN (hypertension) 6/25/2018    Hypothyroidism 6/25/2018    Incontinent of urine 6/26/2018    [facility]    Osteoarthritis of multiple joints 1/15/2020    Pneumonia of both lungs due to infectious organism     Right hemiparesis ([facility] [address] 6/25/2018    Severe protein-calorie malnutrition ([facility] [address] 6/22/2018    Vascular dementia without behavioral disturbance [facility] 7/30/2018       Allergies:  Reviewed in nursing facility records  Medications:  Reviewed and reconciled in nursing facility records    Review of Systems Pertinent positives as noted in HPI. Objective:   /78   Pulse 65   Temp 97 °F (36.1 °C)   Resp 18   SpO2 96%     Physical Exam  Constitutional:       Appearance: Normal appearance. HENT:      Head: Normocephalic. Eyes:      Conjunctiva/sclera: Conjunctivae normal.   Neck:      Musculoskeletal: Normal range of motion and neck supple. Cardiovascular:      Rate and Rhythm: Normal rate and regular rhythm. Pulses: Normal pulses. Heart sounds: Normal heart sounds. Pulmonary:      Effort: Pulmonary effort is normal.      Breath sounds: Normal breath sounds. Abdominal:      General: Abdomen is flat. Bowel sounds are normal.   Genitourinary:     Vagina: No vaginal discharge. Skin:     General: Skin is warm. Findings: No rash. Neurological:      Mental Status: She is alert. Mental status is at baseline. Diagnosis Orders   1. Pain syndrome, chronic     2. Osteoarthritis of multiple joints, unspecified osteoarthritis type     3. Candidiasis     4. Hypothyroidism, unspecified type     5. Screening for cholesterol level         Assessment and Plan:      1. Pain syndrome, chronic /Osteoarthritis of multiple joints, unspecified osteoarthritis type   Not complaining of any pain. DC Norco, no use. DC diclofenac. 2. Candidiasis  DC miconazole powder. Over-the-counter antifungal powder every shift as needed for fungal rash. 3. Hypothyroidism, unspecified type  TSH Monday    4.  Screening for cholesterol level  Lipid panel Monday      Reviewed labs:  Yes    I have reviewed the patient's medical history in detail and updated the computerized patient record. This note was partially generated using Dragon voice recognition system, and there may be some incorrect words, spellings, punctuation that were not noticed in checking the note before saving.     Kaye Petersen, BENJY-CNP

## 2020-07-27 LAB
ALBUMIN SERPL-MCNC: 2.6 G/DL (ref 3.5–4.6)
ALP BLD-CCNC: 56 U/L (ref 40–130)
ALT SERPL-CCNC: 11 U/L (ref 0–33)
ANION GAP SERPL CALCULATED.3IONS-SCNC: 13 MEQ/L (ref 9–15)
AST SERPL-CCNC: 16 U/L (ref 0–35)
BILIRUB SERPL-MCNC: 0.4 MG/DL (ref 0.2–0.7)
BUN BLDV-MCNC: 21 MG/DL (ref 8–23)
CALCIUM SERPL-MCNC: 8.8 MG/DL (ref 8.5–9.9)
CHLORIDE BLD-SCNC: 104 MEQ/L (ref 95–107)
CO2: 22 MEQ/L (ref 20–31)
CREAT SERPL-MCNC: 0.46 MG/DL (ref 0.5–0.9)
GFR AFRICAN AMERICAN: >60
GFR NON-AFRICAN AMERICAN: >60
GLOBULIN: 4.7 G/DL (ref 2.3–3.5)
GLUCOSE BLD-MCNC: 81 MG/DL (ref 70–99)
HCT VFR BLD CALC: 31.1 % (ref 37–47)
HEMOGLOBIN: 10 G/DL (ref 12–16)
MCH RBC QN AUTO: 28.6 PG (ref 27–31.3)
MCHC RBC AUTO-ENTMCNC: 32.3 % (ref 33–37)
MCV RBC AUTO: 88.7 FL (ref 82–100)
PDW BLD-RTO: 14.3 % (ref 11.5–14.5)
PLATELET # BLD: 483 K/UL (ref 130–400)
POTASSIUM SERPL-SCNC: 4.1 MEQ/L (ref 3.4–4.9)
RBC # BLD: 3.51 M/UL (ref 4.2–5.4)
SODIUM BLD-SCNC: 139 MEQ/L (ref 135–144)
TOTAL PROTEIN: 7.3 G/DL (ref 6.3–8)
WBC # BLD: 9.1 K/UL (ref 4.8–10.8)

## 2020-07-31 LAB — PREALBUMIN: 7.7 MG/DL (ref 20–40)

## 2020-08-04 ENCOUNTER — OFFICE VISIT (OUTPATIENT)
Dept: GERIATRIC MEDICINE | Age: 85
End: 2020-08-04
Payer: COMMERCIAL

## 2020-08-04 PROCEDURE — 99308 SBSQ NF CARE LOW MDM 20: CPT | Performed by: NURSE PRACTITIONER

## 2020-08-04 PROCEDURE — 1123F ACP DISCUSS/DSCN MKR DOCD: CPT | Performed by: NURSE PRACTITIONER

## 2020-08-11 VITALS
SYSTOLIC BLOOD PRESSURE: 108 MMHG | OXYGEN SATURATION: 95 % | TEMPERATURE: 98.5 F | WEIGHT: 95.3 LBS | BODY MASS INDEX: 18.61 KG/M2 | DIASTOLIC BLOOD PRESSURE: 69 MMHG | RESPIRATION RATE: 18 BRPM | HEART RATE: 60 BPM

## 2020-08-12 NOTE — PROGRESS NOTES
Name: Claudine Ricardo: [facility]  Kym Jerome  Date: 8/4/2020     Subjective:     Chief Complaint   Patient presents with    Follow-up       HPI  Shaquille He is a 80 y.o. female being seen today for medication management as requested by consultant pharmacist.  Pharmacist is requesting a attempt at a gradual dose reduction for residents Depakote sprinkles that she takes twice a day for dementia and anxiety. Federal guidelines require annual GDR consideration. Resident is currently being treated with Depakote sprinkles for vascular dementia with behavioral disturbance. She has had an overall decline over the past 2 to 3 months, weight loss, is down to 93.5 pounds, refuses to eat, and has a prealbumin level of 7.7. Dietitian is added many protein supplements Ensure in order to enhance calories; have been unsuccessful in getting her to take them. Facility staff are looking to speak with family members about hospice. Resident has not exhibited any behaviors lately therefore an attempt to reduce her Depakote sprinkles is appropriate. Vital signs are stable no fever. Resident is mostly nonverbal, will follow an occasional command, but has declined overall. Past Medical History:   Diagnosis Date    Abnormality of gait and mobility due to Right hemiparesis secondary to Left Cerebral Infarcts. [facility] admit 06/25/18. 6/25/2018    This is an 80year old female who was brought to there ER with altered mental status. She was confused the morning of admission; she woke up and was sitting on the edge of the bed and fell to the side. In the ER she was noted to be hypertensive to 841;T systolic and bradycardic to 30's. She was noted to have a complete heart block with non ST elevation infarction.   Taken to the cath lab and had    Anemia 6/25/2018    Complete heart block ([address] 6/26/2018    Dysphagia, oropharyngeal phase 6/26/2018    History of PTCA with stents 6/25/2018    [place] (hard of hearing) 6/25/2018    HTN (hypertension) 6/25/2018    Hypothyroidism 6/25/2018    Incontinent of urine 6/26/2018    [facility]    Osteoarthritis of multiple joints 1/15/2020    Pneumonia of both lungs due to infectious organism     [facility] 6/25/2018    Severe protein-calorie malnutrition ([facility] 6/22/2018    Vascular dementia without behavioral disturbance ([facility] 7/30/2018       Allergies:  Reviewed in nursing facility records  Medications:  Reviewed and reconciled in nursing facility records    Review of Systems Pertinent positives as noted in HPI. Unable to obtain any subjective information due to patient choosing to be nonverbal at this time      Objective:   /69   Pulse 60   Temp 98.5 °F (36.9 °C)   Resp 18   Wt 95 lb 4.8 oz (43.2 kg)   SpO2 95%   BMI 18.61 kg/m²     Physical Exam Constitutional: Lying in bed, cachectic, elderly, frail, in no acute distess  Pulmonary/Chest:  breathing unlabored, chest excursion symmetrical, no use of accessory muscles, lung sounds clear, no shortness of breath, no dyspnea  Cardiovascular:  S1-S2 regular, murmur, 2+ DP x 2, no edema  Abd/GI:  abdomen soft, round, non-distended, non-tender, no masses, active bowel sounds x 4 quadrants  MS/Extremities:  HEARD, ROM limited, nonambulatory, no clubbing, no cyanosis  Psych:  A/O x 1, cooperative with care at times, no anxiety, depressed mood and flat affect,   Skin:  warm and dry, color normal, no lesions, no rashes       Diagnosis Orders   1. Vascular dementia without behavioral disturbance ([facility]     2. Anxiety         Assessment and Plan:      1. Vascular dementia without behavioral disturbance (HCC) / Anxiety  Reduce the dose of Depakote sprinkles to 125 mg p.o. twice daily      Reviewed labs:  Yes    I have reviewed the patient's medical history in detail and updated the computerized patient record.     This note was partially generated using Dragon voice recognition system, and there may be some incorrect words, spellings, punctuation that were not noticed in checking the note before saving.     Sarah Ma, BENJY-CNP

## 2020-08-15 PROBLEM — Z13.220 SCREENING FOR CHOLESTEROL LEVEL: Status: RESOLVED | Noted: 2020-07-16 | Resolved: 2020-08-15

## 2020-10-30 ENCOUNTER — VIRTUAL VISIT (OUTPATIENT)
Dept: GERIATRIC MEDICINE | Age: 85
End: 2020-10-30
Payer: COMMERCIAL

## 2020-10-30 PROCEDURE — 99308 SBSQ NF CARE LOW MDM 20: CPT | Performed by: INTERNAL MEDICINE

## 2020-10-30 PROCEDURE — 1123F ACP DISCUSS/DSCN MKR DOCD: CPT | Performed by: INTERNAL MEDICINE

## 2020-10-30 PROCEDURE — G8484 FLU IMMUNIZE NO ADMIN: HCPCS | Performed by: INTERNAL MEDICINE

## 2020-11-03 ENCOUNTER — VIRTUAL VISIT (OUTPATIENT)
Dept: GERIATRIC MEDICINE | Age: 85
End: 2020-11-03
Payer: COMMERCIAL

## 2020-11-03 VITALS
OXYGEN SATURATION: 98 % | RESPIRATION RATE: 18 BRPM | TEMPERATURE: 96.7 F | DIASTOLIC BLOOD PRESSURE: 76 MMHG | SYSTOLIC BLOOD PRESSURE: 173 MMHG | HEART RATE: 69 BPM

## 2020-11-03 PROBLEM — U07.1 COVID-19: Status: ACTIVE | Noted: 2020-11-03

## 2020-11-03 PROBLEM — R62.7 ADULT FAILURE TO THRIVE: Status: ACTIVE | Noted: 2020-11-03

## 2020-11-03 PROCEDURE — 1123F ACP DISCUSS/DSCN MKR DOCD: CPT | Performed by: NURSE PRACTITIONER

## 2020-11-03 PROCEDURE — 99309 SBSQ NF CARE MODERATE MDM 30: CPT | Performed by: NURSE PRACTITIONER

## 2020-11-03 ASSESSMENT — ENCOUNTER SYMPTOMS
COLOR CHANGE: 0
SHORTNESS OF BREATH: 0
CONSTIPATION: 0
VOMITING: 0
DIARRHEA: 0
ABDOMINAL DISTENTION: 0
ABDOMINAL PAIN: 0
COUGH: 1

## 2020-11-03 NOTE — PROGRESS NOTES
lungs due to infectious organism     Right hemiparesis ([facility].) 6/25/2018    Severe protein-calorie malnutrition ([facility] 6/22/2018    Vascular dementia without behavioral disturbance ([facility] 7/30/2018        PHYSICAL EXAMINATION:  [ INSTRUCTIONS:  \"[x]\" Indicates a positive item  \"[]\" Indicates a negative item  -- DELETE ALL ITEMS NOT EXAMINED]    BP (!) 173/76   Pulse 69   Temp 96.7 °F (35.9 °C)   Resp 18   SpO2 98%       [x] Alert  [x] Oriented to person    [x] No apparent distress  [] Toxic appearing    [] Face flushed appearing [x] Sclera clear  [] Lips are cyanotic      [x] Breathing appears normal  [] Appears tachypneic      [] Rash on visible skin    [] Cranial Nerves II-XII grossly intact    [x] Motor grossly intact in visible upper extremities    [x] Motor grossly intact in visible lower extremities    [] Normal Mood  [] Anxious appearing    [] Depressed appearing  [x] Confused appearing      [x] Poor short term memory  [x] Poor long term memory    [x] OTHER:      Physical Exam Constitutional:  up in chair, elderly, frail, in no acute distress  ENT:  conjunctive/lids normal, MMM, no runny nose  Pulmonary/Chest:  breathing unlabored, chest excursion symmetrical, no use of accessory muscles, no shortness of breath, no dyspnea. Lung sounds clear per nursing staff  Cardiovascular:  heart rate normal per point click care vital signs, no edema  Abd/GI:  abdomen, round, non-distended, positive bowel sounds per nursing staff  MS/Extremities:  HEARD, ROM limited, no clubbing, no cyanosis  Psych:  A/O x 1, cooperative with care, no anxiety, severe cognitive impairment, answers some yes/no questions  Skin:  color normal, no lesions, no rashes       Due to this being a TeleHealth encounter, evaluation of the following organ systems is limited: Vitals/Constitutional/EENT/Resp/CV/GI//MS/Neuro/Skin/Heme-Lymph-Imm. ASSESSMENT/PLAN:  1. COVID-19  Resident is asymptomatic. In isolation for covid precautions.   No labs obtained d/t hospice and family request.  Vital signs every shift. Oxygen saturation 98% on room air. Apply oxygen at 2L per nasal canula to maintain SPO2 greater than 90%. 2. Vascular dementia without behavioral disturbance (HCC)  At baseline. Continue current medications as ordered. 3. Adult failure to thrive  At baseline, appetite unchanged, fluid intake encouraged. 4. Dysphagia, oropharyngeal phase  At baseline. Continue diet as ordered. 5. Hypertension  Blood pressure elevated today. Repeat blood pressure is 146/77. Continue Metoprolol and Amlodipine. 6. Anxiety  Continue Buspirone and Depakote. An  electronic signature was used to authenticate this note. --BENJY Iqbal - CNP on 11/5/2020 at 2:48 PM    Reviewed labs:  No    Pursuant to the emergency declaration under the [facility], [facility] waiver authority and the [employer] Act, this Virtual  Visit was conducted, with patient's consent, to reduce the patient's risk of exposure to COVID-19 and provide continuity of care for an established patient. Services were provided through a video synchronous discussion virtually to substitute for in-person clinic visit. Discussed above plan with Dr. Rush Talavera, he agrees with it. This note was partially generated using Dragon voice recognition system, and there may be some incorrect words, spellings, punctuation that were not noticed in checking the note before saving.

## 2020-11-06 ENCOUNTER — VIRTUAL VISIT (OUTPATIENT)
Dept: GERIATRIC MEDICINE | Age: 85
End: 2020-11-06
Payer: COMMERCIAL

## 2020-11-06 PROCEDURE — 99308 SBSQ NF CARE LOW MDM 20: CPT | Performed by: NURSE PRACTITIONER

## 2020-11-07 ENCOUNTER — VIRTUAL VISIT (OUTPATIENT)
Dept: GERIATRIC MEDICINE | Age: 85
End: 2020-11-07
Payer: COMMERCIAL

## 2020-11-07 PROCEDURE — 1123F ACP DISCUSS/DSCN MKR DOCD: CPT | Performed by: NURSE PRACTITIONER

## 2020-11-07 PROCEDURE — 99308 SBSQ NF CARE LOW MDM 20: CPT | Performed by: NURSE PRACTITIONER

## 2020-11-09 VITALS
TEMPERATURE: 97 F | HEART RATE: 77 BPM | SYSTOLIC BLOOD PRESSURE: 130 MMHG | DIASTOLIC BLOOD PRESSURE: 80 MMHG | RESPIRATION RATE: 20 BRPM | OXYGEN SATURATION: 97 %

## 2020-11-09 ASSESSMENT — ENCOUNTER SYMPTOMS
SHORTNESS OF BREATH: 0
DIARRHEA: 0
ABDOMINAL PAIN: 0
VOMITING: 0
CONSTIPATION: 0
COUGH: 0
NAUSEA: 0
COLOR CHANGE: 0
ABDOMINAL DISTENTION: 0

## 2020-11-09 NOTE — PROGRESS NOTES
Name: Chana Agpao: [facility]   Kym rosales  11/6/2020    TELEHEALTH EVALUATION -- Audio/Visual (During JTTZP-95 public health emergency)    Due to [place] 19 outbreak, patient's office visit was converted to a virtual visit. Patient was contacted and agreed to proceed with a virtual visit via \"Doxy. me\" and nursing assistance on site. The risks and benefits of converting to a virtual visit were discussed in light of the current infectious disease epidemic. Patient also understood that insurance coverage and co-pays are up to their individual insurance plans. Chief Complaint   Patient presents with    Covid Testing     Positive       HPI:  Susanna Lundberg is a 80 y.o. female being seen today for an audio/video evaluation for the following concern(s):     Diagnosis Orders   1. COVID-19     2. Vascular dementia without behavioral disturbance ([facility]       Asked to see resident to evaluate for COVID-19. Resident tested positive this week via rapid Covid test.  She has been transferred to the Covid unit and will be in isolation. Nursing staff report that resident has not had any symptoms of shortness of breath, fever, or respiratory distress. She is currently on hospice services therefore no Covid labs will be done at this time unless symptoms occur. SPO2 is greater than 90% on room air. Appetite is at baseline, fluid intake is good, urine output is good. Resident's dementia with baseline mental status of confusion. She denies complaints of chest pain shortness of breath lightheadedness dizziness nausea pain when asked. We will continue to monitor closely and keep family informed of current status. Review of Systems   Constitutional: Positive for fatigue. Negative for activity change, appetite change and fever. HENT: Negative. Respiratory: Negative for cough and shortness of breath.     Cardiovascular: Negative for chest pain, palpitations and leg °C)   Resp 20   SpO2 97%       [x] Alert  [x] Oriented to person  [x] No apparent distress  [] Toxic appearing    [] Face flushed appearing [x] Sclera clear  [] Lips are cyanotic      [x] Breathing appears normal  [] Appears tachypneic      [] Rash on visible skin    [] Cranial Nerves II-XII grossly intact    [x] Motor grossly intact in visible upper extremities    [x] Motor grossly intact in visible lower extremities    [x] Normal Mood  [] Anxious appearing    [] Depressed appearing  [x] Confused appearing      [x] Poor short term memory  [x] Poor long term memory    [x] OTHER:      Physical Exam Constitutional:  up in wheelchair, elderly, frail, in no acute distress  ENT:  conjunctive/lids normal, MMM, no runny nose  Pulmonary/Chest:  breathing unlabored, chest excursion symmetrical, no use of accessory muscles, no shortness of breath, no dyspnea. Lung sounds clear per nursing staff  Cardiovascular:  heart rate normal per point click care vital signs, no edema  Abd/GI:  abdomen, round, non-distended, positive bowel sounds per nursing staff  MS/Extremities:  HEARD, ROM limited, no clubbing, no cyanosis  Psych:  A/O x 1, cooperative with care, no anxiety  Skin:  color normal, no lesions, no rashes    Due to this being a TeleHealth encounter, evaluation of the following organ systems is limited: Vitals/Constitutional/EENT/Resp/CV/GI//MS/Neuro/Skin/Heme-Lymph-Imm. ASSESSMENT/PLAN:  1. COVID-19  No Covid labs drawn, resident is on hospice services, no Covid symptoms at this time. Resident is without shortness of breath or signs of respiratory distress. Nursing staff report no fever. We will continue isolation and to monitor appetite and fluid intake. 2. Vascular dementia without behavioral disturbance ([facility]  At baseline, uncertain whether resident understands why she is in isolation or understands information given to her about Covid. No behaviors. Continue medications as ordered.       An  electronic signature was used to authenticate this note. --Sterling Loza, APRN - CNP on 11/9/2020 at 10:54 AM    Reviewed labs:  No    Pursuant to the emergency declaration under the [facility], [facility] waiver authority and the [employer] and [employer] Act, this Virtual  Visit was conducted, with patient's consent, to reduce the patient's risk of exposure to COVID-19 and provide continuity of care for an established patient. Services were provided through a video synchronous discussion virtually to substitute for in-person clinic visit. This note was partially generated using Dragon voice recognition system, and there may be some incorrect words, spellings, punctuation that were not noticed in checking the note before saving.

## 2020-11-11 ENCOUNTER — OFFICE VISIT (OUTPATIENT)
Dept: GERIATRIC MEDICINE | Age: 85
End: 2020-11-11
Payer: COMMERCIAL

## 2020-11-11 PROCEDURE — 99308 SBSQ NF CARE LOW MDM 20: CPT | Performed by: NURSE PRACTITIONER

## 2020-11-11 PROCEDURE — G8484 FLU IMMUNIZE NO ADMIN: HCPCS | Performed by: NURSE PRACTITIONER

## 2020-11-11 PROCEDURE — 1123F ACP DISCUSS/DSCN MKR DOCD: CPT | Performed by: NURSE PRACTITIONER

## 2020-11-30 VITALS
HEART RATE: 61 BPM | TEMPERATURE: 97.4 F | RESPIRATION RATE: 17 BRPM | SYSTOLIC BLOOD PRESSURE: 130 MMHG | OXYGEN SATURATION: 100 % | DIASTOLIC BLOOD PRESSURE: 65 MMHG

## 2020-11-30 NOTE — PROGRESS NOTES
Name: Elham Tsang: [facility]   Kym rosales  Date: 11/11/2020     Subjective:     Chief Complaint   Patient presents with   Mira Graver     Positive       HPI  Bienvenido Parr is a 80 y.o. female being seen today to evaluate for COVID-19 and resolution of symptoms. Resident tested positive more than a week ago week via rapid Covid test.  She has been transferred off the COVID unit and back to her room. Nursing staff report that resident has not had any symptoms of shortness of breath, fever, or respiratory distress. She is currently on hospice services therefore no Covid labs were done. SPO2 is greater than 90% on room air. Appetite is at baseline, fluid intake is good, urine output is good. Resident's dementia remains at baseline; oriented to person. She denies complaints of chest pain shortness of breath lightheadedness dizziness nausea pain when asked. We will continue to monitor closely and keep family informed of current status. Allergies:  Reviewed in nursing facility records  Medications:  Reviewed and reconciled in nursing facility records    Review of Systems Pertinent positives as noted in HPI. Objective:   /65   Pulse 61   Temp 97.4 °F (36.3 °C)   Resp 17   SpO2 100%     Physical Exam Constitutional:  up in wheelchair, elderly, frail, in no acute distess  Pulmonary/Chest:  lung sounds clear, no shortness of breath  Cardiovascular:  S1-S2 regular, no edema  Abd/GI:  abdomen soft, round, active bowel sounds x 4 quadrants  MS/Extremities:  HEARD, ROM limited, marylou lift  Psych:  A/O x 1, cooperative with care    Assessment and Plan:      1. COVID-19   Asymptomatic, no fever, vital signs stable. Continue current plan of care and routine Covid testing per facility policy. 2. Vascular dementia without behavioral disturbance (HCC)  At baseline, continue medications as ordered.       I have reviewed the patient's medical history in detail and updated the computerized patient record. This note was partially generated using Dragon voice recognition system, and there may be some incorrect words, spellings, punctuation that were not noticed in checking the note before saving.     Annabelle Singh, BENJY-CNP

## 2020-12-01 NOTE — PROGRESS NOTES
Jud Tello [facility]  Chief Complaint   Patient presents with    Other     covid       REASON FOR VISIT:  Patient is being seen today for acute visit for COVID-19. SUBJECTIVE:  Resident reports she does have an occasional cough, otherwise no concerns. Nursing staff report patient is scheduled to go back to her room on Monday    FAMILY AND SOCIAL HISTORY:  Refer to H&P. Past Medical History:   Diagnosis Date    Abnormality of gait and mobility due to Right hemiparesis secondary to Left Cerebral Infarcts. [facility] admit 06/25/18. 6/25/2018    This is an 80year old female who was brought to there ER with altered mental status. She was confused the morning of admission; she woke up and was sitting on the edge of the bed and fell to the side. In the ER she was noted to be hypertensive to 412;V systolic and bradycardic to 30's. She was noted to have a complete heart block with non ST elevation infarction. Taken to the cath lab and had    Anemia 6/25/2018    Complete heart block ([facility]) 6/26/2018    Dysphagia, oropharyngeal phase 6/26/2018    History of PTCA with stents 6/25/2018    [place] (hard of hearing) 6/25/2018    HTN (hypertension) 6/25/2018    Hypothyroidism 6/25/2018    Incontinent of urine 6/26/2018    MI (myocardial infarction) ([facility]) 6/25/2018    Osteoarthritis of multiple joints 1/15/2020    Pneumonia of both lungs due to infectious organism     Right hemiparesis ([facility]) 6/25/2018    Severe protein-calorie malnutrition ([facility]) 6/22/2018    Vascular dementia without behavioral disturbance ([facility]) 7/30/2018       MEDICATIONS AND ALLERGIES:  Reviewed on chart in [facility]. Current Outpatient Medications   Medication Sig Dispense Refill    losartan (COZAAR) 50 MG tablet Take 1 tablet by mouth 2 times daily 30 tablet 3    miconazole (MICOTIN) 2 % powder Apply topically 2 times daily.  45 g 1    hydrochlorothiazide (HYDRODIURIL) 25 MG tablet Take 0.5 tablets by mouth daily 30 tablet 3    levothyroxine (SYNTHROID) 25 MCG tablet Take 1 tablet by mouth Daily 30 tablet 3    metoprolol succinate (TOPROL XL) 25 MG extended release tablet Take 1 tablet by mouth daily 30 tablet 3    clopidogrel (PLAVIX) 75 MG tablet Take 1 tablet by mouth daily 30 tablet 3    simvastatin (ZOCOR) 20 MG tablet Take 1 tablet by mouth nightly 30 tablet 3    aspirin 81 MG chewable tablet Take 1 tablet by mouth daily 30 tablet 3     No current facility-administered medications for this visit. REVIEW OF SYSTEMS:  Resident denies any headache, dizziness, blurred vision or sore throat. She does have an occasional cough. She denies any shortness of breath, chest pain, abdominal pain, nausea, vomiting or changes in her bowel or bladder habits. She does report she is eating and drinking. Nursing staff report resident will go back to her room on Monday, otherwise no concerns. PHYSICAL EXAMINATION:  Most recent vital signs:  /57, heart rate 64, temp 97.0, respirations 17, pulse oxing 98% on room air. CONSTITUTIONAL:  Resident is alert, female elderly, in no apparent distress, cooperative with exam.  INTEGUMENT:  Pink, dry. HEENT:  Normocephalic, atraumatic in appearance. She is hard of hearing. Conjunctivae pink. Sclerae nonicteric. Mucous membranes pink, moist.  NECK:  With no visible mass. CARDIOVASCULAR:  Heart rate regular per nursing staff. RESPIRATORY:  Lung sounds clear throughout per nursing staff. No use of accessory muscles with breathing. No cough noted with exam. ABDOMEN:  Soft per nursing staff. PV:  No edema noted. ASSESSMENT AND PLAN:    1. COVID-19. Resident's respiratory status is stable. She still continues to have an occasional cough. She is pulse oxing 98% on room air and she is due to go back to her own room on Monday. Nursing staff will continue to monitor closely. No further changes will be made at this time. Return in about 1 week (around 11/14/2020).     Pursuant to the